# Patient Record
Sex: MALE | Race: WHITE | Employment: OTHER | URBAN - METROPOLITAN AREA
[De-identification: names, ages, dates, MRNs, and addresses within clinical notes are randomized per-mention and may not be internally consistent; named-entity substitution may affect disease eponyms.]

---

## 2018-01-02 ENCOUNTER — OFFICE VISIT (OUTPATIENT)
Dept: FAMILY MEDICINE CLINIC | Age: 78
End: 2018-01-02

## 2018-01-02 ENCOUNTER — HOSPITAL ENCOUNTER (OUTPATIENT)
Dept: LAB | Age: 78
Discharge: HOME | End: 2018-01-02
Payer: MEDICARE

## 2018-01-02 VITALS
RESPIRATION RATE: 18 BRPM | TEMPERATURE: 97.5 F | BODY MASS INDEX: 33.22 KG/M2 | WEIGHT: 194.6 LBS | SYSTOLIC BLOOD PRESSURE: 130 MMHG | HEIGHT: 64 IN | OXYGEN SATURATION: 97 % | HEART RATE: 58 BPM | DIASTOLIC BLOOD PRESSURE: 70 MMHG

## 2018-01-02 DIAGNOSIS — R35.0 FREQUENCY OF MICTURITION: ICD-10-CM

## 2018-01-02 DIAGNOSIS — M79.671 CHRONIC FOOT PAIN, RIGHT: ICD-10-CM

## 2018-01-02 DIAGNOSIS — E78.2 MIXED HYPERLIPIDEMIA: ICD-10-CM

## 2018-01-02 DIAGNOSIS — G89.29 CHRONIC FOOT PAIN, RIGHT: ICD-10-CM

## 2018-01-02 DIAGNOSIS — R39.198 URINARY STREAM SLOWING: ICD-10-CM

## 2018-01-02 DIAGNOSIS — I10 ESSENTIAL HYPERTENSION: ICD-10-CM

## 2018-01-02 DIAGNOSIS — E11.9 CONTROLLED TYPE 2 DIABETES MELLITUS WITHOUT COMPLICATION, WITHOUT LONG-TERM CURRENT USE OF INSULIN (HCC): ICD-10-CM

## 2018-01-02 DIAGNOSIS — M79.89 RIGHT LEG SWELLING: Primary | ICD-10-CM

## 2018-01-02 PROCEDURE — 82043 UR ALBUMIN QUANTITATIVE: CPT

## 2018-01-02 PROCEDURE — 80061 LIPID PANEL: CPT

## 2018-01-02 PROCEDURE — 85025 COMPLETE CBC W/AUTO DIFF WBC: CPT

## 2018-01-02 PROCEDURE — 84443 ASSAY THYROID STIM HORMONE: CPT

## 2018-01-02 PROCEDURE — 84153 ASSAY OF PSA TOTAL: CPT

## 2018-01-02 PROCEDURE — 80053 COMPREHEN METABOLIC PANEL: CPT

## 2018-01-02 PROCEDURE — 83036 HEMOGLOBIN GLYCOSYLATED A1C: CPT

## 2018-01-02 PROCEDURE — 36415 COLL VENOUS BLD VENIPUNCTURE: CPT

## 2018-01-02 RX ORDER — METOPROLOL SUCCINATE 25 MG/1
TABLET, EXTENDED RELEASE ORAL DAILY
COMMUNITY
End: 2018-04-10 | Stop reason: SDUPTHER

## 2018-01-02 NOTE — PROGRESS NOTES
Chief Complaint   Patient presents with    New Patient     past MD Dr. Josee Manley, doesn't take insurance any longer    Leg Pain     right leg pain x 1 month , went tto ER snd was xrayed and given Prednisone       Reviewed Record in preparation for visit and have obtained necessary documentation. Identified pt with two pt identifiers (Name @ )    There are no preventive care reminders to display for this patient. 1. Have you been to the ER, urgent care clinic since your last visit? Hospitalized since your last visit? Went to [facility] for right leg pain. 2. Have you seen or consulted any other health care providers outside of the [address] [place] [address] since your last visit? Include any pap smears or colon screening.  No

## 2018-01-02 NOTE — PATIENT INSTRUCTIONS
Please sign a release for the [facility] and for your previous primary doctor on your way out         ??? ????? ????? ? ????????????? ?? ??????? ??? ???????? ???????. - [ Learning About Diabetes Food Guidelines ]  ?????????? ?? ??????? ?????  ???????????? ??????? ???????? ?????? ???????? ? ??????? ????????? ???????. ??? ???????? ???????????? ??????? ??????? ??????? ? ????? (???????, ??????? ?? ?????????? ????????? ? ??????). ????????????? ???? ?????? ????. ?? ?????? ???? ??, ??? ??? ???? ?????, ?????? ???? ???????. ?? ??????? ???????? ???????? ?? ??, ??? ????? ?? ????? ? ??????? ???????????? ???? ?? ????????.  ??? ?????? ? ???????????? ???????? ??????? ???? ? ??????? ?? ?????? ?????????? ? ????????? ??? ?????????????????? ??????????? ?? ????????? ???????. ???????? ??? ?????????? ?? ????????? ??????? ????? ????? ?????? ??? ????????, ???? ??? ?????? ? ???? ????.  ??? ?? ?????? ?????? ? ??????????? ??????????  ???????? ?????????? ?????????, ??????? ?? ???????????, ???????? ?????? ?????? ????????? ??????? ??? ???????? ???????. ???????? ?????????? ?? ?????? ?????????.  · ???????, ????? ???????? ???????? ????????. ????? ????, ??????? ?????????? ????????? ? ?????? ?????????.  ¨ ????, ????????, ???????? ? ??? ???????? ????? 15 ??????? ????????? ? ??????. ??????? ????????? 1 ????? ????? (30 ???????), 1/2 ????? ?????????????? ???????? ??? ??? 1/3 ????? ?????????????? ??????? ??? ????.  ¨ ?????? ???????? 15 ??????? ????????? ? ??????. ??????? ????????? 1 ????????? ?????? ?????, ???????? ?????? ??? ????????; ½ ??????; ½ ????? ?????????????? ??? ???????????????? ???????; ½ ????? ?????????? ????; 1 ????? ???? ??? ??????; 2 ???????? ????? ???????????.  ¨ ?????? ? ?????? ??? ?????????? ?????? ???????? 15 ??????? ????????? ? ??????. ??????? ????????? 1 ????? ?????? ??? 2/3 ????? ??????? ??? ?????????? ??????.  ¨ ???????????? ????? ???????? 15 ??????? ????????? ? ??????. ??????? ????????? ½ ????? ????????????? ???? ??? ???????? ?????????; 1 ????? ????????????? ?????; ½ ?????? ??????? ???????????; ½ ????? ?????????????? ?????; ½ ????? ?????????????? ???????? ??? ???????? ???????.  · ???????, ??????? ????????? ????? ???? ?????? ???? ? ? ?????? ??????? ????. ???????? ??? ?????????? ?? ????????? ??????? ????? ??????? ??? ??????? ???????????? ?????????? ???????????? ?????????. ??? ?????????? ????????? ?????????.  · ???? ?? ?? ??????? ? ???????????? ????? ????????? ??????? ?????????, ??????????? ???????? ????? ???????????? ???????. ??? ???????, ??????? ?????? ?????????? ???????????????? ????? ????. ?? ????? ???????? ???????????? ???????? ? ??????? ???.  ¨ ????????? ??? ?? ??????? ?? ????? ????. ???????? ?????????????? ????? ?? ???????? ???????, ???? ??? ?????? ???????? ???? -- ?? ???????? ???????, ? ???????? ??? ???????????? ????? -- ?? ?????????? ???????? ???????. ? ????? ?????????? ?? ?????? ???????? ????????? ??????? ?????? ? 1 ????? ?????? ??? ??????? ? ??????????? ?? ????, ??????? ????????? ?? ????????????? ?????????? ?? ??????? ????.  · ?????????? ???? ?????????? ?????????? ????????? ??? ?????? ?????? ????. ?? «????????????» ???? ??????? ????? ?????????, ????? ?????? ?? ?? ???? ?????.  · ????? ???????? ????? ???? ??? ?????? ?? ???????? ????????? ? ??????. ????????? ?????? ????????: ????????, ??????, ???????, ????, ????, ????, ???, ?????? ? ?????????? ?????. ?????? ?????? ???? ?????????? 90 ???????, ??? ?? ???????? ????????????? ?????? ????. ?????? ??????????? ???? (?????? 30 ??????? ????) ????? ????????? ???????: 1/4 ????? ???????, 1 ????, 1 ???????? ????? ??????????? ????? ? 1/2 ????? ????.  ??? ????? ???????? ??? ???? ? ??? ?? ????? ???????? ???????? ??????  · ????????? ?????????? ??????? ??????, ??????? ???????? ????????. ???? ?? ?????? ????????? ???????? ??????? ?????? ????, ??? ????? ????? ??????? ?????? ?????? ??????????? ???.  · ???? ?????????, ??? ?????, ??????? ?? ????????, ???????? ??????? ????? ????????? (????????, ?????????, ???????? ??? ???????????????? ??????), ????????? ???????? ??? ?????? ? ?????? ??????????? ?????????. ???????? ????? ??? ??????? ?????.  · ???? ?? ??????? ???????, ?????????? ???? ??????? ??????? ? ????? ?? ? ????? ??? ??? ????, ????? ?????? ???????????, ??????? ????? ?????? ? ???????.  · ???? ?? ????? ???? ?? ????? ?????? ?????????, ??? ???????????, ???????????? ??? ???????? ??????????. ??? ??????? ??????? ??????? ??????? ? ?????.  ??? ??? ?? ?????? ??????  · ?????????? ??????????? ?????????? ?????, ???????? ????? ?? ???? ? ???????? ?????????. ??? ???????? ?????, ??? ??? ? ????? ? ???????? ???????? ??????? ???? ???????? ????????? ???????????. ??????? ????????? ??????? ????? ???? ? ???????? ???????? ? ?????? ??????????? ???? ??? ????????????. ??????????? ????????? ??? ????????? ????? ?????? ?????????? ????? ??? ?????????? ?????????? ?????, ????????????? ?? ????? ???????.  · ?? ??????????? ?????? ????. ???? ?? ?????????? ????? ???? ? ??????? ??????? ??? ???????????? ????????????? ????????? ??? ??????? ????????? ???????, ??? ??????? ??????? ????? ?????? ??????? ?????.  · ??????????????????? ? ??????, ?????? ??? ??????????? ????????. ???????? ????? ??????? ??????? ?????? ??????? ? ?????. ????? ????, ???? ?? ?????????? ???????????? ????????????? ????????? ??? ??????? ????????? ???????, ???????? ????? ???????? ??????????????? ???????? ???????.  ??????????? ?????????? ? ???? -- ?????? ????? ?????? ??????? ? ????? ?????? ????????.??????????? ????????? ?????, ???? ??? ??? ???????? ?????. ??? ????????? ??????? ????????? ?????? ?????. ????????????? ????? ?????? ????????, ??????? ?? ??????????, ? ????? ?? ????? ?????????? ????? ????????.  ??? ????? ???????? ?????????????? ???????????  ????????? [email or web address] ????? I147 ? ?????? ?????? ?????????????? ?????????? \"??? ????? ????? ? ????????????? ?? ??????? ??? ???????? ???????. - [ Learning About Diabetes Food Guidelines ]. \"  ??????? ?????? ??: ????? 13, 2017  ?????? ????????: 11.4  © [id number] Healthwise, Incorporated. ??????????? ??????????, ?????????????? ? ???????????? ? ???????? ???????? ????? ??????. ???? ? ??? ????????? ??????? ? ?????-???? ??????????? ??? ?? ??????????, ?????????? ? ?????? ??????????, ??????? ?? ?????? ?????. ????????  Healthwise, Incorporated ?? ???? ???????? ? ?? ????? ??????? ??????????????? ?? ??????????? ????????????? ?????? ??????????.

## 2018-01-02 NOTE — MR AVS SNAPSHOT
Visit Information Date & Time Provider Department Dept. Phone Encounter #  
 1/2/2018 10:00 AM Joey Ceja, [facility] [phone] [id number] Upcoming Health Maintenance Date Due DTaP/Tdap/Td series (1 - Tdap) 2/14/1962 ZOSTER VACCINE AGE 60> 12/14/2000 GLAUCOMA SCREENING Q2Y 2/14/2006 Pneumococcal 65+ High/Highest Risk (1 of 2 - PCV13) 2/14/2006 MEDICARE YEARLY EXAM 2/14/2006 Influenza Age 5 to Adult 8/1/2017 Allergies as of 1/2/2018  Review Complete On: 1/2/2018 By: Joey Ceja MD  
 No Known Allergies Current Immunizations  Reviewed on 12/26/2017 Name Date Td 9/30/1999, 3/29/1999, 1/28/1999 Tdap 8/6/2015 Not reviewed this visit You Were Diagnosed With   
  
 Codes Comments Right leg swelling    -  Primary ICD-10-CM: M79.89 ICD-9-CM: 729.81 Chronic foot pain, right     ICD-10-CM: M79.671, G89.29 ICD-9-CM: 729.5, 338.29 Mixed hyperlipidemia     ICD-10-CM: E78.2 ICD-9-CM: 272.2 Controlled type 2 diabetes mellitus without complication, without long-term current use of insulin ([facility]     ICD-10-CM: E11.9 ICD-9-CM: 250.00 Essential hypertension     ICD-10-CM: I10 
ICD-9-CM: 401.9 Urinary stream slowing     ICD-10-CM: R39.198 ICD-9-CM: 071.50 Frequency of micturition     ICD-10-CM: R35.0 ICD-9-CM: 788.41 Vitals BP Pulse Temp Resp Height(growth percentile) Weight(growth percentile) 130/70 (!) 58 97.5 °F (36.4 °C) (Oral) 18 5' 4\" (1.626 m) 194 lb 9.6 oz (88.3 kg) SpO2 BMI Smoking Status 97% 33.4 kg/m2 Former Smoker Vitals History BMI and BSA Data Body Mass Index Body Surface Area  
 33.4 kg/m 2 2 m 2 Preferred Pharmacy Pharmacy Name Phone [facility] [address] [place] [address], [address] [place] [address] [phone] Your Updated Medication List  
  
   
 This list is accurate as of: 1/2/18 10:54 AM.  Always use your most recent med list.  
  
  
  
  
 aspirin, buffered 81 mg Tab Take  by mouth daily. Taking twice weekly  
  
 atenolol 25 mg tablet Commonly known as:  TENORMIN Take 25 mg by mouth daily. JANUVIA 100 mg tablet Generic drug:  SITagliptin Take 100 mg by mouth daily. LOVAZA 1 gram capsule Generic drug:  omega-3 acid ethyl esters Take 2 g by mouth daily (with breakfast). metFORMIN 500 mg tablet Commonly known as:  GLUCOPHAGE Take  by mouth two (2) times daily (with meals). Takes 2 tabs in am , and 1 tab q hs  
  
 metoprolol succinate 25 mg XL tablet Commonly known as:  TOPROL-XL Take  by mouth daily. pravastatin 40 mg tablet Commonly known as:  PRAVACHOL Take 40 mg by mouth daily. valsartan-hydroCHLOROthiazide 320-25 mg per tablet Commonly known as:  DIOVAN-HCT Take 1 Tab by mouth daily. We Performed the Following CBC WITH AUTOMATED DIFF [72100 CPT(R)] LIPID PANEL [02930 CPT(R)] METABOLIC PANEL, COMPREHENSIVE [97289 CPT(R)] MICROALBUMIN, UR, RAND W/ MICROALBUMIN/CREA RATIO [id number] CPT(R)] PSA W/ REFLX FREE PSA [63872 CPT(R)] REFERRAL TO PODIATRY [REF90 Custom] TSH 3RD GENERATION [33074 CPT(R)] To-Do List   
 01/02/2018 Imaging:  DUPLEX LOWER EXT VENOUS RIGHT Referral Information Referral ID Referred By Referred To  
  
 [id number] [facility], [address] [facility]   
   [address] [facility], [address] Visits Status Start Date End Date 1 New Request 1/2/18 1/2/19 If your referral has a status of pending review or denied, additional information will be sent to support the outcome of this decision. Patient Instructions Please sign a release for the [facility] and for your previous primary doctor on your way out ??? ????? ????? ? ????????????? ?? ??????? ??? ???????? ???????. - [ Learning About Diabetes Food Guidelines ] ?????????? ?? ??????? ????? 
???????????? ??????? ???????? ?????? ???????? ? ??????? ????????? ???????. ??? ???????? ???????????? ??????? ??????? ??????? ? ????? (???????, ??????? ?? ?????????? ????????? ? ??????). ????????????? ???? ?????? ????. ?? ?????? ???? ??, ??? ??? ???? ?????, ?????? ???? ???????. ?? ??????? ???????? ???????? ?? ??, ??? ????? ?? ????? ? ??????? ???????????? ???? ?? ????????. 
??? ?????? ? ???????????? ???????? ??????? ???? ? ??????? ?? ?????? ?????????? ? ????????? ??? ?????????????????? ??????????? ?? ????????? ???????. ???????? ??? ?????????? ?? ????????? ??????? ????? ????? ?????? ??? ????????, ???? ??? ?????? ? ???? ????. 
??? ?? ?????? ?????? ? ??????????? ?????????? 
???????? ?????????? ?????????, ??????? ?? ???????????, ???????? ?????? ?????? ????????? ??????? ??? ???????? ???????. ???????? ?????????? ?? ?????? ?????????. 
· ???????, ????? ???????? ???????? ????????. ????? ????, ??????? ?????????? ????????? ? ?????? ?????????. 
¨ ????, ????????, ???????? ? ??? ???????? ????? 15 ??????? ????????? ? ??????. ??????? ????????? 1 ????? ????? (30 ???????), 1/2 ????? ?????????????? ???????? ??? ??? 1/3 ????? ?????????????? ??????? ??? ????. 
¨ ?????? ???????? 15 ??????? ????????? ? ??????. ??????? ????????? 1 ????????? ?????? ?????, ???????? ?????? ??? ????????; ½ ??????; ½ ????? ?????????????? ??? ???????????????? ???????; ½ ????? ?????????? ????; 1 ????? ???? ??? ??????; 2 ???????? ????? ???????????. 
¨ ?????? ? ?????? ??? ?????????? ?????? ???????? 15 ??????? ????????? ? ??????. ??????? ????????? 1 ????? ?????? ??? 2/3 ????? ??????? ??? ?????????? ??????. 
¨ ???????????? ????? ???????? 15 ??????? ????????? ? ??????. ??????? ????????? ½ ????? ????????????? ???? ??? ???????? ?????????; 1 ????? ????????????? ?????; ½ ?????? ??????? ???????????; ½ ????? ?????????????? ?????; ½ ????? ?????????????? ???????? ??? ???????? ???????. 
· ???????, ??????? ????????? ????? ???? ?????? ???? ? ? ?????? ??????? ????. ???????? ??? ?????????? ?? ????????? ??????? ????? ??????? ??? ??????? ???????????? ?????????? ???????????? ?????????. ??? ?????????? ????????? ?????????. 
· ???? ?? ?? ??????? ? ???????????? ????? ????????? ??????? ?????????, ??????????? ???????? ????? ???????????? ???????. ??? ???????, ??????? ?????? ?????????? ???????????????? ????? ????. ?? ????? ???????? ???????????? ???????? ? ??????? ???. 
¨ ????????? ??? ?? ??????? ?? ????? ????. ???????? ?????????????? ????? ?? ???????? ???????, ???? ??? ?????? ???????? ????  ?? ???????? ???????, ? ???????? ??? ???????????? ?????  ?? ?????????? ???????? ???????. ? ????? ?????????? ?? ?????? ???????? ????????? ??????? ?????? ? 1 ????? ?????? ??? ??????? ? ??????????? ?? ????, ??????? ????????? ?? ????????????? ?????????? ?? ??????? ????. 
· ?????????? ???? ?????????? ?????????? ????????? ??? ?????? ?????? ????. ?? «????????????» ???? ??????? ????? ?????????, ????? ?????? ?? ?? ???? ?????. 
· ????? ???????? ????? ???? ??? ?????? ?? ???????? ????????? ? ??????. ????????? ?????? ????????: ????????, ??????, ???????, ????, ????, ????, ???, ?????? ? ?????????? ?????. ?????? ?????? ???? ?????????? 90 ???????, ??? ?? ???????? ????????????? ?????? ????. ?????? ??????????? ???? (?????? 30 ??????? ????) ????? ????????? ???????: 1/4 ????? ???????, 1 ????, 1 ???????? ????? ??????????? ????? ? 1/2 ????? ????. 
??? ????? ???????? ??? ???? ? ??? ?? ????? ???????? ???????? ?????? 
· ????????? ?????????? ??????? ??????, ??????? ???????? ????????. ???? ?? ?????? ????????? ???????? ??????? ?????? ????, ??? ????? ????? ??????? ?????? ?????? ??????????? ???. 
· ???? ?????????, ??? ?????, ??????? ?? ????????, ???????? ??????? ????? ????????? (????????, ?????????, ???????? ??? ???????????????? ??????), ????????? ???????? ??? ?????? ? ?????? ??????????? ?????????. ???????? ????? ??? ??????? ?????. 
· ???? ?? ??????? ???????, ?????????? ???? ??????? ??????? ? ????? ?? ? ????? ??? ??? ????, ????? ?????? ???????????, ??????? ????? ?????? ? ???????. 
· ???? ?? ????? ???? ?? ????? ?????? ?????????, ??? ???????????, ???????????? ??? ???????? ??????????. ??? ??????? ??????? ??????? ??????? ? ?????. 
??? ??? ?? ?????? ?????? 
· ?????????? ??????????? ?????????? ?????, ???????? ????? ?? ???? ? ???????? ?????????. ??? ???????? ?????, ??? ??? ? ????? ? ???????? ???????? ??????? ???? ???????? ????????? ???????????. ??????? ????????? ??????? ????? ???? ? ???????? ???????? ? ?????? ??????????? ???? ??? ????????????. ??????????? ????????? ??? ????????? ????? ?????? ?????????? ????? ??? ?????????? ?????????? ?????, ????????????? ?? ????? ???????. 
· ?? ??????????? ?????? ????. ???? ?? ?????????? ????? ???? ? ??????? ??????? ??? ???????????? ????????????? ????????? ??? ??????? ????????? ???????, ??? ??????? ??????? ????? ?????? ??????? ?????. 
· ??????????????????? ? ??????, ?????? ??? ??????????? ????????. ???????? ????? ??????? ??????? ?????? ??????? ? ?????. ????? ????, ???? ?? ?????????? ???????????? ????????????? ????????? ??? ??????? ????????? ???????, ???????? ????? ???????? ??????????????? ???????? ???????. 
??????????? ?????????? ? ????  ?????? ????? ?????? ??????? ? ????? ?????? ????????.??????????? ????????? ?????, ???? ??? ??? ???????? ?????. ??? ????????? ??????? ????????? ?????? ?????. ????????????? ????? ?????? ????????, ??????? ?? ??????????, ? ????? ?? ????? ?????????? ????? ????????. 
??? ????? ???????? ?????????????? ??????????? 
????????? [email or web address] ????? I147 ? ?????? ?????? ?????????????? ?????????? \"??? ????? ????? ? ????????????? ?? ??????? ??? ???????? ???????. - [ Learning About Diabetes Food Guidelines ]. \" 
??????? ?????? ??: ????? 13, 2017 
?????? ????????: 11.4 © 6537-2658 Healthwise, Incorporated. ??????????? ??????????, ?????????????? ? ???????????? ? ???????? ???????? ????? ??????. ???? ? ??? ????????? ??????? ? ?????-???? ??????????? ??? ?? ??????????, ?????????? ? ?????? ??????????, ??????? ?? ?????? ?????. ???????? Healthwise, Incorporated ?? ???? ???????? ? ?? ????? ??????? ??????????????? ?? ??????????? ????????????? ?????? ??????????. Introducing [facility]! Johnnie Carlos Eduardo introduces Vestiage patient portal. Now you can access parts of your medical record, email your doctor's office, and request medication refills online. 1. In your internet browser, go to https://[employer]. [employer] 2. Click on the First Time User? Click Here link in the Sign In box. You will see the New Member Sign Up page. 3. Enter your Vestiage Access Code exactly as it appears below. You will not need to use this code after youve completed the sign-up process. If you do not sign up before the expiration date, you must request a new code. · Vestiage Access Code: [email or web address] Expires: 4/2/2018 10:54 AM 
 
4. Enter the last four digits of your Social Security Number (xxxx) and Date of Birth (mm/dd/yyyy) as indicated and click Submit. You will be taken to the next sign-up page. 5. Create a Vestiage ID. This will be your Vestiage login ID and cannot be changed, so think of one that is secure and easy to remember. 6. Create a Vestiage password. You can change your password at any time. 7. Enter your Password Reset Question and Answer. This can be used at a later time if you forget your password. 8. Enter your e-mail address. You will receive e-mail notification when new information is available in [address]. 9. Click Sign Up. You can now view and download portions of your medical record. 10. Click the Download Summary menu link to download a portable copy of your medical information.  
 
If you have questions, please visit the Frequently Asked Questions section of the [employer]. Remember, LogFiret is NOT to be used for urgent needs. For medical emergencies, dial 911. Now available from your iPhone and Android! Please provide this summary of care documentation to your next provider. Your primary care clinician is listed as Helga Mir. If you have any questions after today's visit, please call [phone].

## 2018-01-02 NOTE — PROGRESS NOTES
Abdelrahman Briggs [address] [place], [address]  [phone]             Date of visit: 1/2/2018   Subjective:      History obtained from:  Patient, wife. Mini Bradford is a 69 y.o. male who presents today for new patient  Pain started in his right first few toes  Was painful, not red or swollen,   now is swollen up the leg and hurts up the leg. Swelling the same  Using tylenol helps a little. He has had operation in foot for broken bone years ago    Was at [facility] ER recently and had xray foot  Has had swelling in the past on and off since foot surgery for fracture years ago (Dr. Floyd Szymanski) but never this much or persistent swelling    Last a1c was in Aug was 7.0  Remembers his meds  Is careful about what he eats  Has had diabetes for 9 years  Sees Dr. Shakira Danielson, has had bilateral cataract and strabismus surgery  Retinal specialist has appt 4/2 Dr. Stephanie Hinton  Has appt     Checks bp at home usually 140 range    Fasting for labs  Hx hypertrigl and high cholesterol    [place] 3[place], also walks dog every day  They try to eat well    Patient Active Problem List    Diagnosis Date Noted    Chronic foot pain, right 01/02/2018    Essential hypertension 01/02/2018    Controlled type 2 diabetes mellitus without complication, without long-term current use of insulin ([facility] [address] 01/02/2018    Mixed hyperlipidemia 01/02/2018     Current Outpatient Prescriptions   Medication Sig Dispense Refill    SITagliptin (JANUVIA) 100 mg tablet Take 100 mg by mouth daily.  metoprolol succinate (TOPROL-XL) 25 mg XL tablet Take  by mouth daily.  omega-3 acid ethyl esters (LOVAZA) 1 gram capsule Take 2 g by mouth daily (with breakfast).  pravastatin (PRAVACHOL) 40 mg tablet Take 40 mg by mouth daily.  metFORMIN (GLUCOPHAGE) 500 mg tablet Take  by mouth two (2) times daily (with meals). Takes 2 tabs in am , and 1 tab q hs      Aspirin, Buffered 81 mg tab Take  by mouth daily.  Taking twice weekly      valsartan-hydrochlorothiazide (DIOVAN-HCT) 320-25 mg per tablet Take 1 Tab by mouth daily.  atenolol (TENORMIN) 25 mg tablet Take 25 mg by mouth daily. No Known Allergies  Past Medical History:   Diagnosis Date    Diabetes ([address]     type 2; niddm    Hypertension     Ill-defined condition     HIGH CHOLESTEROL     Past Surgical History:   Procedure Laterality Date    HX APPENDECTOMY      HX CATARACT REMOVAL Right     HX GI  2013    COLONOSCOPY    HX HEENT Bilateral     STRABISMUS CORRECTION     Family History   Problem Relation Age of Onset    Diabetes Mother     Hypertension Mother    Murray Gardiner Other Father       age 55, [de-identified] stroke    Diabetes Sister     Hypertension Sister     Anesth Problems Neg Hx      Social History   Substance Use Topics    Smoking status: Former Smoker     Packs/day: 0.75     Years: 25.00     Quit date: 2004    Smokeless tobacco: Never Used    Alcohol use No      Social History     Social History Narrative        2 kids and 4 grandkids live closeby    Originally from [facility]        Review of Systems  Card: denies chest pain  Pulm: denies shortness of breath  : admits to some difficulty urinating (2x at night, small stream)  GI: denies abd pain or bleeding  Neuro denies heaches  Ent: denies congestion  All: denies allergies  Endo denies weight changes  Gen: denies fever  Psych: denies depression or anxiety  Skin: denies lesions         Objective:     Vitals:    18 1012 18 1054   BP: 156/87 130/70   Pulse: (!) 58    Resp: 18    Temp: 97.5 °F (36.4 °C)    TempSrc: Oral    SpO2: 97%    Weight: 194 lb 9.6 oz (88.3 kg)    Height: 5' 4\" (1.626 m)      Body mass index is 33.4 kg/(m^2).      General: stated age, obese and in NAD  Neck: supple, symmetrical, trachea midline, no adenopathy and thyroid: not enlarged, symmetric, no tenderness/mass/nodules  Lungs:  clear to auscultation w/o rales, rhonchi, wheezes w/normal effort and no use of accessory muscles of respiration   Heart: regular rate and rhythm, S1, S2 normal, no murmur, click, rub or gallop  Abdomen: soft, nontender, no masses  Ext:  Trace edema on left ankle, right leg with 2+ edema up to knee, slight redness, no warmth. Tenderness of great toe but no swelling, has some overlap of toes. Neg homans. No calf tenderness  Lymph: no cervical adenopathy appreciated  Skin:  Normal. and no rash or abnormalities   Psych: alert and oriented to person, place, time and situation and Speech: appropriate quality, quantity and organization of sentences, speaks in [facility] and [place]     Assessment/Plan:       ICD-10-CM ICD-9-CM    1. Right leg swelling M79.89 729.81 DUPLEX LOWER EXT VENOUS RIGHT   2. Chronic foot pain, right M79.671 729.5 REFERRAL TO PODIATRY    G89.29 338.29    3. Mixed hyperlipidemia E78.2 272.2    4. Controlled type 2 diabetes mellitus without complication, without long-term current use of insulin (HCC) E11.9 250.00 LIPID PANEL      METABOLIC PANEL, COMPREHENSIVE      CBC WITH AUTOMATED DIFF      TSH 3RD GENERATION      MICROALBUMIN, UR, RAND W/ MICROALBUMIN/CREA RATIO   5. Essential hypertension I10 401.9    6. Urinary stream slowing R39.198 788.62 PSA W/ REFLX FREE PSA   7. Frequency of micturition  R35.0 788.41 PSA W/ REFLX FREE PSA        Orders Placed This Encounter    DUPLEX LOWER EXT VENOUS RIGHT    LIPID PANEL    METABOLIC PANEL, COMPREHENSIVE    CBC WITH AUTOMATED DIFF    TSH 3RD GENERATION    MICROALBUMIN, UR, RAND W/ MICROALBUMIN/CREA RATIO    PSA W/ REFLX FREE PSA    REFERRAL TO PODIATRY    SITagliptin (JANUVIA) 100 mg tablet    metoprolol succinate (TOPROL-XL) 25 mg XL tablet       Swelling dramatic enough I think we need to rule out DVT  Perhaps this is all related to previous foot injury, however  Refer back to podiatrist for help with his chronic foot pain    DM/HTN/Lipids well controlled  Do labs, no med changes for now.   Need old records    Mild prostate symptoms chronic check PSA    Discussed the diagnosis and plan and he expressed understanding. Follow-up Disposition:  Return in about 3 months (around 4/2/2018) for Follow up. hopefully will have records by then to see what else he needs and when AWV is due.     Alyssa Cervantes MD

## 2018-01-03 ENCOUNTER — HOSPITAL ENCOUNTER (OUTPATIENT)
Dept: ULTRASOUND IMAGING | Age: 78
Discharge: HOME | End: 2018-01-03
Payer: MEDICARE

## 2018-01-03 DIAGNOSIS — M79.89 RIGHT LEG SWELLING: ICD-10-CM

## 2018-01-03 LAB
ALBUMIN SERPL-MCNC: 4.1 G/DL (ref 3.5–4.8)
ALBUMIN/CREAT UR: 31.8 MG/G CREAT (ref 0–30)
ALBUMIN/GLOB SERPL: 1.2 {RATIO} (ref 1.2–2.2)
ALP SERPL-CCNC: 60 IU/L (ref 39–117)
ALT SERPL-CCNC: 13 IU/L (ref 0–44)
AST SERPL-CCNC: 15 IU/L (ref 0–40)
BASOPHILS # BLD AUTO: 0 X10E3/UL (ref 0–0.2)
BASOPHILS NFR BLD AUTO: 0 %
BILIRUB SERPL-MCNC: 0.3 MG/DL (ref 0–1.2)
BUN SERPL-MCNC: 33 MG/DL (ref 8–27)
BUN/CREAT SERPL: 24 (ref 10–24)
CALCIUM SERPL-MCNC: 10.1 MG/DL (ref 8.6–10.2)
CHLORIDE SERPL-SCNC: 98 MMOL/L (ref 96–106)
CHOLEST SERPL-MCNC: 200 MG/DL (ref 100–199)
CO2 SERPL-SCNC: 26 MMOL/L (ref 18–29)
CREAT SERPL-MCNC: 1.39 MG/DL (ref 0.76–1.27)
CREAT UR-MCNC: 98.6 MG/DL
EOSINOPHIL # BLD AUTO: 0.1 X10E3/UL (ref 0–0.4)
EOSINOPHIL NFR BLD AUTO: 1 %
ERYTHROCYTE [DISTWIDTH] IN BLOOD BY AUTOMATED COUNT: 14.9 % (ref 12.3–15.4)
GLOBULIN SER CALC-MCNC: 3.3 G/DL (ref 1.5–4.5)
GLUCOSE SERPL-MCNC: 123 MG/DL (ref 65–99)
HCT VFR BLD AUTO: 39.7 % (ref 37.5–51)
HDLC SERPL-MCNC: 59 MG/DL
HGB BLD-MCNC: 13.5 G/DL (ref 13–17.7)
IMM GRANULOCYTES # BLD: 0 X10E3/UL (ref 0–0.1)
IMM GRANULOCYTES NFR BLD: 0 %
INTERPRETATION, 910389: NORMAL
INTERPRETATION: NORMAL
LDLC SERPL CALC-MCNC: 112 MG/DL (ref 0–99)
LYMPHOCYTES # BLD AUTO: 1.5 X10E3/UL (ref 0.7–3.1)
LYMPHOCYTES NFR BLD AUTO: 19 %
MCH RBC QN AUTO: 27.3 PG (ref 26.6–33)
MCHC RBC AUTO-ENTMCNC: 34 G/DL (ref 31.5–35.7)
MCV RBC AUTO: 80 FL (ref 79–97)
MICROALBUMIN UR-MCNC: 31.4 UG/ML
MONOCYTES # BLD AUTO: 0.7 X10E3/UL (ref 0.1–0.9)
MONOCYTES NFR BLD AUTO: 8 %
NEUTROPHILS # BLD AUTO: 6 X10E3/UL (ref 1.4–7)
NEUTROPHILS NFR BLD AUTO: 72 %
PDF IMAGE, 910387: NORMAL
PLATELET # BLD AUTO: 233 X10E3/UL (ref 150–379)
POTASSIUM SERPL-SCNC: 4.3 MMOL/L (ref 3.5–5.2)
PROT SERPL-MCNC: 7.4 G/DL (ref 6–8.5)
PSA SERPL-MCNC: 3.7 NG/ML (ref 0–4)
RBC # BLD AUTO: 4.95 X10E6/UL (ref 4.14–5.8)
REFLEX CRITERIA: NORMAL
SODIUM SERPL-SCNC: 140 MMOL/L (ref 134–144)
TRIGL SERPL-MCNC: 146 MG/DL (ref 0–149)
TSH SERPL DL<=0.005 MIU/L-ACNC: 1.39 UIU/ML (ref 0.45–4.5)
VLDLC SERPL CALC-MCNC: 29 MG/DL (ref 5–40)
WBC # BLD AUTO: 8.3 X10E3/UL (ref 3.4–10.8)

## 2018-01-03 PROCEDURE — 93971 EXTREMITY STUDY: CPT

## 2018-01-03 NOTE — PROGRESS NOTES
Please let him know there was no blood clot, which is great news. I will send a letter with labs when they come back. Thanks!

## 2018-01-04 ENCOUNTER — TELEPHONE (OUTPATIENT)
Dept: FAMILY MEDICINE CLINIC | Age: 78
End: 2018-01-04

## 2018-01-04 PROBLEM — R80.9 MICROALBUMINURIA: Status: ACTIVE | Noted: 2018-01-04

## 2018-01-04 PROBLEM — E11.21 TYPE 2 DIABETES MELLITUS WITH NEPHROPATHY (HCC): Status: ACTIVE | Noted: 2018-01-04

## 2018-01-04 NOTE — TELEPHONE ENCOUNTER
----- Message from [employer] sent at 1/4/2018 10:29 AM EST -----  Regarding: Dr. Mitul Edgar  Pt is returning call received this morning. Best contact number is [phone].

## 2018-01-05 ENCOUNTER — TELEPHONE (OUTPATIENT)
Dept: FAMILY MEDICINE CLINIC | Age: 78
End: 2018-01-05

## 2018-01-05 LAB — SPECIMEN STATUS REPORT, ROLRST: NORMAL

## 2018-01-05 NOTE — TELEPHONE ENCOUNTER
Patients son has called yesterday because he wanted to speak to nurse regarding his father and has not received any calls yet and he is very upset. He would like a call back from nurse.      Best contact number for son Gus: [phone]  1/5/18  Rangel Collado

## 2018-01-05 NOTE — TELEPHONE ENCOUNTER
Spoke with patients son and advised doppler study was negative for DVT  Regarding the bilateral foot pain, advised that Dr Ceci Fraser referred patient to [facility] Dr Betty Esqueda  Just need to call and schedule an appointment with him.

## 2018-01-06 PROBLEM — R79.89 ELEVATED SERUM CREATININE: Status: ACTIVE | Noted: 2018-01-06

## 2018-01-06 LAB
HBA1C MFR BLD: 6.5 % (ref 4.8–5.6)
SPECIMEN STATUS REPORT, ROLRST: NORMAL

## 2018-01-07 NOTE — PROGRESS NOTES
Sent in letter:  RECOMMENDATIONS:  Most tests were very good, including a1c sugar test (diabetes is well-controlled), liver, electrolytes, thyroid, blood counts, and PSA prostate test.  Cholesterol is not perfect and would probably benefit from a stronger medication (such as atorvastatin). We can discuss at your next appointment and after I see your records. I suspect you have been on this in the past and had side effects? Kidney test (creatinine) is slightly elevated. It is important that you don't take any ibuprofen or aleve, which could hurt your kidneys. We should recheck the test in 3 months.   Overall, though this is good news!!

## 2018-03-02 ENCOUNTER — TELEPHONE (OUTPATIENT)
Dept: FAMILY MEDICINE CLINIC | Age: 78
End: 2018-03-02

## 2018-03-02 NOTE — TELEPHONE ENCOUNTER
Patients son Aga Aguilar) called in ref to his father He had to take pt to ER on Monday, they did an EKG and checked hemoglobin 842. He is asking for a referral for a colonoscopy   And will have pre op blood work done here.   Best # [phone]  [place]  3/2/18

## 2018-03-29 RX ORDER — OMEGA-3-ACID ETHYL ESTERS 1 G/1
CAPSULE, LIQUID FILLED ORAL
Qty: 180 CAP | Refills: 0 | Status: SHIPPED | OUTPATIENT
Start: 2018-03-29 | End: 2018-06-18 | Stop reason: SDUPTHER

## 2018-04-10 ENCOUNTER — HOSPITAL ENCOUNTER (OUTPATIENT)
Dept: LAB | Age: 78
Discharge: HOME | End: 2018-04-10
Payer: MEDICARE

## 2018-04-10 ENCOUNTER — OFFICE VISIT (OUTPATIENT)
Dept: FAMILY MEDICINE CLINIC | Age: 78
End: 2018-04-10

## 2018-04-10 VITALS
WEIGHT: 194 LBS | DIASTOLIC BLOOD PRESSURE: 80 MMHG | RESPIRATION RATE: 16 BRPM | OXYGEN SATURATION: 99 % | HEIGHT: 64 IN | BODY MASS INDEX: 33.12 KG/M2 | TEMPERATURE: 96.3 F | HEART RATE: 57 BPM | SYSTOLIC BLOOD PRESSURE: 138 MMHG

## 2018-04-10 DIAGNOSIS — R79.89 ELEVATED SERUM CREATININE: ICD-10-CM

## 2018-04-10 DIAGNOSIS — M25.571 CHRONIC ANKLE PAIN, BILATERAL: ICD-10-CM

## 2018-04-10 DIAGNOSIS — M79.672 FOOT PAIN, BILATERAL: ICD-10-CM

## 2018-04-10 DIAGNOSIS — G89.29 CHRONIC ANKLE PAIN, BILATERAL: ICD-10-CM

## 2018-04-10 DIAGNOSIS — E78.2 MIXED HYPERLIPIDEMIA: ICD-10-CM

## 2018-04-10 DIAGNOSIS — Z00.00 MEDICARE ANNUAL WELLNESS VISIT, SUBSEQUENT: Primary | ICD-10-CM

## 2018-04-10 DIAGNOSIS — I10 ESSENTIAL HYPERTENSION: ICD-10-CM

## 2018-04-10 DIAGNOSIS — E11.21 TYPE 2 DIABETES MELLITUS WITH NEPHROPATHY (HCC): ICD-10-CM

## 2018-04-10 DIAGNOSIS — R39.11 URINARY HESITANCY: ICD-10-CM

## 2018-04-10 DIAGNOSIS — M25.572 CHRONIC ANKLE PAIN, BILATERAL: ICD-10-CM

## 2018-04-10 DIAGNOSIS — M79.671 FOOT PAIN, BILATERAL: ICD-10-CM

## 2018-04-10 DIAGNOSIS — E11.9 CONTROLLED TYPE 2 DIABETES MELLITUS WITHOUT COMPLICATION, WITHOUT LONG-TERM CURRENT USE OF INSULIN (HCC): ICD-10-CM

## 2018-04-10 PROCEDURE — 84550 ASSAY OF BLOOD/URIC ACID: CPT

## 2018-04-10 PROCEDURE — 86038 ANTINUCLEAR ANTIBODIES: CPT

## 2018-04-10 PROCEDURE — 86431 RHEUMATOID FACTOR QUANT: CPT

## 2018-04-10 PROCEDURE — 80048 BASIC METABOLIC PNL TOTAL CA: CPT

## 2018-04-10 PROCEDURE — 86140 C-REACTIVE PROTEIN: CPT

## 2018-04-10 PROCEDURE — 85651 RBC SED RATE NONAUTOMATED: CPT

## 2018-04-10 PROCEDURE — 85025 COMPLETE CBC W/AUTO DIFF WBC: CPT

## 2018-04-10 PROCEDURE — 83036 HEMOGLOBIN GLYCOSYLATED A1C: CPT

## 2018-04-10 RX ORDER — METOPROLOL SUCCINATE 25 MG/1
25 TABLET, EXTENDED RELEASE ORAL DAILY
Qty: 90 TAB | Refills: 1 | Status: SHIPPED | OUTPATIENT
Start: 2018-04-10 | End: 2018-10-08 | Stop reason: SDUPTHER

## 2018-04-10 RX ORDER — AMLODIPINE BESYLATE 10 MG/1
10 TABLET ORAL DAILY
Qty: 90 TAB | Refills: 1 | Status: SHIPPED | OUTPATIENT
Start: 2018-04-10 | End: 2018-10-08 | Stop reason: SDUPTHER

## 2018-04-10 RX ORDER — AMLODIPINE BESYLATE 10 MG/1
10 TABLET ORAL DAILY
Refills: 4 | COMMUNITY
Start: 2018-03-28 | End: 2018-04-10 | Stop reason: SDUPTHER

## 2018-04-10 RX ORDER — ATORVASTATIN CALCIUM 40 MG/1
40 TABLET, FILM COATED ORAL DAILY
Qty: 90 TAB | Refills: 1 | Status: SHIPPED | OUTPATIENT
Start: 2018-04-10 | End: 2018-10-08 | Stop reason: SDUPTHER

## 2018-04-10 NOTE — MR AVS SNAPSHOT
[address] [place] [address] 
[phone] Patient: [facility] DORA MRN: [id number] FXB:[birth year] Visit Information Date & Time Provider Department Dept. Phone Encounter #  
 4/10/2018  8:30 AM Frida Hennessy, [address] [phone] [id number] Follow-up Instructions Return in about 3 months (around 7/10/2018) for Follow up. Upcoming Health Maintenance Date Due  
 FOOT EXAM Q1 2/14/1950 EYE EXAM RETINAL OR DILATED Q1 2/14/1950 ZOSTER VACCINE AGE 60> 12/14/2000 GLAUCOMA SCREENING Q2Y 2/14/2006 Pneumococcal 65+ High/Highest Risk (1 of 2 - PCV13) 2/14/2006 MEDICARE YEARLY EXAM 3/14/2018 HEMOGLOBIN A1C Q6M 7/2/2018 MICROALBUMIN Q1 1/2/2019 LIPID PANEL Q1 1/2/2019 DTaP/Tdap/Td series (2 - Td) 8/6/2025 Allergies as of 4/10/2018  Review Complete On: 4/10/2018 By: Frida Hennessy MD  
 No Known Allergies Current Immunizations  Reviewed on 12/26/2017 Name Date Td 9/30/1999, 3/29/1999, 1/28/1999 Tdap 8/6/2015 Not reviewed this visit You Were Diagnosed With   
  
 Codes Comments Medicare annual wellness visit, subsequent    -  Primary ICD-10-CM: Z00.00 ICD-9-CM: V70.0 Type 2 diabetes mellitus with nephropathy (HCC)     ICD-10-CM: E11.21 
ICD-9-CM: 250.40, 583.81 Essential hypertension     ICD-10-CM: I10 
ICD-9-CM: 401.9 Mixed hyperlipidemia     ICD-10-CM: E78.2 ICD-9-CM: 272.2 Elevated serum creatinine     ICD-10-CM: R79.89 ICD-9-CM: 790.99 Foot pain, bilateral     ICD-10-CM: M79.671, K52.997 ICD-9-CM: 729.5 Chronic ankle pain, bilateral     ICD-10-CM: M25.571, G89.29, M25.572 ICD-9-CM: 719.47, 338.29 Controlled type 2 diabetes mellitus without complication, without long-term current use of insulin ([facility]     ICD-10-CM: E11.9 ICD-9-CM: 250.00 Urinary hesitancy     ICD-10-CM: R39.11 ICD-9-CM: 788.64   
  
 Vitals BP Pulse Temp Resp Height(growth percentile) Weight(growth percentile) 138/80 (!) 57 96.3 °F (35.7 °C) (Oral) 16 5' 4\" (1.626 m) 194 lb (88 kg) SpO2 BMI Smoking Status 99% 33.3 kg/m2 Former Smoker Vitals History BMI and BSA Data Body Mass Index Body Surface Area  
 33.3 kg/m 2 1.99 m 2 Preferred Pharmacy Pharmacy Name Phone [facility] [address] [place] [address], [address] [place] [address] [phone] Your Updated Medication List  
  
   
This list is accurate as of 4/10/18  9:28 AM.  Always use your most recent med list. amLODIPine 10 mg tablet Commonly known as:  Jacquetta Couch Take 1 Tab by mouth daily. aspirin, buffered 81 mg Tab Take  by mouth daily. Taking twice weekly  
  
 atorvastatin 40 mg tablet Commonly known as:  LIPITOR Take 1 Tab by mouth daily. Replaces pravastatin  
  
 metoprolol succinate 25 mg XL tablet Commonly known as:  TOPROL-XL Take 1 Tab by mouth daily. omega-3 acid ethyl esters 1 gram capsule Commonly known as:  Zebedee Smoker TAKE 2 CAPSULES BY MOUTH EVERY DAY SITagliptin 100 mg tablet Commonly known as:  Rah Guilderland Take 1 Tab by mouth daily. Prescriptions Sent to Pharmacy Refills  
 atorvastatin (LIPITOR) 40 mg tablet 1 Sig: Take 1 Tab by mouth daily. Replaces pravastatin Class: Normal  
 Pharmacy: [address], [address] Ph #: [phone] Route: Oral  
 amLODIPine (NORVASC) 10 mg tablet 1 Sig: Take 1 Tab by mouth daily. Class: Normal  
 Pharmacy: [address] Ph #: [phone] Route: Oral  
 SITagliptin (JANUVIA) 100 mg tablet 1 Sig: Take 1 Tab by mouth daily.   
 Class: Normal  
 Pharmacy: [facility] [address] [place] [address] of [address] Ph #: [phone] Route: Oral  
 metoprolol succinate (TOPROL-XL) 25 mg XL tablet 1 Sig: Take 1 Tab by mouth daily. Class: Normal  
 Pharmacy: [facility] [address] [place] [address] Drive, [address] of [address] Ph #: [phone] Route: Oral  
  
We Performed the Following CHINO W/REFLEX [22543 CPT(R)] C REACTIVE PROTEIN, QT [48488 CPT(R)] CBC WITH AUTOMATED DIFF [32113 CPT(R)] HEMOGLOBIN A1C WITH EAG [54338 CPT(R)] HM DIABETES FOOT EXAM [HM7 Custom] METABOLIC PANEL, BASIC [36799 CPT(R)] RHEUMATOID FACTOR, QL [id number] CPT(R)] SED RATE (ESR) [id number] CPT(R)] URIC ACID [id number] CPT(R)] Follow-up Instructions Return in about 3 months (around 7/10/2018) for Follow up. To-Do List   
 04/10/2018 Imaging:  XR FOOT LT MIN 3 V Patient Instructions (Preventive care checklist to be included in patient instructions) Discussed today Done Previously Not Needed X declines   Pneumococcal vaccines X declines   Flu vaccine X declines   Hepatitis B vaccine (if at risk) X declines   Shingles vaccine X declines   TDAP vaccine  
  x Digital rectal exam  
 x  PSA X 5 years ago, bleeding  Colorectal cancer screening  
  x Low-dose CT for lung cancer screening  
  x Bone density test  
 x  Glaucoma screening  
 x  Cholesterol test  
 x  Diabetes screening test   
 x  Diabetes self-management class  
  x Nutritionist referral for diabetes or renal disease  
 
 
  
????????? ???????? ????? 66 ???: ??????????? ?????????? - [ Well Visit, Over 72: Care Instructions ] ?????????? ?? ??????? ????? 
??????? ? ????? ????? ?????? ?????????? ????????. ???? ???????? ????? ????????? ?????? ???????? ? ????? ???? ??????????? ???????????? ?? ?????? ?????. ????? ????, ???? ????? ????????? ???????. ??? ???????????? ??????????? ????????? ?????????, ????????? ??????????? ??????????? ???????????? ? ?????????? ???? ????. 
??????????? ?????????? ? ????  ?????? ????? ?????? ??????? ? ????? ?????? ????????.??????????? ????????? ?????, ???? ??? ??? ???????? ?????. ??? ????????? ??????? ????????? ?????? ?????. ????????????? ????? ?????? ????????, ??????? ?? ??????????, ? ????? ?? ????? ?????????? ????? ????????. 
??? ???????? ? ???????? ????????? 
· ???????????? ???? ??? ? ????????????? ???. ??? ?????? ???? ?????? ???????????, ???????? ????????, ????????? ???????, ??????????? ?????? ? ????????? ????????????? ????????. 
· ?????????? ?????????? ??????????? ???????????? ?? ????? ??? ?? 30 ????? ? ????, ?????????? ?????????. ??? ????? ????? ??????? ??????. ???????? ? ?????? ???? ?????????? ??????????, ???????? ???, ????????, ???? ?? ??????????, ?????? ??? ????????? ???? ??????. 
· ?? ??????. ??????? ????? ??????? ???????? ?? ?????????. ???? ?? ????????? ??????? ?????? ? ??? ????????? ??????, ???????? ?????? ????? ? ?????????? ? ??????????, ??????? ??????? ?????????? ?? ???? ????????. ??? ???????? ????? ???????? ???? ????? ???????? ??????? ??????. 
· ?????? ???????? ?????????????? ???? ?? ??????? ????, ???????? ??? ?????????? ?????. ??????????? ??????????? ?????????????? ????? ???????? ??????? ? ???????????? ???????? (SPF) 30 ? ????. ??????????? ?? ?????? ????, ???? ???? ????????. 
· ???? ??? ??? ???? ? ??? ?????????? ???????? ??????????? ??? ????????????????? ??????? ? ????????????? ?????? ?????. 
· ? ?????????? ??????????????? ?????? ????????????. 
· ?????????? ???????????? ????????: ?? ????? 2 ????? ???????? ? ???? ??? ?????? ? ?? ????? 1 ????? ??? ??????. ??????? ??????? ????? ?????????????? ???????? ????? ???????? ? ????????????? ??????? ?? ?????????. 
?????????? ???????????? ????? ?? ????? ???????? ? ??????????? ?????? ????????????. ??? ???????????? ???????? ??????? ??????????? ?? ?????? ??????. 
??? ?????? ? ?????? 
 · ??????????. ??? ???? ????????? ???, ??? ????? ??? ?????? ? ??????????? ?? ?????? ????????? ???????? ? ?????? ????????, ??????? ????? ???????? ???? ???????? ? ????????. 
· ???????????? ????????. ????????? ???? ???????????? ???????? ?? ????? ????????????????? ???????. ??? ???? ????????? ???, ??? ????? ??????? ????????? ???????????? ???????? ? ??????????? ?? ????????, ??????????? ?????? ????????????? ???????? ? ?????? ????????. 
· ???????? ??????. ??????? ? ?????, ??????? ?? ??? ????? ??????? ??? ????????? ????????? ???????. 
· ??????. ???????? ??????????? ????????? ????????? ???????????? ??? ????????? ???????? ? ?????? ?????????? ??????????? ????. 
· ????. ???????? ?????, ???? ?? ???????? ? ???? ?????-???? ????????? ?????. ?? ?????? ?????? ????????? ??? ??????????? ????, ????????? ?????? ?? ???????. 
· ???????????? ??? ????????? ???? ???????? ?????????. ????????? ???????????? ??? ????????? ???? ???????? ????????? ? ???????????? ? ?????????????? ?????? ?????. ????? ????????? ???? ?? ?????????? ????? ????????????. 
· ???? ???????? ? ????????. ?? ???? ?????? ???? ? 46 ??? ??????? ????????? ?????? ????? ???????? ???????? ? ????????. ???? ?????????? ????? ???????, ??? ??? ???????, ???????????? ????????, ??????????, ??????? ??? ???????? ??????, ??? ?????? ????? ???????? ???????? ??? ???????? ? ??????? ????????? 10 ???. 
· ??????????. ?????????? ? ?????? ? ???, ??????? ?? ??? ????????? ???????????? ?? ??????????? ????????? ?????? ??? ????????? ?? ??????????. ????? ??????? ? ?????, ???? ?? ??? ????????? ??????? ??????? ? ???????? ? ????????? D. 
??? ?????? 
· ????? ? ????? ????? ? ???????????????? ????????????. ????????, ??? ?????? ?? ???? ??????? ????? ?? ????? ?????. ?????????? ?? ????? ??????, ??????? ?? ??? ?????????? ??? ?????????? ??????? ????? ?? ????? ?????. 
· ???????????? ???????? ????? ? ???????????. ???????, ??? ????? ??? ??????? ????????? ???????????, ??????? ???????? ?????????????????? ????????????? ???????? ?????. ??????????? ????? ??????? ??? ???????? ?????? ?? ????, ??? ??? ????? ?????????????, ????? ??? ????? ????? ????????. 
· ??????????? ?????????? ??????. ?????????? ? ?????? ? ???, ??????? ?? ??? ????????? ?????????? ?????? ? ?????? ??????????? ???????????? ???????. ? ?????? ?????????? ???? ???????? ??????????? ?????????? ??????. 
??? ?????? 
· ???????????? ?????????????? ??????. ??????? ? ???????? ?????, ??????? ?? ??? ??????? ?????? ????? ?? ?????? ???? ?????????????? ??????  ???????????????????? ??????? (???). ??????????? ?????????? ?? ??????, ????? ?? ???????? ??????????? ??????? ???? ??????. ????????? ???????? ??????????? ???????? ???????????? ? ????? ????? ??????? ?? ????? ??????. 
· ????????? ??????? ?????. ??????? ? ???????? ?????, ??????? ?? ??? ?????? ???????????? ??? ????????? ?????????. ??? ????? ????????????? ????????????, ???? ?? ?????-?????? ?????? ??? ? ?????? ????????, ?????, ?????? ??? ??????? ???? ?????????. 
????? ????? ?????????? ?? ???????? 
??????????? ?????????? ?? ?????????? ?????? ???????? ? ??????????? ??????????? ? ????? ??? ????????? ????? ??? ??????????? ??? ?????????. 
??? ????? ???????? ?????????????? ??????????? 
????????? [email or web address] ????? [id number] ? ?????? ?????? ?????????????? ?????????? \"????????? ???????? ????? 66 ???: ??????????? ?????????? - [ Well Visit, Over 72: Care Instructions ]. \" 
??????? ?????? ??: ??? 16, 2017 
?????? ????????: 11.4 © [id number] [employer]. ??????????? ??????????, ?????????????? ? ???????????? ? ???????? ???????? ????? ??????. ???? ? ??? ????????? ??????? ? ?????-???? ??????????? ??? ?? ??????????, ?????????? ? ?????? ??????????, ??????? ?? ?????? ?????. ???????? [employer] ?? ???? ???????? ? ?? ????? ??????? ??????????????? ?? ??????????? ????????????? ?????? ??????????. Introducing [facility] SERVICES! [facility] introduces [employer] patient portal. Now you can access parts of your medical record, email your doctor's office, and request medication refills online. 1. In your internet browser, go to [email or web address] 2. Click on the First Time User? Click Here link in the Sign In box. You will see the New Member Sign Up page. 3. Enter your [employer] Access Code exactly as it appears below. You will not need to use this code after youve completed the sign-up process. If you do not sign up before the expiration date, you must request a new code. · [employer] Access Code: [email or web address] Expires: 7/9/2018  9:12 AM 
 
4. Enter the last four digits of your Social Security Number (xxxx) and Date of Birth (mm/dd/yyyy) as indicated and click Submit. You will be taken to the next sign-up page. 5. Create a [employer] ID. This will be your [employer] login ID and cannot be changed, so think of one that is secure and easy to remember. 6. Create a [employer] password. You can change your password at any time. 7. Enter your Password Reset Question and Answer. This can be used at a later time if you forget your password. 8. Enter your e-mail address. You will receive e-mail notification when new information is available in [address] 9. Click Sign Up. You can now view and download portions of your medical record. 10. Click the Download Summary menu link to download a portable copy of your medical information. If you have questions, please visit the Frequently Asked Questions section of the [employer] website. Remember, [employer] is NOT to be used for urgent needs. For medical emergencies, dial 911. Now available from your iPhone and Android! Please provide this summary of care documentation to your next provider. Your primary care clinician is listed as Misha Mccormick. If you have any questions after today's visit, please call [phone].

## 2018-04-10 NOTE — PROGRESS NOTES
[facility]  [place], [address]  [phone]             Date of visit: 4/10/18       This is a Subsequent Medicare Annual Wellness Visit (AWV), (Performed more than 12 months after effective date of Medicare Part B enrollment and 12 months after last preventive visit)    I have reviewed the patient's medical history in detail and updated the computerized patient record. Karmen Queen is a 69 y.o. male   History obtained from: patient, wife.     Concerns today     -bilateral foot pain hard to walk for about a year  Hurt all day every day  Fracture in right foot first about 5 years ago around the first MCP joint  Both feet hurt, sometimes right more than left  Hurts in ankles too  No pain in hands, shoulders, elbows or hips  Knees ok (had steroid shot right knee with Dr. Silvia Hinkle not long ago that helped)  Dr. Alisha Dotson podiatry told him he needs surgery but he reallydoesn't want to do that now (on first right MCP joint)  -cardiology np stopped his diovan due to elevated creatinine, on norvasc now  -does [employer] 3x per week bike  -not taking pills for pain but using various otc creams, heat for foot pain  -hasn't checked sugar recently  His metformin was discontinued by cardiologist  -swollen foot and ankles for about 1 year, not new  -has had prostate checked in past was ok, symptoms mild, doesn't want further work up  Mainly just concerned about his feet  -had aches on zocor but hasn't tried lipitor; on pravastatin 80    History     Patient Active Problem List   Diagnosis Code    Chronic foot pain, right M79.671, G89.29    Essential hypertension I10    Controlled type 2 diabetes mellitus without complication, without long-term current use of insulin ([address] E11.9    Mixed hyperlipidemia E78.2    Type 2 diabetes mellitus with nephropathy ([address] E11.21    Microalbuminuria R80.9    Elevated serum creatinine R79.89    Chronic ankle pain, bilateral M25.571, G89.29, M25.572    Foot pain, bilateral M79.671, M79.672    Urinary hesitancy R39.11     Past Medical History:   Diagnosis Date    Diabetes ([address]     type 2; niddm    Hypertension     Ill-defined condition     HIGH CHOLESTEROL      Past Surgical History:   Procedure Laterality Date    CARDIAC SURG PROCEDURE UNLIST  2018    2 stents placed     HX APPENDECTOMY  1956    HX CATARACT REMOVAL Right     HX ENDOSCOPY      h pylori moderate chronic gastritis    HX GI  2013    COLONOSCOPY    HX HEENT Bilateral     STRABISMUS CORRECTION     No Known Allergies  Current Outpatient Prescriptions   Medication Sig Dispense Refill    atorvastatin (LIPITOR) 40 mg tablet Take 1 Tab by mouth daily. Replaces pravastatin 90 Tab 1    amLODIPine (NORVASC) 10 mg tablet Take 1 Tab by mouth daily. 90 Tab 1    SITagliptin (JANUVIA) 100 mg tablet Take 1 Tab by mouth daily. 90 Tab 1    metoprolol succinate (TOPROL-XL) 25 mg XL tablet Take 1 Tab by mouth daily. 90 Tab 1    omega-3 acid ethyl esters (LOVAZA) 1 gram capsule TAKE 2 CAPSULES BY MOUTH EVERY  Cap 0    Aspirin, Buffered 81 mg tab Take  by mouth daily.  Taking twice weekly       Family History   Problem Relation Age of Onset    Diabetes Mother     Hypertension Mother     Other Father       age 55, [de-identified] stroke    Diabetes Sister     Hypertension Sister     Anesth Problems Neg Hx      Social History   Substance Use Topics    Smoking status: Former Smoker     Packs/day: 0.75     Years: 25.00     Quit date: 2004    Smokeless tobacco: Never Used    Alcohol use No       Specialists/Care Team   [employer] has established care with the following healthcare providers:  Patient Care Team:  Pratima Blackman MD as PCP - General (Family Practice)  Chapis Cifuentes MD (Ophthalmology)  Pauline Sales MD (Ophthalmology)  Adore Yi DPM (Podiatry)  Sarah Beth Rooney MD (Orthopedic Surgery)  Shirley Balbuena MD (Cardiology)    Health Risk Assessment     Demographics   male  69 y.o. General Health Questions   -During the past 4 weeks:   -how would you rate your health in general? Good   -how often have you been bothered by feeling dizzy when standing up? never   -how much have you been bothered by bodily pain? moderately   -Have you noticed any hearing difficulties? no   -has your physical and emotional health limited your social activities with family or friends? Yes, only the foot pain    Emotional Health Questions   -Do you have a history of depression, anxiety, or emotional problems? no  -Over the past 2 weeks, have you felt down, depressed or hopeless? Yes due to foot pain  -Over the past 2 weeks, have you felt little interest or pleasure in doing things? no    Health Habits   Please describe your diet habits: eats very well,a voids salt and sugar, drinks water coffee and tea, some diet cola  Do you get 5 servings of fruits or vegetables daily? yes  Do you exercise regularly? Yes, [employer] 3x per week bike    Activities of Daily Living and Functional Status   -Do you need help with eating, walking, dressing, bathing, toileting, the phone, transportation, shopping, preparing meals, housework, laundry, medications or managing money? no  -In the past four weeks, was someone available to help you if you needed and wanted help with anything? yes  -Are you confident are you that you can control and manage most of your health problems? yes  -Have you been given information to help you keep track of your medications? yes  -How often do you have trouble taking your medications as prescribed? never    Fall Risk and Home Safety   Have you fallen 2 or more times in the past year? no  Does your home have rugs in the hallway, lack grab bars in the bathroom, lack handrails on the stairs or have poor lighting? They think home is safe  Do you have smoke detectors and check them regularly?  yes  Do you have difficulties driving a car? no  Do you always fasten your seat belt when you are in a car? yes    Review of Systems (if indicated for problems addressed today)   GI: denies hematochezia  : admits to straining at times to get urine to come out  Card: denies chest pain  Pulm: denies shortness of breath      Physical Examination     Vitals:    04/10/18 0826 04/10/18 0908   BP: 163/89 138/80   Pulse: (!) 57    Resp: 16    Temp: 96.3 °F (35.7 °C)    TempSrc: Oral    SpO2: 99%    Weight: 194 lb (88 kg)    Height: 5' 4\" (1.626 m)      Body mass index is 33.3 kg/(m^2). No exam data present  Was the patient's timed Up & Go test unsteady or longer than 30 seconds? Yes, hobbles on his feet    Evaluation of Cognitive Function   Mood/affect:  happy  Orientation: normal   Appearance: age appropriate  Family member/caregiver input: wife thinks he is doing well other than pains    Additional exam if indicated for problems addressed today:  General: stated age, obese and in NAD  Neck: supple, symmetrical, trachea midline, no adenopathy and thyroid: not enlarged, symmetric, no tenderness/mass/nodules  Lungs:  clear to auscultation w/o rales, rhonchi, wheezes w/normal effort and no use of accessory muscles of respiration   Heart: regular rate and rhythm, S1, S2 normal, no murmur, click, rub or gallop  Abdomen: soft, nontender  Ext:  1+ nonpitting bilateral ankle edema noted. MSK: both feet and ankles diffusely tender especially over MTPs, right great toe and MTP slightly red, otherwise no redness.  Whole feet and ankles are swollen  Lymph: no cervical adenopathy appreciated  Skin:  Normal. and no rash or abnormalities   Psych: alert and oriented to person, place, time and situation and Speech: appropriate quality, quantity and organization of sentences     Advice/Referrals/Counseling (as indicated)   Education and counseling provided for any problems identified above: diet, exercise    Preventive Services     Health Maintenance   Topic Date Due    EYE EXAM RETINAL OR DILATED Q1 02/14/1950    ZOSTER VACCINE AGE 60>  12/14/2000    GLAUCOMA SCREENING Q2Y  02/14/2006    Pneumococcal 65+ High/Highest Risk (1 of 2 - PCV13) 02/14/2006    HEMOGLOBIN A1C Q6M  07/02/2018    MICROALBUMIN Q1  01/02/2019    LIPID PANEL Q1  01/02/2019    FOOT EXAM Q1  04/10/2019    MEDICARE YEARLY EXAM  04/11/2019    DTaP/Tdap/Td series (2 - Td) 08/06/2025    Influenza Age 9 to Adult  Addressed     (Preventive care checklist to be included in patient instructions)  Discussed today Done Previously Not Needed    X declines   Pneumococcal vaccines   X declines   Flu vaccine   X declines   Hepatitis B vaccine (if at risk)   X declines   Shingles vaccine   X declines   TDAP vaccine     x Digital rectal exam    x  PSA    X 5 years ago, bleeding  Colorectal cancer screening     x Low-dose CT for lung cancer screening     x Bone density test    x  Glaucoma screening    x  Cholesterol test    x  Diabetes screening test     x  Diabetes self-management class     x Nutritionist referral for diabetes or renal disease     Discussion of Advance Directive   Discussed with Maurice Desai his ability to prepare and advance directive in the case that an injury or illness causes him to be unable to make health care decisions. Sent home with slinkset choices folder and information to contact nurse navigator if questions. Assessment/Plan   Z00.00    ICD-10-CM ICD-9-CM    1. Medicare annual wellness visit, subsequent Z00.00 V70.0    2. Type 2 diabetes mellitus with nephropathy (HCC) E11.21 250.40 HEMOGLOBIN A1C WITH EAG     758.51 METABOLIC PANEL, BASIC       DIABETES FOOT EXAM   3. Essential hypertension I10 401.9 CBC WITH AUTOMATED DIFF   4. Mixed hyperlipidemia E78.2 272.2    5. Elevated serum creatinine R79.89 790.99    6. Foot pain, bilateral M79.671 729.5 URIC ACID    M79.672  RHEUMATOID FACTOR, QL      CHINO W/REFLEX      SED RATE (ESR)      C REACTIVE PROTEIN, QT      XR FOOT LT MIN 3 V   7.  Chronic ankle pain, bilateral M25.571 719.47     G89.29 338.29     M25.572     8. Controlled type 2 diabetes mellitus without complication, without long-term current use of insulin (HCC) E11.9 250.00    9. Urinary hesitancy R39.11 788.64        Orders Placed This Encounter    XR FOOT LT MIN 3 V    HEMOGLOBIN A1C WITH EAG    METABOLIC PANEL, BASIC    URIC ACID    RHEUMATOID FACTOR, QL    CHINO W/REFLEX    SED RATE (ESR)    C REACTIVE PROTEIN, QT    CBC WITH AUTOMATED DIFF    HM DIABETES FOOT EXAM    DISCONTD: amLODIPine (NORVASC) 10 mg tablet    atorvastatin (LIPITOR) 40 mg tablet    amLODIPine (NORVASC) 10 mg tablet    SITagliptin (JANUVIA) 100 mg tablet    metoprolol succinate (TOPROL-XL) 25 mg XL tablet     Preventive care as above  Foot pain bothering him and really limiting him  Will do xray left foot (had xray right foot with podiatry who recommended surgery; he is not interested in that)  Will do labs, then come up with plan for pain control  Encouraged a little weight loss for his health, may also help his pain  DM generally controlled but now not on metformin, will check labs and go from there  Losartan was also stopped due to elevated creatinine, but if stable may need it to protect his kidneys  bp doing well enough on the norvasc; do wonder if that has made his feet more swollen, although they say he did have swelling/pain before that. Change statin to one that is more potent as he is diabetic  Urinary hesitancy not bothering him enough to want med or referral; psa recently ok    Follow-up Disposition:  Return in about 3 months (around 7/10/2018) for Follow up.     Renu Cobb MD

## 2018-04-10 NOTE — PROGRESS NOTES
Chief Complaint   Patient presents with    Diabetes     follow up    Cholesterol Problem     follow up     1. Have you been to the ER, urgent care clinic since your last visit? Hospitalized since your last visit? Yes 2/20/18 blood loss, low hgb    2. Have you seen or consulted any other health care providers outside of the [address] [place] [address] since your last visit? Include any pap smears or colon screening.  Yes When: 3/12/18 [facility]

## 2018-04-10 NOTE — PATIENT INSTRUCTIONS
(Preventive care checklist to be included in patient instructions)  Discussed today Done Previously Not Needed    X declines   Pneumococcal vaccines   X declines   Flu vaccine   X declines   Hepatitis B vaccine (if at risk)   X declines   Shingles vaccine   X declines   TDAP vaccine     x Digital rectal exam    x  PSA    X 5 years ago, bleeding  Colorectal cancer screening     x Low-dose CT for lung cancer screening     x Bone density test    x  Glaucoma screening    x  Cholesterol test    x  Diabetes screening test     x  Diabetes self-management class     x Nutritionist referral for diabetes or renal disease          ????????? ???????? ????? 66 ???: ??????????? ?????????? - [ Well Visit, Over 72: Care Instructions ]  ?????????? ?? ??????? ?????  ??????? ? ????? ????? ?????? ?????????? ????????. ???? ???????? ????? ????????? ?????? ???????? ? ????? ???? ??????????? ???????????? ?? ?????? ?????. ????? ????, ???? ????? ????????? ???????. ??? ???????????? ??????????? ????????? ?????????, ????????? ??????????? ??????????? ???????????? ? ?????????? ???? ????.  ??????????? ?????????? ? ???? -- ?????? ????? ?????? ??????? ? ????? ?????? ????????.??????????? ????????? ?????, ???? ??? ??? ???????? ?????. ??? ????????? ??????? ????????? ?????? ?????. ????????????? ????? ?????? ????????, ??????? ?? ??????????, ? ????? ?? ????? ?????????? ????? ????????.  ??? ???????? ? ???????? ?????????  · ???????????? ???? ??? ? ????????????? ???. ??? ?????? ???? ?????? ???????????, ???????? ????????, ????????? ???????, ??????????? ?????? ? ????????? ????????????? ????????.  · ?????????? ?????????? ??????????? ???????????? ?? ????? ??? ?? 30 ????? ? ????, ?????????? ?????????. ??? ????? ????? ??????? ??????. ???????? ? ?????? ???? ?????????? ??????????, ???????? ???, ????????, ???? ?? ??????????, ?????? ??? ????????? ???? ??????.  · ?? ??????. ??????? ????? ??????? ???????? ?? ?????????. ???? ?? ????????? ??????? ?????? ? ??? ????????? ??????, ???????? ?????? ????? ? ?????????? ? ??????????, ??????? ??????? ?????????? ?? ???? ????????. ??? ???????? ????? ???????? ???? ????? ???????? ??????? ??????.  · ?????? ???????? ?????????????? ???? ?? ??????? ????, ???????? ??? ?????????? ?????. ??????????? ??????????? ?????????????? ????? ???????? ??????? ? ???????????? ???????? (SPF) 30 ? ????. ??????????? ?? ?????? ????, ???? ???? ????????.  · ???? ??? ??? ???? ? ??? ?????????? ???????? ??????????? ??? ????????????????? ??????? ? ????????????? ?????? ?????.  · ? ?????????? ??????????????? ?????? ????????????.  · ?????????? ???????????? ????????: ?? ????? 2 ????? ???????? ? ???? ??? ?????? ? ?? ????? 1 ????? ??? ??????. ??????? ??????? ????? ?????????????? ???????? ????? ???????? ? ????????????? ??????? ?? ?????????.  ?????????? ???????????? ????? ?? ????? ???????? ? ??????????? ?????? ????????????. ??? ???????????? ???????? ??????? ??????????? ?? ?????? ??????.  ??? ?????? ? ??????  · ??????????. ??? ???? ????????? ???, ??? ????? ??? ?????? ? ??????????? ?? ?????? ????????? ???????? ? ?????? ????????, ??????? ????? ???????? ???? ???????? ? ????????.  · ???????????? ????????. ????????? ???? ???????????? ???????? ?? ????? ????????????????? ???????. ??? ???? ????????? ???, ??? ????? ??????? ????????? ???????????? ???????? ? ??????????? ?? ????????, ??????????? ?????? ????????????? ???????? ? ?????? ????????.  · ???????? ??????. ??????? ? ?????, ??????? ?? ??? ????? ??????? ??? ????????? ????????? ???????.  · ??????. ???????? ??????????? ????????? ????????? ???????????? ??? ????????? ???????? ? ?????? ?????????? ??????????? ????.  · ????. ???????? ?????, ???? ?? ???????? ? ???? ?????-???? ????????? ?????. ?? ?????? ?????? ????????? ??? ??????????? ????, ????????? ?????? ?? ???????.  · ???????????? ??? ????????? ???? ???????? ?????????. ????????? ???????????? ??? ????????? ???? ???????? ????????? ? ???????????? ? ?????????????? ?????? ?????. ????? ????????? ???? ?? ?????????? ????? ????????????.  · ???? ???????? ? ????????. ?? ???? ?????? ???? ? 4-6 ??? ??????? ????????? ?????? ????? ???????? ???????? ? ????????. ???? ?????????? ????? ???????, ??? ??? ???????, ???????????? ????????, ??????????, ??????? ??? ???????? ??????, ??? ?????? ????? ???????? ???????? ??? ???????? ? ??????? ????????? 10 ???.  · ??????????. ?????????? ? ?????? ? ???, ??????? ?? ??? ????????? ???????????? ?? ??????????? ????????? ?????? ??? ????????? ?? ??????????. ????? ??????? ? ?????, ???? ?? ??? ????????? ??????? ??????? ? ???????? ? ????????? D.  ??? ??????  · ????? ? ????? ????? ? ???????????????? ????????????. ????????, ??? ?????? ?? ???? ??????? ????? ?? ????? ?????. ?????????? ?? ????? ??????, ??????? ?? ??? ?????????? ??? ?????????? ??????? ????? ?? ????? ?????.  · ???????????? ???????? ????? ? ???????????. ???????, ??? ????? ??? ??????? ????????? ???????????, ??????? ???????? ?????????????????? ????????????? ???????? ?????. ??????????? ????? ??????? ??? ???????? ?????? ?? ????, ??? ??? ????? ?????????????, ????? ??? ????? ????? ????????.  · ??????????? ?????????? ??????. ?????????? ? ?????? ? ???, ??????? ?? ??? ????????? ?????????? ?????? ? ?????? ??????????? ???????????? ???????. ? ?????? ?????????? ???? ???????? ??????????? ?????????? ??????.  ??? ??????  · ???????????? ?????????????? ??????. ??????? ? ???????? ?????, ??????? ?? ??? ??????? ?????? ????? ?? ?????? ???? ?????????????? ?????? -- ???????????????????? ??????? (???). ??????????? ?????????? ?? ??????, ????? ?? ???????? ??????????? ??????? ???? ??????. ????????? ???????? ??????????? ???????? ???????????? ? ????? ????? ??????? ?? ????? ??????.  · ????????? ??????? ?????. ??????? ? ???????? ?????, ??????? ?? ??? ?????? ???????????? ??? ????????? ?????????. ??? ????? ????????????? ????????????, ???? ?? ?????-?????? ?????? ??? ? ?????? ????????, ?????, ?????? ??? ??????? ???? ?????????.  ????? ????? ?????????? ?? ????????  ??????????? ?????????? ?? ?????????? ?????? ???????? ? ??????????? ??????????? ? ????? ??? ????????? ????? ??? ??????????? ??? ?????????.  ??? ????? ???????? ?????????????? ???????????  ????????? [email or web address] ????? [id number] ? ?????? ?????? ?????????????? ?????????? \"????????? ???????? ????? 65 ???: ??????????? ?????????? - [ Well Visit, Over 72: Care Instructions ]. \"  ??????? ?????? ??: ??? 16, 2017  ?????? ????????: 11.4  © 7420-7390 [employer]. ??????????? ??????????, ?????????????? ? ???????????? ? ???????? ???????? ????? ??????. ???? ? ??? ????????? ??????? ? ?????-???? ??????????? ??? ?? ??????????, ?????????? ? ?????? ??????????, ??????? ?? ?????? ?????. ????????  [employer] ?? ???? ???????? ? ?? ????? ??????? ??????????????? ?? ??????????? ????????????? ?????? ??????????.

## 2018-04-11 LAB
ANA SER QL: NEGATIVE
BASOPHILS # BLD AUTO: 0 X10E3/UL (ref 0–0.2)
BASOPHILS NFR BLD AUTO: 0 %
BUN SERPL-MCNC: 19 MG/DL (ref 8–27)
BUN/CREAT SERPL: 17 (ref 10–24)
CALCIUM SERPL-MCNC: 9.7 MG/DL (ref 8.6–10.2)
CHLORIDE SERPL-SCNC: 103 MMOL/L (ref 96–106)
CO2 SERPL-SCNC: 26 MMOL/L (ref 18–29)
CREAT SERPL-MCNC: 1.1 MG/DL (ref 0.76–1.27)
CRP SERPL-MCNC: 12.3 MG/L (ref 0–4.9)
EOSINOPHIL # BLD AUTO: 0.1 X10E3/UL (ref 0–0.4)
EOSINOPHIL NFR BLD AUTO: 2 %
ERYTHROCYTE [DISTWIDTH] IN BLOOD BY AUTOMATED COUNT: 15.8 % (ref 12.3–15.4)
ERYTHROCYTE [SEDIMENTATION RATE] IN BLOOD BY WESTERGREN METHOD: 33 MM/HR (ref 0–30)
EST. AVERAGE GLUCOSE BLD GHB EST-MCNC: 140 MG/DL
GFR SERPLBLD CREATININE-BSD FMLA CKD-EPI: 64 ML/MIN/1.73
GFR SERPLBLD CREATININE-BSD FMLA CKD-EPI: 74 ML/MIN/1.73
GLUCOSE SERPL-MCNC: 127 MG/DL (ref 65–99)
HBA1C MFR BLD: 6.5 % (ref 4.8–5.6)
HCT VFR BLD AUTO: 34.3 % (ref 37.5–51)
HGB BLD-MCNC: 10.5 G/DL (ref 13–17.7)
IMM GRANULOCYTES # BLD: 0 X10E3/UL (ref 0–0.1)
IMM GRANULOCYTES NFR BLD: 1 %
LYMPHOCYTES # BLD AUTO: 1.5 X10E3/UL (ref 0.7–3.1)
LYMPHOCYTES NFR BLD AUTO: 17 %
MCH RBC QN AUTO: 23.4 PG (ref 26.6–33)
MCHC RBC AUTO-ENTMCNC: 30.6 G/DL (ref 31.5–35.7)
MCV RBC AUTO: 77 FL (ref 79–97)
MONOCYTES # BLD AUTO: 0.8 X10E3/UL (ref 0.1–0.9)
MONOCYTES NFR BLD AUTO: 10 %
NEUTROPHILS # BLD AUTO: 6.2 X10E3/UL (ref 1.4–7)
NEUTROPHILS NFR BLD AUTO: 70 %
PLATELET # BLD AUTO: 217 X10E3/UL (ref 150–379)
POTASSIUM SERPL-SCNC: 4.1 MMOL/L (ref 3.5–5.2)
RBC # BLD AUTO: 4.48 X10E6/UL (ref 4.14–5.8)
RHEUMATOID FACT SERPL-ACNC: <10 IU/ML (ref 0–13.9)
SODIUM SERPL-SCNC: 143 MMOL/L (ref 134–144)
URATE SERPL-MCNC: 5.4 MG/DL (ref 3.7–8.6)
WBC # BLD AUTO: 8.6 X10E3/UL (ref 3.4–10.8)

## 2018-04-14 ENCOUNTER — TELEPHONE (OUTPATIENT)
Dept: FAMILY MEDICINE CLINIC | Age: 78
End: 2018-04-14

## 2018-04-14 NOTE — PROGRESS NOTES
Spoke with wife, also sent in letter:  Here is a copy of your lab results. Sugar is doing fine, even without the metformin you used to take. Kidney and electrolytes were normal, as were tests for gout, rheumatoid arthritis, and lupus. What concerns me is that you are anemic, and your CRP test for inflammation in your body is high. We will try to figure this out and get you feeling better!

## 2018-04-14 NOTE — TELEPHONE ENCOUNTER
Spoke with wife about his results (he doesn't speak much English so wants me to communicate)    Recommended he come back in Tuesday for more testing for anemia and high CRP. Plan to do anemia work up, more xrays. She says he hasn't been well for 2 months now. They have not seen blood in his stool.

## 2018-04-16 NOTE — TELEPHONE ENCOUNTER
Pharmacy on file verified  Pharmacy is requesting a prescription for the following:  CONTOUR NEXT TEST STRIPS 50's

## 2018-04-17 ENCOUNTER — OFFICE VISIT (OUTPATIENT)
Dept: FAMILY MEDICINE CLINIC | Age: 78
End: 2018-04-17

## 2018-04-17 ENCOUNTER — HOSPITAL ENCOUNTER (OUTPATIENT)
Dept: LAB | Age: 78
Discharge: HOME | End: 2018-04-17
Payer: MEDICARE

## 2018-04-17 VITALS
DIASTOLIC BLOOD PRESSURE: 68 MMHG | WEIGHT: 189 LBS | RESPIRATION RATE: 16 BRPM | OXYGEN SATURATION: 97 % | HEIGHT: 64 IN | BODY MASS INDEX: 32.27 KG/M2 | SYSTOLIC BLOOD PRESSURE: 127 MMHG | TEMPERATURE: 96.7 F | HEART RATE: 58 BPM

## 2018-04-17 DIAGNOSIS — M54.50 CHRONIC MIDLINE LOW BACK PAIN WITHOUT SCIATICA: ICD-10-CM

## 2018-04-17 DIAGNOSIS — M25.572 CHRONIC PAIN OF BOTH ANKLES: Primary | ICD-10-CM

## 2018-04-17 DIAGNOSIS — M25.571 CHRONIC PAIN OF BOTH ANKLES: Primary | ICD-10-CM

## 2018-04-17 DIAGNOSIS — D64.9 ANEMIA, UNSPECIFIED TYPE: ICD-10-CM

## 2018-04-17 DIAGNOSIS — G89.29 CHRONIC PAIN OF BOTH ANKLES: Primary | ICD-10-CM

## 2018-04-17 DIAGNOSIS — E11.9 CONTROLLED TYPE 2 DIABETES MELLITUS WITHOUT COMPLICATION, WITHOUT LONG-TERM CURRENT USE OF INSULIN (HCC): ICD-10-CM

## 2018-04-17 DIAGNOSIS — M79.671 CHRONIC FOOT PAIN, RIGHT: ICD-10-CM

## 2018-04-17 DIAGNOSIS — G89.29 CHRONIC FOOT PAIN, RIGHT: ICD-10-CM

## 2018-04-17 DIAGNOSIS — R63.4 WEIGHT LOSS, UNINTENTIONAL: ICD-10-CM

## 2018-04-17 DIAGNOSIS — R19.5 HEME POSITIVE STOOL: ICD-10-CM

## 2018-04-17 DIAGNOSIS — G89.29 CHRONIC MIDLINE LOW BACK PAIN WITHOUT SCIATICA: ICD-10-CM

## 2018-04-17 LAB
APPEARANCE UR: CLEAR
BILIRUB UR QL STRIP: NEGATIVE
COLOR UR: YELLOW
GLUCOSE UR QL: NEGATIVE
HGB UR QL STRIP: NEGATIVE
KETONES UR QL STRIP: NEGATIVE
LEUKOCYTE ESTERASE UR QL STRIP: NEGATIVE
MICRO URNS: NORMAL
NITRITE UR QL STRIP: NEGATIVE
PH UR STRIP: 5 [PH] (ref 5–7.5)
PROT UR QL STRIP: NEGATIVE
SP GR UR: 1.02 (ref 1–1.03)
UROBILINOGEN UR STRIP-MCNC: 0.2 MG/DL (ref 0.2–1)

## 2018-04-17 PROCEDURE — 83550 IRON BINDING TEST: CPT

## 2018-04-17 PROCEDURE — 81003 URINALYSIS AUTO W/O SCOPE: CPT

## 2018-04-17 PROCEDURE — 85060 BLOOD SMEAR INTERPRETATION: CPT

## 2018-04-17 PROCEDURE — 85045 AUTOMATED RETICULOCYTE COUNT: CPT

## 2018-04-17 PROCEDURE — 82728 ASSAY OF FERRITIN: CPT

## 2018-04-17 PROCEDURE — 36415 COLL VENOUS BLD VENIPUNCTURE: CPT

## 2018-04-17 PROCEDURE — 82550 ASSAY OF CK (CPK): CPT

## 2018-04-17 RX ORDER — DEXTROMETHORPHAN HYDROBROMIDE, GUAIFENESIN 5; 100 MG/5ML; MG/5ML
650 LIQUID ORAL EVERY 8 HOURS
Qty: 90 TAB | Refills: 6 | Status: SHIPPED | OUTPATIENT
Start: 2018-04-17 | End: 2018-05-24

## 2018-04-17 NOTE — PATIENT INSTRUCTIONS
??????: ??????????? ?????????? - [ Anemia: Care Instructions ]  ?????????? ?? ??????? ?????    ??????? ???????? ?????? ??????? ??????? ?????? ?????, ??????? ???????? ???????? ?? ????? ?????????. ?????????? ????? ?????? ??????. ????? ?? ????? ???????????????? ?????? ???????? ?????????? ??????. ?????? ????????? ????????? ?????? ??? ??????????? ???????????, ???????? ? ??????? ??????? ????? (???????????), ??????? ????????? ???????? ?? ?????? ? ??????? ?????????. ??? ???????????? ?????????? ?????? ???????? ???????????? ??????? ?????????? ??????????? ???????? ???????. ? ?????????? ?????? ?????? ????????? ?? ???????? ?????????? ????????? ? ?? ?????????? ????????? ? ????????. ????? ????, ? ??? ????? ???? ???????? ? ?????????????.  ????? ???????????????? ???????? ?????????? ?????? ???????? ????????????. ? ??? ????? ??????? ??????? ????????????? ???????????? ??? ????????????, ????????? ?????, ?????????, ????? ??? ?????? ????????????. ?????????? ????? ???????? ??? ?????? ????????????????????? ??????? (????????, ??????????) ????? ????? ???????? ???????????? ? ????????? ?????. ?????????? ?????? ? ??????? ????? ???????? ??????, ???????? ? ???????, ????? ????????? ????????? ?????? ??????, ???????? ?? ????? ????????????, ? ???????????? ? ???????????? ????????.  ??? ???? ????? ????????? ??? ????????? ??????. ??????? ????? ???????????? ????????? ???????, ?????? ??? ??????? ?????? ????????? ?????. ????? ????, ???? ????? ?????????? ??? ???? ????????, ??????? ???????, ???????? ???? ??? ????.  ?????? ????? ???? ??????? ? ??????? ?????????. ??? ?? ?????? ????????? ? ?????????? ?????????? ??????. ????????? ???????????? ??????? ????? ?????? ??????? ????? ????????? ?????????? ???????.  ??????????? ?????????? ? ????  ?????? ????? ?????? ??????? ? ????? ?????? ????????.??????????? ????????? ?????, ???? ??? ??? ???????? ?????. ??? ????????? ??????? ????????? ?????? ?????. ????????????? ????? ?????? ????????, ??????? ?? ??????????, ? ????? ?? ????? ?????????? ????? ????????.  ??? ???????? ? ???????? ?????????  · ?????????? ????????? ?????? ???????? ??????????? ?????. ????????? ?????? ?????, ???? ????????, ??? ? ??? ????????? ???????? ??-?? ????????.  · ???? ???? ??????????? ??? ????????? ?????? ? ?????????, ?????????? ?? ? ???????????? ? ?????????? ?????:  ¨ ???????????? ????????? ???????? ??????? ?? 1 ??? ?? ??? ????? 2 ???? ????? ?????? ????. ?????? ??? ????? ????????????? ????????? ?????? ?????? ? ????? ??? ???????????? ???????????? ???????.  ¨ ?? ?????????? ???????? ? ?? ????? ?????? ??? ??????? ? ???????? (????????, ????, ??? ??? ????) ? ?? ?? ????? ??? ? ??????? 2 ????? ????? ?????? ??????. ??? ????? ?????????? ?????????? ?????? ? ?????????.  ¨ ??????? ? (?? ???? ??? ??????? ???????) ???????? ?????? ??????????? ? ?????????. ???????????? ????????? ????????? ?????? ? ????????? ?????? ?? ???????? ????????????? ???? ??? ?????? ?????????, ??????? ????????? ?, ???????? ???????????.  ¨ ????????? ?????? ? ????????? ????? ???????? ???????????? ???????????, ???????? ??????, ???????, ?????, ????? ??? ??????. ?????????? ???????? ????? ? ???????? ? ?????????? ?????? ??????, ????? ? ?????????. ????????? ?????? ? ????????? ????? ?????????? ???? ? ?????? ??? ??????? ????.  ¨ ???? ?? ?????? ??????? ???????? ????????? ??????, ?? ?????????? ??????? ???? ??? ????????? ?????? ?????????.  ¨ ??????? ???????? ????????? ?????? ? ??????????? ??? ????? ?????. ????????????? ?????? ????? ???? ????? ???????.  · ?????????? ???????? ????? ?? ???????????? ?????????, ??????? ???????. ? ??? ????????? ??????? ????, ???????? ? ????????????, ?????, ????, ????, ????, ?????????????? ???? ? ??????? ???????? ?????.  · ???????? ????? ?? ????, ????? ??? ????????? ??????.  ????? ????? ?????????? ?? ????????  ????????? 911, ??? ?????? ?? ???????, ??? ??? ????? ?????????? ??????. ????????, ??????? ? ????????? ???????.  · ? ??? ????????? ???????? ???????? ????????. ? ????? ???? ????????? ????? ??????????:  ¨ ???? ??? ??????? ??????????? ? ?????, ? ????? ????????? ???????? ? ?????;  ¨ ?????????????;  ¨ ??????;  ¨ ??????? ??? ?????;  ¨ ????, ??????? ??????????? ??? ????????? ???????? ? ?????, ???, ?????? ???????, ? ??????? ????? ??????, ? ????? ??? ????? ??????, ? ????? ??? ????? ?????;  ¨ ?????????????? ??? ????????? ????????;  ¨ ????????? ???????????? ??? ???????? ???????? ? ????? ??????.  ???????? ?????? 911 ????? ??????????????? ??? ????????? 1 ???? ???????? ??? ???????? ??? 24 ???? ????????????? ????????. ????????? ??????? ?????? ??????. ?? ????????? ???? ????? ??????.  · ?? ?????? ? ??????? (?????? ????????).  ?????????? ??????? ???????? ??????????? ?? ??????? ??????????? ???????:  · ??? ????????? ??? ???????? ??????;  · ???? ?? ?????????? ?????????????? ??? ??? ???????, ??? ?? ?????? ?????? ? ???????;  · ??? ?????????? ??? ???????? ???????????? ? ????????;  · ??? ????????? ????????? ????????????, ????????:  ¨ ???????????? ?? ????;  ¨ ???????????? ????????????, ??????? ?????????? ?? ?????????? (????? ???????, ?????? ??? ??????????? ? ?????? ????? ??????);  ¨ ????????? ??? ??????? ???? ??? ??????????? ????????????;  ¨ ??????????? ??? ??????? ????.  ??????????? ?????????? ?? ??????????? ?????? ????????? ? ??????????? ??????????? ? ???????? ?????, ????:  · ??????? ????????? ?? ?? ?????????? ???? ?????.  ??? ????? ???????? ?????????????? ???????????  ????????? [email or web address] ????? R301 ? ?????? ?????? ?????????????? ?????????? \"??????: ??????????? ?????????? - [ Anemia: Care Instructions ]. \"  ??????? ?????? ??: ??????? 13, 2016  ?????? ????????: 11.4  © [id number] [employer]. ??????????? ??????????, ?????????????? ? ???????????? ? ???????? ???????? ????? ??????. ???? ? ??? ????????? ??????? ? ?????-???? ??????????? ??? ?? ??????????, ?????????? ? ?????? ??????????, ??????? ?? ?????? ?????. ????????  [employer] ?? ???? ???????? ? ?? ????? ??????? ??????????????? ?? ??????????? ????????????? ?????? ??????????.

## 2018-04-17 NOTE — PROGRESS NOTES
Chief Complaint   Patient presents with    Foot Pain     c/o bilateral foot pain x 3 weeks , into ankles as wel , constant ,using some type of otc pain cream , not helping       Reviewed Record in preparation for visit and have obtained necessary documentation. Identified pt with two pt identifiers (Name @ )    Health Maintenance Due   Topic    EYE EXAM RETINAL OR DILATED Q1     ZOSTER VACCINE AGE 60>     GLAUCOMA SCREENING Q2Y     Pneumococcal 65+ High/Highest Risk (1 of 2 - PCV13)         1. Have you been to the ER, urgent care clinic since your last visit? Hospitalized since your last visit? No    2. Have you seen or consulted any other health care providers outside of the [address] [place] [address] since your last visit? Include any pap smears or colon screening.  No

## 2018-04-17 NOTE — PROGRESS NOTES
Abdelrahman Briggs [address] [facility] [place] [place], [address] [facility]  [phone]/           Date of visit: 4/17/ 18  Subjective:      History obtained from:  Patient, wife. Leatha Bermeo is a 69 y.o. male who presents today for further evaluation  Had seen him recently for worsening foot pain and other problems  Found to have new microcytic anemia  Hasn't seen any bleeding    Further testing for anemia, pain in both feet/ankles for 3 weeks  Some better today  Not as swollen or painful    Has lost 5lb since visit last week    Asked about a1c result, was stable/good at 6.5    Patient Active Problem List    Diagnosis Date Noted    Iron deficiency anemia due to chronic blood loss 04/18/2018    History of gastritis 04/18/2018    Heme positive stool 04/18/2018    Chronic ankle pain, bilateral 04/10/2018    Foot pain, bilateral 04/10/2018    Urinary hesitancy 04/10/2018    Elevated serum creatinine 01/06/2018    Type 2 diabetes mellitus with nephropathy ([facility] 01/04/2018    Microalbuminuria 01/04/2018    Chronic foot pain, right 01/02/2018    Essential hypertension 01/02/2018    Controlled type 2 diabetes mellitus without complication, without long-term current use of insulin ([facility] [address] 01/02/2018    Mixed hyperlipidemia 01/02/2018     Current Outpatient Prescriptions   Medication Sig Dispense Refill    VIT A/VIT C/VIT E/ZINC/COPPER (OCUVITE PRESERVISION PO) Take 1 Tab by mouth daily.  acetaminophen (TYLENOL ARTHRITIS PAIN) 650 mg TbER Take 1 Tab by mouth every eight (8) hours. 90 Tab 6    glucose blood VI test strips (ASCENSIA CONTOUR) strip Per insurance preference, test 1x/d dx diabetes E11.9 contour next test strips 100 Strip 3    atorvastatin (LIPITOR) 40 mg tablet Take 1 Tab by mouth daily. Replaces pravastatin 90 Tab 1    amLODIPine (NORVASC) 10 mg tablet Take 1 Tab by mouth daily. 90 Tab 1    SITagliptin (JANUVIA) 100 mg tablet Take 1 Tab by mouth daily.  90 Tab 1    metoprolol succinate (TOPROL-XL) 25 mg XL tablet Take 1 Tab by mouth daily. 90 Tab 1    omega-3 acid ethyl esters (LOVAZA) 1 gram capsule TAKE 2 CAPSULES BY MOUTH EVERY  Cap 0    Aspirin, Buffered 81 mg tab Take  by mouth daily. Taking twice weekly      ferrous sulfate (IRON) 325 mg (65 mg iron) EC tablet Take 1 Tab by mouth two (2) times daily (with meals). 180 Tab 0    docusate sodium (COLACE) 100 mg capsule Take 1 Cap by mouth two (2) times a day for 90 days. With iron 180 Cap 0     No Known Allergies  Past Medical History:   Diagnosis Date    Diabetes ([facility] [address]     type 2; niddm    Hypertension     Ill-defined condition     HIGH CHOLESTEROL     Past Surgical History:   Procedure Laterality Date    CARDIAC SURG PROCEDURE UNLIST  2018    2 stents placed     HX APPENDECTOMY      HX CATARACT REMOVAL Right     HX ENDOSCOPY      h pylori moderate chronic gastritis    HX GI      COLONOSCOPY    HX HEENT Bilateral     STRABISMUS CORRECTION     Family History   Problem Relation Age of Onset    Diabetes Mother     Hypertension Mother     Other Father       age 55, [de-identified] stroke    Diabetes Sister     Hypertension Sister     Anesth Problems Neg Hx      Social History   Substance Use Topics    Smoking status: Former Smoker     Packs/day: 0.75     Years: 25.00     Quit date: 2004    Smokeless tobacco: Never Used    Alcohol use No      Social History     Social History Narrative        2 kids and 4 grandkids live closeby    Originally from [facility]        Review of Systems  Gen: denies fever  GI denies constipation or abd pain   denies urinary pain     Objective:     Vitals:    18 0951   BP: 127/68   Pulse: (!) 58   Resp: 16   Temp: 96.7 °F (35.9 °C)   TempSrc: Oral   SpO2: 97%   Weight: 189 lb (85.7 kg)   Height: 5' 4\" (1.626 m)     Body mass index is 32.44 kg/(m^2).      General: stated age, obese and in NAD  Neck: supple, symmetrical, trachea midline, no adenopathy and thyroid: not enlarged, symmetric, no tenderness/mass/nodules  Lungs:  clear to auscultation w/o rales, rhonchi, wheezes w/normal effort and no use of accessory muscles of respiration   Heart: regular rate and rhythm, S1, S2 normal, no murmur, click, rub or gallop  Abdomen: soft, nontender  Rect: prostate symmetric, a little enlarged, no masses or abnormal texture; stool brown but heme positive  Ext:  1+ BLE edema noted. Tenderness of ankles, improved  Lymph: no cervical adenopathy appreciated  Skin:  Normal. and no rash or abnormalities   Psych: alert and oriented to person, place, time and situation and Speech: appropriate quality, quantity and organization of sentences     Assessment/Plan:       ICD-10-CM ICD-9-CM    1. Chronic pain of both ankles M25.571 719.47 XR ANKLE RT MIN 3 V    G89.29 338.29 CK    M25.572     2. Anemia, unspecified type D64.9 285.9 XR CHEST PA LAT      PATHOLOGIST REVIEW SMEARS      IRON PROFILE      FERRITIN      RETICULOCYTE COUNT   3. Chronic foot pain, right M79.671 729.5     G89.29 338.29    4. Chronic midline low back pain without sciatica M54.5 724.2 XR SPINE LUMB 2 OR 3 V    G89.29 338.29    5. Weight loss, unintentional R63.4 783.21 XR CHEST PA LAT      CK      URINALYSIS W/ RFLX MICROSCOPIC   6. Controlled type 2 diabetes mellitus without complication, without long-term current use of insulin ([facility]) E11.9 250.00 URINALYSIS W/ RFLX MICROSCOPIC   7. Heme positive stool R19.5 792.1         Orders Placed This Encounter    XR CHEST PA LAT    XR SPINE LUMB 2 OR 3 V    XR ANKLE RT MIN 3 V    CK    IRON PROFILE    FERRITIN    URINALYSIS W/ RFLX MICROSCOPIC    RETICULOCYTE COUNT    VIT A/VIT C/VIT E/ZINC/COPPER (OCUVITE PRESERVISION PO)    acetaminophen (TYLENOL ARTHRITIS PAIN) 650 mg TbER    PATHOLOGIST REVIEW SMEARS       Will continue eval with meds as above and plan to call with results/plan    Discussed the diagnosis and plan and he expressed understanding.     Follow-up Disposition:  Return in about 3 months (around 7/17/2018).     Zaina Gutierrez MD

## 2018-04-17 NOTE — MR AVS SNAPSHOT
[address] [facility] 
 
 
 [address] [place] [address] 
[phone] Patient: [facility] HALEY MRN: [id number] OFY:[birth year] Visit Information Date & Time Provider Department Dept. Phone Encounter #  
 4/17/2018  9:45 AM Ranulfo Lyle, [facility] [phone] [id number] Follow-up Instructions Return in about 3 months (around 7/17/2018). Your Appointments 7/10/2018  8:45 AM  
ROUTINE CARE with Ranulfo Lyle MD  
[facility]) Appt Note: 3 months f/u appt  
 [address] [place] [address] [address]  
[phone]  
  
   
 Ross[address]agran [address] [id number]  
  
    
 10/9/2018  8:45 AM  
ROUTINE CARE with Ranulfo Lyle MD  
[address] [facility] [address] [facility]) Appt Note: 6 months f/u appt  
 [address] [place] [address]  
[phone] Upcoming Health Maintenance Date Due  
 EYE EXAM RETINAL OR DILATED Q1 2/14/1950 ZOSTER VACCINE AGE 60> 12/14/2000 GLAUCOMA SCREENING Q2Y 2/14/2006 Pneumococcal 65+ High/Highest Risk (1 of 2 - PCV13) 2/14/2006 HEMOGLOBIN A1C Q6M 10/10/2018 MICROALBUMIN Q1 1/2/2019 LIPID PANEL Q1 1/2/2019 FOOT EXAM Q1 4/10/2019 MEDICARE YEARLY EXAM 4/11/2019 DTaP/Tdap/Td series (2 - Td) 8/6/2025 Allergies as of 4/17/2018  Review Complete On: 4/17/2018 By: Ranulfo Lyle MD  
 No Known Allergies Current Immunizations  Reviewed on 12/26/2017 Name Date Td 9/30/1999, 3/29/1999, 1/28/1999 Tdap 8/6/2015 Not reviewed this visit You Were Diagnosed With   
  
 Codes Comments Chronic pain of both ankles    -  Primary ICD-10-CM: M25.571, G89.29, M25.572 ICD-9-CM: 719.47, 338.29 Anemia, unspecified type     ICD-10-CM: D64.9 ICD-9-CM: 063. 9 Chronic foot pain, right     ICD-10-CM: M79.671, G89.29 ICD-9-CM: 729.5, 338.29   
 Chronic midline low back pain without sciatica     ICD-10-CM: M54.5, G89.29 ICD-9-CM: 724.2, 338.29 Weight loss, unintentional     ICD-10-CM: R63.4 ICD-9-CM: 783.21 Controlled type 2 diabetes mellitus without complication, without long-term current use of insulin ([facility]     ICD-10-CM: E11.9 ICD-9-CM: 250.00 Vitals BP Pulse Temp Resp Height(growth percentile) Weight(growth percentile) 127/68 (BP 1 Location: Right arm, BP Patient Position: Sitting) (!) 58 96.7 °F (35.9 °C) (Oral) 16 5' 4\" (1.626 m) 189 lb (85.7 kg) SpO2 BMI Smoking Status 97% 32.44 kg/m2 Former Smoker BMI and BSA Data Body Mass Index Body Surface Area  
 32.44 kg/m 2 1.97 m 2 Preferred Pharmacy Pharmacy Name Phone [facility] [address] [place] [address] [phone] Your Updated Medication List  
  
   
This list is accurate as of 4/17/18 10:27 AM.  Always use your most recent med list.  
  
  
  
  
 acetaminophen 650 mg Tber Commonly known as:  TYLENOL ARTHRITIS PAIN Take 1 Tab by mouth every eight (8) hours. amLODIPine 10 mg tablet Commonly known as:  Orval Deirdre Take 1 Tab by mouth daily. aspirin, buffered 81 mg Tab Take  by mouth daily. Taking twice weekly  
  
 atorvastatin 40 mg tablet Commonly known as:  LIPITOR Take 1 Tab by mouth daily. Replaces pravastatin  
  
 glucose blood VI test strips strip Commonly known as:  Ascensia CONTOUR Per insurance preference, test 1x/d dx diabetes E11.9 contour next test strips  
  
 metoprolol succinate 25 mg XL tablet Commonly known as:  TOPROL-XL Take 1 Tab by mouth daily. OCUVITE PRESERVISION PO Take 1 Tab by mouth daily. omega-3 acid ethyl esters 1 gram capsule Commonly known as:  Epimenio Willi TAKE 2 CAPSULES BY MOUTH EVERY DAY SITagliptin 100 mg tablet Commonly known as:  Sancho Ruths Take 1 Tab by mouth daily. Prescriptions Sent to Pharmacy Refills  
 acetaminophen (TYLENOL ARTHRITIS PAIN) 650 mg TbER 6 Sig: Take 1 Tab by mouth every eight (8) hours. Class: Normal  
 Pharmacy: [facility] [address], [address] #: [phone] Route: Oral  
  
We Performed the Following CK [id number] CPT(R)] FERRITIN [53032 CPT(R)] IRON PROFILE [id number] CPT(R)] PATHOLOGIST REVIEW SMEARS [[id number] Custom] RETICULOCYTE COUNT [id number] CPT(R)] URINALYSIS W/ RFLX MICROSCOPIC [66901 CPT(R)] Follow-up Instructions Return in about 3 months (around 7/17/2018). To-Do List   
 04/17/2018 Imaging:  XR ANKLE RT MIN 3 V   
  
 04/17/2018 Imaging:  XR CHEST PA LAT   
  
 04/17/2018 Imaging:  XR SPINE LUMB 2 OR 3 V Patient Instructions   
 
  
??????: ??????????? ?????????? - [ Anemia: Care Instructions ] ?????????? ?? ??????? ????? 
 
??????? ???????? ?????? ??????? ??????? ?????? ?????, ??????? ???????? ???????? ?? ????? ?????????. ?????????? ????? ?????? ??????. ????? ?? ????? ???????????????? ?????? ???????? ?????????? ??????. ?????? ????????? ????????? ?????? ??? ??????????? ???????????, ???????? ? ??????? ??????? ????? (???????????), ??????? ????????? ???????? ?? ?????? ? ??????? ?????????. ??? ???????????? ?????????? ?????? ???????? ???????????? ??????? ?????????? ??????????? ???????? ???????. ? ?????????? ?????? ?????? ????????? ?? ???????? ?????????? ????????? ? ?? ?????????? ????????? ? ????????. ????? ????, ? ??? ????? ???? ???????? ? ?????????????. 
????? ???????????????? ???????? ?????????? ?????? ???????? ????????????. ? ??? ????? ??????? ??????? ????????????? ???????????? ??? ????????????, ????????? ?????, ?????????, ????? ??? ?????? ????????????. ?????????? ????? ???????? ??? ?????? ????????????????????? ??????? (????????, ??????????) ????? ????? ???????? ???????????? ? ????????? ?????. ?????????? ?????? ? ??????? ????? ???????? ??????, ???????? ? ???????, ????? ????????? ????????? ?????? ??????, ???????? ?? ????? ????????????, ? ???????????? ? ???????????? ????????. 
??? ???? ????? ????????? ??? ????????? ??????. ??????? ????? ???????????? ????????? ???????, ?????? ??? ??????? ?????? ????????? ?????. ????? ????, ???? ????? ?????????? ??? ???? ????????, ??????? ???????, ???????? ???? ??? ????. 
?????? ????? ???? ??????? ? ??????? ?????????. ??? ?? ?????? ????????? ? ?????????? ?????????? ??????. ????????? ???????????? ??????? ????? ?????? ??????? ????? ????????? ?????????? ???????. 
??????????? ?????????? ? ????  ?????? ????? ?????? ??????? ? ????? ?????? ????????.??????????? ????????? ?????, ???? ??? ??? ???????? ?????. ??? ????????? ??????? ????????? ?????? ?????. ????????????? ????? ?????? ????????, ??????? ?? ??????????, ? ????? ?? ????? ?????????? ????? ????????. 
??? ???????? ? ???????? ????????? 
· ?????????? ????????? ?????? ???????? ??????????? ?????. ????????? ?????? ?????, ???? ????????, ??? ? ??? ????????? ???????? ??-?? ????????. 
· ???? ???? ??????????? ??? ????????? ?????? ? ?????????, ?????????? ?? ? ???????????? ? ?????????? ?????: 
¨ ???????????? ????????? ???????? ??????? ?? 1 ??? ?? ??? ????? 2 ???? ????? ?????? ????. ?????? ??? ????? ????????????? ????????? ?????? ?????? ? ????? ??? ???????????? ???????????? ???????. 
¨ ?? ?????????? ???????? ? ?? ????? ?????? ??? ??????? ? ???????? (????????, ????, ??? ??? ????) ? ?? ?? ????? ??? ? ??????? 2 ????? ????? ?????? ??????. ??? ????? ?????????? ?????????? ?????? ? ?????????. 
¨ ??????? ? (?? ???? ??? ??????? ???????) ???????? ?????? ??????????? ? ?????????. ???????????? ????????? ????????? ?????? ? ????????? ?????? ?? ???????? ????????????? ???? ??? ?????? ?????????, ??????? ????????? ?, ???????? ???????????. 
¨ ????????? ?????? ? ????????? ????? ???????? ???????????? ???????????, ???????? ??????, ???????, ?????, ????? ??? ??????. ?????????? ???????? ????? ? ???????? ? ?????????? ?????? ??????, ????? ? ?????????. ????????? ?????? ? ????????? ????? ?????????? ???? ? ?????? ??? ??????? ????. 
¨ ???? ?? ?????? ??????? ???????? ????????? ??????, ?? ?????????? ??????? ???? ??? ????????? ?????? ?????????. 
¨ ??????? ???????? ????????? ?????? ? ??????????? ??? ????? ?????. ????????????? ?????? ????? ???? ????? ???????. 
· ?????????? ???????? ????? ?? ???????????? ?????????, ??????? ???????. ? ??? ????????? ??????? ????, ???????? ? ????????????, ?????, ????, ????, ????, ?????????????? ???? ? ??????? ???????? ?????. 
· ???????? ????? ?? ????, ????? ??? ????????? ??????. 
????? ????? ?????????? ?? ???????? 
????????? 911, ??? ?????? ?? ???????, ??? ??? ????? ?????????? ??????. ????????, ??????? ? ????????? ???????. 
· ? ??? ????????? ???????? ???????? ????????. ? ????? ???? ????????? ????? ??????????: 
¨ ???? ??? ??????? ??????????? ? ?????, ? ????? ????????? ???????? ? ?????; 
¨ ?????????????; 
¨ ??????; 
¨ ??????? ??? ?????; 
¨ ????, ??????? ??????????? ??? ????????? ???????? ? ?????, ???, ?????? ???????, ? ??????? ????? ??????, ? ????? ??? ????? ??????, ? ????? ??? ????? ?????; 
¨ ?????????????? ??? ????????? ????????; 
¨ ????????? ???????????? ??? ???????? ???????? ? ????? ??????. 
???????? ?????? 911 ????? ??????????????? ??? ????????? 1 ???? ???????? ??? ???????? ??? 24 ???? ????????????? ????????. ????????? ??????? ?????? ??????. ?? ????????? ???? ????? ??????. 
· ?? ?????? ? ??????? (?????? ????????). 
?????????? ??????? ???????? ??????????? ?? ??????? ??????????? ???????: 
· ??? ????????? ??? ???????? ??????; 
· ???? ?? ?????????? ?????????????? ??? ??? ???????, ??? ?? ?????? ?????? ? ???????; 
· ??? ?????????? ??? ???????? ???????????? ? ????????; 
· ??? ????????? ????????? ????????????, ????????: 
¨ ???????????? ?? ????; 
¨ ???????????? ????????????, ??????? ?????????? ?? ?????????? (????? ???????, ?????? ??? ??????????? ? ?????? ????? ??????); 
¨ ????????? ??? ??????? ???? ??? ??????????? ????????????; 
¨ ??????????? ??? ??????? ????. 
??????????? ?????????? ?? ??????????? ?????? ????????? ? ??????????? ??????????? ? ???????? ?????, ????: 
· ??????? ????????? ?? ?? ?????????? ???? ?????. 
??? ????? ???????? ?????????????? ??????????? 
????????? [email or web address] ????? R301 ? ?????? ?????? ?????????????? ?????????? \"??????: ??????????? ?????????? - [ Anemia: Care Instructions ]. \" 
??????? ?????? ??: ??????? 13, 2016 
?????? ????????: 11.4 © [id number] [employer]. ??????????? ??????????, ?????????????? ? ???????????? ? ???????? ???????? ????? ??????. ???? ? ??? ????????? ??????? ? ?????-???? ??????????? ??? ?? ??????????, ?????????? ? ?????? ??????????, ??????? ?? ?????? ?????. ???????? [employer] ?? ???? ???????? ? ?? ????? ??????? ??????????????? ?? ??????????? ????????????? ?????? ??????????. Introducing [facility]! Sabina Starr introduces [employer] patient portal. Now you can access parts of your medical record, email your doctor's office, and request medication refills online. 1. In your internet browser, go to [email or web address] 2. Click on the First Time User? Click Here link in the Sign In box. You will see the New Member Sign Up page. 3. Enter your [employer] Access Code exactly as it appears below. You will not need to use this code after youve completed the sign-up process. If you do not sign up before the expiration date, you must request a new code. · [employer] Access Code: [email or web address] Expires: 7/9/2018  9:12 AM 
 
4. Enter the last four digits of your Social Security Number (xxxx) and Date of Birth (mm/dd/yyyy) as indicated and click Submit. You will be taken to the next sign-up page. 5. Create a [employer]et[employer] ID.  This will be your [employer] login ID and cannot be changed, so think of one that is secure and easy to remember. 6. Create a DreamDry password. You can change your password at any time. 7. Enter your Password Reset Question and Answer. This can be used at a later time if you forget your password. 8. Enter your e-mail address. You will receive e-mail notification when new information is available in [address] 9. Click Sign Up. You can now view and download portions of your medical record. 10. Click the Download Summary menu link to download a portable copy of your medical information. If you have questions, please visit the Frequently Asked Questions section of the DreamD[employer] website. Remember, [employer] is NOT to be used for urgent needs. For medical emergencies, dial 911. Now available from your iPhone and Android! Please provide this summary of care documentation to your next provider. Your primary care clinician is listed as Eri Vázquez. If you have any questions after today's visit, please call [phone].

## 2018-04-18 ENCOUNTER — TELEPHONE (OUTPATIENT)
Dept: FAMILY MEDICINE CLINIC | Age: 78
End: 2018-04-18

## 2018-04-18 DIAGNOSIS — D50.0 IRON DEFICIENCY ANEMIA DUE TO CHRONIC BLOOD LOSS: Primary | ICD-10-CM

## 2018-04-18 DIAGNOSIS — Z87.19 HISTORY OF GASTRITIS: ICD-10-CM

## 2018-04-18 PROBLEM — R19.5 HEME POSITIVE STOOL: Status: ACTIVE | Noted: 2018-04-18

## 2018-04-18 LAB
CK SERPL-CCNC: 98 U/L (ref 24–204)
FERRITIN SERPL-MCNC: 18 NG/ML (ref 30–400)
IRON SATN MFR SERPL: 7 % (ref 15–55)
IRON SERPL-MCNC: 25 UG/DL (ref 38–169)
RETICS/RBC NFR AUTO: 2.4 % (ref 0.6–2.6)
TIBC SERPL-MCNC: 376 UG/DL (ref 250–450)
UIBC SERPL-MCNC: 351 UG/DL (ref 111–343)

## 2018-04-18 RX ORDER — FERROUS SULFATE 325(65) MG
325 TABLET, DELAYED RELEASE (ENTERIC COATED) ORAL 2 TIMES DAILY WITH MEALS
Qty: 180 TAB | Refills: 0 | Status: SHIPPED | OUTPATIENT
Start: 2018-04-18 | End: 2018-04-23

## 2018-04-18 RX ORDER — DOCUSATE SODIUM 100 MG/1
100 CAPSULE, LIQUID FILLED ORAL 2 TIMES DAILY
Qty: 180 CAP | Refills: 0 | Status: SHIPPED | OUTPATIENT
Start: 2018-04-18 | End: 2018-05-24

## 2018-04-18 NOTE — PROGRESS NOTES
Please let wife know (he doesn't speak much [place]) that xrays don't show much but mild arthritis and most labs are normal.  However, his iron levels are very low, and that might make his pain worse. I am going to send in iron pills for him to take and refer him to a GI doctor for upper endoscopy to see if his gastritis is back and bleeding again. I should see him back for recheck in 4-6 weeks. Thanks!  Obie Huggins MD 4/18/2018 1:06 PM

## 2018-04-18 NOTE — PROGRESS NOTES
Sent in letter:  Labs were normal except for low iron levels. I am going to call in iron pills for you to take twice daily (you might need a stool softener with them, as they often cause constipation). I am going to refer you to a GI doctor Sharron Treviño) for upper endoscopy, as I worry your gastritis might be back. I hope that replacing your iron stores will help your pain. I should see you back in 4-6 weeks.

## 2018-04-18 NOTE — TELEPHONE ENCOUNTER
Pt's spouse returning call  in ref to getting results for xray and other tests done on her    Best # [phone]

## 2018-04-19 NOTE — TELEPHONE ENCOUNTER
----- Message from Radha Justin MD sent at 4/18/2018  1:06 PM EDT -----  Please let wife know (he doesn't speak much English) that xrays don't show much but mild arthritis and most labs are normal.  However, his iron levels are very low, and that might make his pain worse. I am going to send in iron pills for him to take and refer him to a GI doctor for upper endoscopy to see if his gastritis is back and bleeding again. I should see him back for recheck in 4-6 weeks. Thanks!  Radha Justin MD 4/18/2018 1:06 PM     Lisa Gregg over results with spouse. Kalen Tineo states that pt is scheduled with Dr Itzel Andrade on 5/3.  I have booked pt for 5/24 with Dr Wilfrido Cantu for f/u. Micah George will fax last OV note and labs to Dr Itzel Andrade.

## 2018-04-19 NOTE — PROGRESS NOTES
Went over results with spouse. She states that pt is scheduled with Dr Padilla Suarez on 5/3. I have booked pt for 5/24 with Dr Giorgi Lopez for f/u.   I will fax last OV note and labs to Dr Padilla Suarez.

## 2018-04-20 LAB
BASOPHILS # BLD AUTO: 0 X10E3/UL (ref 0–0.2)
BASOPHILS NFR BLD AUTO: 0 %
EOSINOPHIL # BLD AUTO: 0.2 X10E3/UL (ref 0–0.4)
EOSINOPHIL NFR BLD AUTO: 2 %
ERYTHROCYTE [DISTWIDTH] IN BLOOD BY AUTOMATED COUNT: 15.8 % (ref 12.3–15.4)
HCT VFR BLD AUTO: 33.7 % (ref 37.5–51)
HGB BLD-MCNC: 10.7 G/DL (ref 13–17.7)
IMM GRANULOCYTES # BLD: 0 X10E3/UL (ref 0–0.1)
IMM GRANULOCYTES NFR BLD: 0 %
LYMPHOCYTES # BLD AUTO: 1.4 X10E3/UL (ref 0.7–3.1)
LYMPHOCYTES NFR BLD AUTO: 17 %
MCH RBC QN AUTO: 23.5 PG (ref 26.6–33)
MCHC RBC AUTO-ENTMCNC: 31.8 G/DL (ref 31.5–35.7)
MCV RBC AUTO: 74 FL (ref 79–97)
MONOCYTES # BLD AUTO: 0.7 X10E3/UL (ref 0.1–0.9)
MONOCYTES NFR BLD AUTO: 9 %
NEUTROPHILS # BLD AUTO: 6.1 X10E3/UL (ref 1.4–7)
NEUTROPHILS NFR BLD AUTO: 72 %
PATH REV BLD -IMP: ABNORMAL
PATHOLOGIST NAME: ABNORMAL
PLATELET # BLD AUTO: 202 X10E3/UL (ref 150–379)
RBC # BLD AUTO: 4.55 X10E6/UL (ref 4.14–5.8)
WBC # BLD AUTO: 8.4 X10E3/UL (ref 3.4–10.8)

## 2018-04-23 ENCOUNTER — TELEPHONE (OUTPATIENT)
Dept: FAMILY MEDICINE CLINIC | Age: 78
End: 2018-04-23

## 2018-04-23 NOTE — TELEPHONE ENCOUNTER
Called Kirk back and he said that EC iron is $70. If we change to the regular iron the 90 day will cost ~$15.   OTC iron will be ~ $10 for a 30 day supply

## 2018-04-23 NOTE — TELEPHONE ENCOUNTER
Pharmacist Denis Torres from [facility] is calling on behalf of patient in regards to medication change request that was faxed over on 4/18/18. He is requesting a call back in regards to this matter.     Best call back [phone]  Fax [phone]

## 2018-04-24 ENCOUNTER — TELEPHONE (OUTPATIENT)
Dept: FAMILY MEDICINE CLINIC | Age: 78
End: 2018-04-24

## 2018-04-24 NOTE — TELEPHONE ENCOUNTER
MALIA Poole 267 calling on behalf of patient in regards to medication change. Medication is. He states that the medication is more ferrous sulfate 325 mg (65 mg iron) cpER.  He is wanting to know [facility]    Best call back [phone]  Fax [phone]

## 2018-04-25 NOTE — TELEPHONE ENCOUNTER
Called Kirk to let him know that we were trying to order the immediate release but didn't have that option. Gave verbal order per Dr Claudia Bledsoe.

## 2018-05-02 ENCOUNTER — OFFICE VISIT (OUTPATIENT)
Dept: FAMILY MEDICINE CLINIC | Age: 78
End: 2018-05-02

## 2018-05-02 VITALS
HEART RATE: 64 BPM | TEMPERATURE: 97.4 F | WEIGHT: 191.6 LBS | SYSTOLIC BLOOD PRESSURE: 118 MMHG | BODY MASS INDEX: 32.71 KG/M2 | HEIGHT: 64 IN | DIASTOLIC BLOOD PRESSURE: 68 MMHG | RESPIRATION RATE: 18 BRPM | OXYGEN SATURATION: 98 %

## 2018-05-02 DIAGNOSIS — J02.9 PHARYNGITIS, UNSPECIFIED ETIOLOGY: Primary | ICD-10-CM

## 2018-05-02 NOTE — PROGRESS NOTES
Daniajose Irizarry [facility] Note      Subjective:     Chief Complaint   Patient presents with    Sore Throat     sharp throat pain at night.  Nasal Congestion     nasal congestion with post nasal drip. Amber Araiza is a 69y.o. year old male who presents for evaluation of the following:    Sore Throat:   Onset 5 days ago  Cough with mucous  Tx: tea, vitamin C  Anuj ear pain, sweats, fever, body ache, known allergies, chest pain or problems breathing, smoking history    Asks if there was any cancer in this throat    Wife, Kristina Hughes, serves an . Review of Systems   Pertinent positives and negative per HPI. All other systems  reviewed are negative for a Comprehensive ROS (10+). Past Medical History:   Diagnosis Date    Diabetes ([address]     type 2; niddm    Hypertension     Ill-defined condition     HIGH CHOLESTEROL        Social History     Social History    Marital status:      Spouse name: N/A    Number of children: N/A    Years of education: N/A     Occupational History    Not on file. Social History Main Topics    Smoking status: Former Smoker     Packs/day: 0.75     Years: 25.00     Quit date: 1/1/2004    Smokeless tobacco: Never Used    Alcohol use No    Drug use: No    Sexual activity: Not on file     Other Topics Concern    Not on file     Social History Narrative        2 kids and 4 grandkids live closeby    Originally from [facility]       Current Outpatient Prescriptions   Medication Sig    ferrous sulfate 325 mg (65 mg iron) cpER Take 1 Cap by mouth two (2) times a day.  docusate sodium (COLACE) 100 mg capsule Take 1 Cap by mouth two (2) times a day for 90 days. With iron    VIT A/VIT C/VIT E/ZINC/COPPER (OCUVITE PRESERVISION PO) Take 1 Tab by mouth daily.  acetaminophen (TYLENOL ARTHRITIS PAIN) 650 mg TbER Take 1 Tab by mouth every eight (8) hours.     glucose blood VI test strips (ASCENSIA CONTOUR) strip Per insurance preference, test 1x/d dx diabetes E11.9 contour next test strips    atorvastatin (LIPITOR) 40 mg tablet Take 1 Tab by mouth daily. Replaces pravastatin    amLODIPine (NORVASC) 10 mg tablet Take 1 Tab by mouth daily.  SITagliptin (JANUVIA) 100 mg tablet Take 1 Tab by mouth daily.  metoprolol succinate (TOPROL-XL) 25 mg XL tablet Take 1 Tab by mouth daily.  omega-3 acid ethyl esters (LOVAZA) 1 gram capsule TAKE 2 CAPSULES BY MOUTH EVERY DAY    Aspirin, Buffered 81 mg tab Take  by mouth daily. Taking twice weekly     No current facility-administered medications for this visit. Objective:     Vitals:    05/02/18 1724   BP: 118/68   Pulse: 64   Resp: 18   Temp: 97.4 °F (36.3 °C)   TempSrc: Oral   SpO2: 98%   Weight: 191 lb 9.6 oz (86.9 kg)   Height: 5' 4\" (1.626 m)       Physical Examination:  General: Alert, cooperative, no distress, appears stated age. Eyes: Conjunctivae/corneas clear. PERRL, EOMs intact. Ears: Normal external ear canals both ears. TM clear and mobile bilaterally  Nose: Nares normal. Septum midline. Mucosa normal. No drainage or sinus tenderness. Mouth/Throat: Lips, mucosa, and tongue normal. Teeth and gums normal.  Mild oropharyngeal erythema. Mild clear posterior pharyngeal drainage. No tonsillar enlargement or exudate  Neck: Supple, symmetrical, trachea midline, no adenopathy. No thyroid enlargement/tenderness/nodules  Lungs: Clear to auscultation bilaterally. Normal inspiratory and expiratory ratio. Heart: Regular rate and rhythm, S1, S2 normal, no murmur, click, rub or gallop. Abdomen: Soft, non-tender. Extremities: Extremities normal, atraumatic, no cyanosis or edema. Pulses: 2+ and symmetric all extremities. Skin: Skin color, texture, turgor normal. No rashes or lesions on exposed skin.   Lymph nodes: Cervical, supraclavicular nodes normal.       Office Visit on 04/17/2018   Component Date Value Ref Range Status    Creatine Kinase,Total 04/17/2018 98  24 - 204 U/L Final    WBC 04/17/2018 Appear normal.   Final    RBC 04/17/2018 Comment   Final    Comment: Hypochromic, microcytic anemia with increased RDW, rule out  iron deficiency.  Platelets 58/75/0100 Appear normal.   Final    Pathologist 04/17/2018 Comment   Final    Reviewed by:  Rios Morton MD, Pathologist    WBC 04/17/2018 8.4  3.4 - 10.8 x10E3/uL Final    RBC 04/17/2018 4.55  4.14 - 5.80 x10E6/uL Final    HGB 04/17/2018 10.7* 13.0 - 17.7 g/dL Final    HCT 04/17/2018 33.7* 37.5 - 51.0 % Final    MCV 04/17/2018 74* 79 - 97 fL Final    MCH 04/17/2018 23.5* 26.6 - 33.0 pg Final    MCHC 04/17/2018 31.8  31.5 - 35.7 g/dL Final    RDW 04/17/2018 15.8* 12.3 - 15.4 % Final    PLATELET 30/20/5578 002  150 - 379 x10E3/uL Final    NEUTROPHILS 04/17/2018 72  Not Estab. % Final    Lymphocytes 04/17/2018 17  Not Estab. % Final    MONOCYTES 04/17/2018 9  Not Estab. % Final    EOSINOPHILS 04/17/2018 2  Not Estab. % Final    BASOPHILS 04/17/2018 0  Not Estab. % Final    ABS. NEUTROPHILS 04/17/2018 6.1  1.4 - 7.0 x10E3/uL Final    Abs Lymphocytes 04/17/2018 1.4  0.7 - 3.1 x10E3/uL Final    ABS. MONOCYTES 04/17/2018 0.7  0.1 - 0.9 x10E3/uL Final    ABS. EOSINOPHILS 04/17/2018 0.2  0.0 - 0.4 x10E3/uL Final    ABS. BASOPHILS 04/17/2018 0.0  0.0 - 0.2 x10E3/uL Final    IMMATURE GRANULOCYTES 04/17/2018 0  Not Estab. % Final    ABS. IMM.  GRANS. 04/17/2018 0.0  0.0 - 0.1 x10E3/uL Final    TIBC 04/17/2018 376  250 - 450 ug/dL Final    UIBC 04/17/2018 351* 111 - 343 ug/dL Final    Iron 04/17/2018 25* 38 - 169 ug/dL Final    Iron % saturation 04/17/2018 7* 15 - 55 % Final    Ferritin 04/17/2018 18* 30 - 400 ng/mL Final    Specific Gravity 04/17/2018 1.020  1.005 - 1.030 Final    pH (UA) 04/17/2018 5.0  5.0 - 7.5 Final    Color 04/17/2018 Yellow  Yellow Final    Appearance 04/17/2018 Clear  Clear Final    Leukocyte Esterase 04/17/2018 Negative  Negative Final    Protein 04/17/2018 Negative Negative/Trace Final    Glucose 04/17/2018 Negative  Negative Final    Ketone 04/17/2018 Negative  Negative Final    Blood 04/17/2018 Negative  Negative Final    Bilirubin 04/17/2018 Negative  Negative Final    Urobilinogen 04/17/2018 0.2  0.2 - 1.0 mg/dL Final    Nitrites 04/17/2018 Negative  Negative Final    Microscopic Examination 04/17/2018 Comment   Final    Microscopic not indicated and not performed.     Reticulocyte count 04/17/2018 2.4  0.6 - 2.6 % Final   Office Visit on 04/10/2018   Component Date Value Ref Range Status    Hemoglobin A1c 04/10/2018 6.5* 4.8 - 5.6 % Final    Comment:          Pre-diabetes: 5.7 - 6.4           Diabetes: >6.4           Glycemic control for adults with diabetes: <7.0      Estimated average glucose 04/10/2018 140  mg/dL Final    Glucose 04/10/2018 127* 65 - 99 mg/dL Final    BUN 04/10/2018 19  8 - 27 mg/dL Final    Creatinine 04/10/2018 1.10  0.76 - 1.27 mg/dL Final    GFR est non-AA 04/10/2018 64  >59 mL/min/1.73 Final    GFR est AA 04/10/2018 74  >59 mL/min/1.73 Final    BUN/Creatinine ratio 04/10/2018 17  10 - 24 Final    Sodium 04/10/2018 143  134 - 144 mmol/L Final    Potassium 04/10/2018 4.1  3.5 - 5.2 mmol/L Final    Chloride 04/10/2018 103  96 - 106 mmol/L Final    CO2 04/10/2018 26  18 - 29 mmol/L Final    Calcium 04/10/2018 9.7  8.6 - 10.2 mg/dL Final    Uric acid 04/10/2018 5.4  3.7 - 8.6 mg/dL Final               Therapeutic target for gout patients: <6.0    Rheumatoid factor 04/10/2018 <10.0  0.0 - 13.9 IU/mL Final    Antinuclear Antibodies Direct 04/10/2018 Negative  Negative Final    Sed rate (ESR) 04/10/2018 33* 0 - 30 mm/hr Final    C-Reactive Protein, Qt 04/10/2018 12.3* 0.0 - 4.9 mg/L Final    WBC 04/10/2018 8.6  3.4 - 10.8 x10E3/uL Final    RBC 04/10/2018 4.48  4.14 - 5.80 x10E6/uL Final    HGB 04/10/2018 10.5* 13.0 - 17.7 g/dL Final    HCT 04/10/2018 34.3* 37.5 - 51.0 % Final    MCV 04/10/2018 77* 79 - 97 fL Final   [facility] 04/10/2018 23.4* 26.6 - 33.0 pg Final    MCHC 04/10/2018 30.6* 31.5 - 35.7 g/dL Final    RDW 04/10/2018 15.8* 12.3 - 15.4 % Final    PLATELET 32/93/8469 655  150 - 379 x10E3/uL Final    NEUTROPHILS 04/10/2018 70  Not Estab. % Final    Lymphocytes 04/10/2018 17  Not Estab. % Final    MONOCYTES 04/10/2018 10  Not Estab. % Final    EOSINOPHILS 04/10/2018 2  Not Estab. % Final    BASOPHILS 04/10/2018 0  Not Estab. % Final    ABS. NEUTROPHILS 04/10/2018 6.2  1.4 - 7.0 x10E3/uL Final    Abs Lymphocytes 04/10/2018 1.5  0.7 - 3.1 x10E3/uL Final    ABS. MONOCYTES 04/10/2018 0.8  0.1 - 0.9 x10E3/uL Final    ABS. EOSINOPHILS 04/10/2018 0.1  0.0 - 0.4 x10E3/uL Final    ABS. BASOPHILS 04/10/2018 0.0  0.0 - 0.2 x10E3/uL Final    IMMATURE GRANULOCYTES 04/10/2018 1  Not Estab. % Final    ABS. IMM. GRANS. 04/10/2018 0.0  0.0 - 0.1 x10E3/uL Final           Assessment/ Plan:   Diagnoses and all orders for this visit:    1. Pharyngitis, unspecified etiology    Mild. Likely allergic vs vital etiology. No SIRS. Trial otc meds for symptom relief discussed and listed in patient instructions- mucinex + antihistamine + sinus rinse + NSAID + humidifier prn      I have discussed the diagnosis with the patient and the intended plan as seen in the above orders. The patient has received an after-visit summary and questions were answered concerning future plans. I have discussed medication side effects and warnings with the patient as well. Follow-up Disposition:  Return if symptoms worsen or fail to improve, for Follow Up.       Signed,    Enriqueta Cazares MD  5/2/2018

## 2018-05-02 NOTE — MR AVS SNAPSHOT
[facility] 
 
 
 [address] [place] [address] 
[phone] [facility]: [facility] MRN: [id number] Visit Information Date & Time Provider Department Dept. Phone Encounter #  
 2018  5:45 PM Lulu Rodgers  [facility] [phone] [id number] Follow-up Instructions Return if symptoms worsen or fail to improve, for Follow Up. Your Appointments 2018  5:45 PM  
SAME DAY with Lulu Rodgers MD  
[facility]) Appt Note: pain, throwing up 0cp/0pb arh 18  
 [address] [facility] [address]  
[phone]  
  
   
 [phone]  
  
    
 2018  8:15 AM  
ROUTINE CARE with Yin Boles MD  
[address] [place] [address]) Appt Note: f/u per Dr Felix Simon [address] [facility] [address]  
[phone]  
  
   
 [phone]  
  
    
 7/10/2018  8:45 AM  
ROUTINE CARE with Yin Boles MD  
[address] [place] [address]) Appt Note: 3 months f/u appt  
 [address] [place] [address]  
[phone]  
  
    
 10/9/2018  8:45 AM  
ROUTINE CARE with Yin Boles MD  
[facility]) Appt Note: 6 months f/u appt  
 [address] [place] [address]  
[phone] Upcoming Health Maintenance Date Due  
 EYE EXAM RETINAL OR DILATED Q1 1950 ZOSTER VACCINE AGE 60> 2000 GLAUCOMA SCREENING Q2Y 2006 Pneumococcal 65+ High/Highest Risk (1 of 2 - PCV13) 2006 Influenza Age 5 to Adult 2018 HEMOGLOBIN A1C Q6M 10/10/2018 MICROALBUMIN Q1 2019 LIPID PANEL Q1 2019 FOOT EXAM Q1 4/10/2019 MEDICARE YEARLY EXAM 2019 DTaP/Tdap/Td series (2 - Td) 2025 Allergies as of 5/2/2018  Review Complete On: 5/2/2018 By: Magda Breaux LPN No Known Allergies Current Immunizations  Reviewed on 12/26/2017 Name Date Td 9/30/1999, 3/29/1999, 1/28/1999 Tdap 8/6/2015 Not reviewed this visit You Were Diagnosed With   
  
 Codes Comments Pharyngitis, unspecified etiology    -  Primary ICD-10-CM: J02.9 ICD-9-CM: 852 Vitals BP Pulse Temp Resp Height(growth percentile) Weight(growth percentile)  
 118/68 (BP 1 Location: Right arm, BP Patient Position: Sitting) 64 97.4 °F (36.3 °C) (Oral) 18 5' 4\" (1.626 m) 191 lb 9.6 oz (86.9 kg) SpO2 BMI Smoking Status 98% 32.89 kg/m2 Former Smoker BMI and BSA Data Body Mass Index Body Surface Area  
 32.89 kg/m 2 1.98 m 2 Preferred Pharmacy Pharmacy Name Phone [facility] [address] [place] [address] [phone] Your Updated Medication List  
  
   
This list is accurate as of 5/2/18  5:36 PM.  Always use your most recent med list.  
  
  
  
  
 acetaminophen 650 mg Tber Commonly known as:  TYLENOL ARTHRITIS PAIN Take 1 Tab by mouth every eight (8) hours. amLODIPine 10 mg tablet Commonly known as:  [place] Bars Take 1 Tab by mouth daily. aspirin, buffered 81 mg Tab Take  by mouth daily. Taking twice weekly  
  
 atorvastatin 40 mg tablet Commonly known as:  LIPITOR Take 1 Tab by mouth daily. Replaces pravastatin  
  
 docusate sodium 100 mg capsule Commonly known as:  Vernard Brenton Take 1 Cap by mouth two (2) times a day for 90 days. With iron  
  
 ferrous sulfate 325 mg (65 mg iron) Cper Take 1 Cap by mouth two (2) times a day. glucose blood VI test strips strip Commonly known as:  Ascensia CONTOUR Per insurance preference, test 1x/d dx diabetes E11.9 contour next test strips  
  
 metoprolol succinate 25 mg XL tablet Commonly known as:  TOPROL-XL  
 Take 1 Tab by mouth daily. OCUVITE PRESERVISION PO Take 1 Tab by mouth daily. omega-3 acid ethyl esters 1 gram capsule Commonly known as:  Marcy Baker TAKE 2 CAPSULES BY MOUTH EVERY DAY SITagliptin 100 mg tablet Commonly known as:  Maegan Kongin Take 1 Tab by mouth daily. Follow-up Instructions Return if symptoms worsen or fail to improve, for Follow Up. Patient Instructions For your symptoms: Your symptoms may improve with an oral antihistamine. These are available over the counter and include: 
Loratadine/claritin Cetirizine/Zyrtec Fexofenadine/Allegra Levocetirizine/Xyzal 
 
· Your symptoms may improve with a nasal steroid. These are available over the counter and include: · Flonase (aka fluticasone) · Nasocort (aka triamcinolone) · Nasonex (aka mometasone) · Rhinocort (aka budesonide) · Increase fluid intake, especially water to thin mucous and boost the immune system. · Avoid sugar and dairy while congested since they thicken mucous. · Get plenty of rest!   
· Gargle 3 times daily and as needed in Listerine or warm salt water vinegar solutions (1 tsp salt, 1 tsp vinegar in 1 cup lukewarm water.) · Use OTC nasal saline spray up each nostril four times daily. You could also consider using a netipot with distilled water. · Use humidifier at bedtime. · Use OTC Mucinex 600 mg twice daily to loosen mucous. · Use OTC Tylenol (up to 650mg every 6 hours) or Ibuprofen (up to 800 mg every 8 hours) as needed for pain, fever or headaches. Return to the doctor for evaluation: · If mucous is consistently discolored yellow or green throughout the day for more than a week · If you develop worsening facial pain · If you develop a fever that will not go away · If your symptoms worsen instead of improve Sore Throat: Care Instructions Your Care Instructions Infection by bacteria or a virus causes most sore throats.  Cigarette smoke, dry air, air pollution, allergies, and yelling can also cause a sore throat. Sore throats can be painful and annoying. Fortunately, most sore throats go away on their own. If you have a bacterial infection, your doctor may prescribe antibiotics. Follow-up care is a key part of your treatment and safety. Be sure to make and go to all appointments, and call your doctor if you are having problems. It's also a good idea to know your test results and keep a list of the medicines you take. How can you care for yourself at home? · If your doctor prescribed antibiotics, take them as directed. Do not stop taking them just because you feel better. You need to take the full course of antibiotics. · Gargle with warm salt water once an hour to help reduce swelling and relieve discomfort. Use 1 teaspoon of salt mixed in 1 cup of warm water. · Take an over-the-counter pain medicine, such as acetaminophen (Tylenol), ibuprofen (Advil, Motrin), or naproxen (Aleve). Read and follow all instructions on the label. · Be careful when taking over-the-counter cold or flu medicines and Tylenol at the same time. Many of these medicines have acetaminophen, which is Tylenol. Read the labels to make sure that you are not taking more than the recommended dose. Too much acetaminophen (Tylenol) can be harmful. · Drink plenty of fluids. Fluids may help soothe an irritated throat. Hot fluids, such as tea or soup, may help decrease throat pain. · Use over-the-counter throat lozenges to soothe pain. Regular cough drops or hard candy may also help. These should not be given to young children because of the risk of choking. · Do not smoke or allow others to smoke around you. If you need help quitting, talk to your doctor about stop-smoking programs and medicines. These can increase your chances of quitting for good. · Use a vaporizer or humidifier to add moisture to your bedroom. Follow the directions for cleaning the machine. When should you call for help? Call your doctor now or seek immediate medical care if: 
? · You have new or worse trouble swallowing. ? · Your sore throat gets much worse on one side. ? Watch closely for changes in your health, and be sure to contact your doctor if you do not get better as expected. Where can you learn more? Go to http://mack-meeta.info/. Enter [phone] in the search box to learn more about \"Sore Throat: Care Instructions. \" Current as of: May 12, 2017 Content Version: 11.4 © 4621-8467 [employer]Â®. Care instructions adapted under license by [employer] (which disclaims liability or warranty for this information). If you have questions about a medical condition or this instruction, always ask your healthcare professional. [address] any warranty or liability for your use of this information. Introducing [facility]! Pito Miner introduces PopJam patient portal. Now you can access parts of your medical record, email your doctor's office, and request medication refills online. 1. In your internet browser, go to [email or web address] 2. Click on the First Time User? Click Here link in the Sign In box. You will see the New Member Sign Up page. 3. Enter your PopJam Access Code exactly as it appears below. You will not need to use this code after youve completed the sign-up process. If you do not sign up before the expiration date, you must request a new code. · PopJam Access Code: [email or web address] Expires: 7/9/2018  9:12 AM 
 
4. Enter the last four digits of your Social Security Number (xxxx) and Date of Birth (mm/dd/yyyy) as indicated and click Submit. You will be taken to the next sign-up page. 5. Create a PopJam ID. This will be your PopJam login ID and cannot be changed, so think of one that is secure and easy to remember. 6. Create a [employer] password. You can change your password at any time. 7. Enter your Password Reset Question and Answer. This can be used at a later time if you forget your password. 8. Enter your e-mail address. You will receive e-mail notification when new information is available in [address] 9. Click Sign Up. You can now view and download portions of your medical record. 10. Click the Download Summary menu link to download a portable copy of your medical information. If you have questions, please visit the Frequently Asked Questions section of the [employer] website. Remember, [employer] is NOT to be used for urgent needs. For medical emergencies, dial 911. Now available from your iPhone and Android! Please provide this summary of care documentation to your next provider. Your primary care clinician is listed as Mary Ann Olivia. If you have any questions after today's visit, please call [phone].

## 2018-05-02 NOTE — PROGRESS NOTES
Chief Complaint   Patient presents with    Sore Throat     sharp throat pain at night.  Nasal Congestion     nasal congestion with post nasal drip. 1. Have you been to the ER, urgent care clinic since your last visit? Hospitalized since your last visit? No    2. Have you seen or consulted any other health care providers outside of the [address] [place] [address] since your last visit? Include any pap smears or colon screening.  No

## 2018-05-02 NOTE — PATIENT INSTRUCTIONS
For your symptoms: Your symptoms may improve with an oral antihistamine. These are available over the counter and include:  Loratadine/claritin  Cetirizine/Zyrtec  Fexofenadine/Allegra  Levocetirizine/Xyzal    · Your symptoms may improve with a nasal steroid. These are available over the counter and include:  · Flonase (aka fluticasone)  · Nasocort (aka triamcinolone)  · Nasonex (aka mometasone)  · Rhinocort (aka budesonide)    · Increase fluid intake, especially water to thin mucous and boost the immune system. · Avoid sugar and dairy while congested since they thicken mucous. · Get plenty of rest!    · Gargle 3 times daily and as needed in Listerine or warm salt water vinegar solutions (1 tsp salt, 1 tsp vinegar in 1 cup lukewarm water.)    · Use OTC nasal saline spray up each nostril four times daily. You could also consider using a netipot with distilled water. · Use humidifier at bedtime. · Use OTC Mucinex 600 mg twice daily to loosen mucous. · Use OTC Tylenol (up to 650mg every 6 hours) or Ibuprofen (up to 800 mg every 8 hours) as needed for pain, fever or headaches. Return to the doctor for evaluation:  · If mucous is consistently discolored yellow or green throughout the day for more than a week  · If you develop worsening facial pain  · If you develop a fever that will not go away  · If your symptoms worsen instead of improve           Sore Throat: Care Instructions  Your Care Instructions    Infection by bacteria or a virus causes most sore throats. Cigarette smoke, dry air, air pollution, allergies, and yelling can also cause a sore throat. Sore throats can be painful and annoying. Fortunately, most sore throats go away on their own. If you have a bacterial infection, your doctor may prescribe antibiotics. Follow-up care is a key part of your treatment and safety. Be sure to make and go to all appointments, and call your doctor if you are having problems.  It's also a good idea to know your test results and keep a list of the medicines you take. How can you care for yourself at home? · If your doctor prescribed antibiotics, take them as directed. Do not stop taking them just because you feel better. You need to take the full course of antibiotics. · Gargle with warm salt water once an hour to help reduce swelling and relieve discomfort. Use 1 teaspoon of salt mixed in 1 cup of warm water. · Take an over-the-counter pain medicine, such as acetaminophen (Tylenol), ibuprofen (Advil, Motrin), or naproxen (Aleve). Read and follow all instructions on the label. · Be careful when taking over-the-counter cold or flu medicines and Tylenol at the same time. Many of these medicines have acetaminophen, which is Tylenol. Read the labels to make sure that you are not taking more than the recommended dose. Too much acetaminophen (Tylenol) can be harmful. · Drink plenty of fluids. Fluids may help soothe an irritated throat. Hot fluids, such as tea or soup, may help decrease throat pain. · Use over-the-counter throat lozenges to soothe pain. Regular cough drops or hard candy may also help. These should not be given to young children because of the risk of choking. · Do not smoke or allow others to smoke around you. If you need help quitting, talk to your doctor about stop-smoking programs and medicines. These can increase your chances of quitting for good. · Use a vaporizer or humidifier to add moisture to your bedroom. Follow the directions for cleaning the machine. When should you call for help? Call your doctor now or seek immediate medical care if:  ? · You have new or worse trouble swallowing. ? · Your sore throat gets much worse on one side. ? Watch closely for changes in your health, and be sure to contact your doctor if you do not get better as expected. Where can you learn more? Go to [email or web address]   Enter J522 in the search box to learn more about \"Sore Throat: Care Instructions. \"  Current as of: May 12, 2017  Content Version: 11.4  © 4330-3230 [employer]. Care instructions adapted under license by [employer] (which disclaims liability or warranty for this information). If you have questions about a medical condition or this instruction, always ask your healthcare professional. [place] [id number] any warranty or liability for your use of this information.

## 2018-05-24 ENCOUNTER — HOSPITAL ENCOUNTER (OUTPATIENT)
Dept: LAB | Age: 78
Discharge: HOME | End: 2018-05-24
Payer: MEDICARE

## 2018-05-24 ENCOUNTER — OFFICE VISIT (OUTPATIENT)
Dept: FAMILY MEDICINE CLINIC | Age: 78
End: 2018-05-24

## 2018-05-24 VITALS
BODY MASS INDEX: 32.78 KG/M2 | HEART RATE: 57 BPM | TEMPERATURE: 98.4 F | OXYGEN SATURATION: 93 % | WEIGHT: 192 LBS | HEIGHT: 64 IN | RESPIRATION RATE: 18 BRPM | SYSTOLIC BLOOD PRESSURE: 138 MMHG | DIASTOLIC BLOOD PRESSURE: 76 MMHG

## 2018-05-24 DIAGNOSIS — E11.21 TYPE 2 DIABETES MELLITUS WITH NEPHROPATHY (HCC): ICD-10-CM

## 2018-05-24 DIAGNOSIS — M79.671 FOOT PAIN, BILATERAL: ICD-10-CM

## 2018-05-24 DIAGNOSIS — E11.9 CONTROLLED TYPE 2 DIABETES MELLITUS WITHOUT COMPLICATION, WITHOUT LONG-TERM CURRENT USE OF INSULIN (HCC): ICD-10-CM

## 2018-05-24 DIAGNOSIS — D50.0 IRON DEFICIENCY ANEMIA DUE TO CHRONIC BLOOD LOSS: Primary | ICD-10-CM

## 2018-05-24 DIAGNOSIS — I10 ESSENTIAL HYPERTENSION: ICD-10-CM

## 2018-05-24 DIAGNOSIS — M79.672 FOOT PAIN, BILATERAL: ICD-10-CM

## 2018-05-24 PROCEDURE — 83036 HEMOGLOBIN GLYCOSYLATED A1C: CPT

## 2018-05-24 PROCEDURE — 83550 IRON BINDING TEST: CPT

## 2018-05-24 PROCEDURE — 80048 BASIC METABOLIC PNL TOTAL CA: CPT

## 2018-05-24 PROCEDURE — 85025 COMPLETE CBC W/AUTO DIFF WBC: CPT

## 2018-05-24 NOTE — PATIENT INSTRUCTIONS
????? ? ?????? ??????????? ?????? (2000 ????????????): ??????????? ?????????? - [ Low Sodium Diet (2,000 Milligram): Care Instructions ]  ?????????? ?? ??????? ?????  ??????? ??????? ?????????? ?????? ???????? ???????? ??????????? ?????????? ???????? ? ?????????. ??? ???? ????? ?????????? ???????????? ????????, ? ?????? ? ?????? ?????????? ???????? ??????. ? ????? ????????? ??????? ??? ????? ???????? ? ??????????????. ???????? ???? ?????? ?????. ??????????? ??????????? ??????, ?? ?????? ??????????? ???? ????? ? ??????? ???? ???????? ????????? ?????????.  ???????? ???????????????? ?????????? ?????? ???????? ?????????? ????. ???? ???????? ??????? ????? ???? ?? ????????????????, ??????? ? ??????????? ?????????. ????? ????????? ???????? ??????? ? ?????????? ????? ???????? ????? ????? ????. ????????, ??? ???? ????????? ?????????? ????????????? ??????: ??? ?? ?????? ????????? 2000 ???????????? (??) ? ?????. ?????? ?????????? ???????? ???? ?????? ?? ??????? ? ??????????? ????????? ? ????? ????, ??????? ?? ?????????? ? ???? ????.  ??????????? ?????????? ? ????  ?????? ????? ?????? ??????? ? ????? ?????? ????????.??????????? ????????? ?????, ???? ??? ??? ???????? ?????. ??? ????????? ??????? ????????? ?????? ?????. ????????????? ????? ?????? ????????, ??????? ?? ??????????, ? ????? ?? ????? ?????????? ????? ????????.  ??? ???????? ? ???????? ?????????  ??????? ???????? ?????????.  · ??????? ???????? ?? ????????? ? ????????? ?????????. ? ????????? ???????, ??????? ?????? ?????????? ? ?????? ??????. ?????????, ??? ?? ?????????? ?????? ??????, ??????? ????????. ???? ?? ???????? ??????, ??? ??????????? ?????? ??????, ?? ??????????? ?????? ??????.  · ? ????????? ????????? ????? ?????? ?????????? ???????? ?????????? ?????? ?? ???????????????? ????????? ??????????? ????? ????????. ????????? ???????? ? ?????? ??????????? ???????? ?????????? ?????? ?? ???????????????? ????????? ???????????.  · ??????, ??? ?????? ????? ????????? ? ???????? ?? ?????? ? ?????????? ?????, ?? ? ? ?????????? ?????? (MSG), ???????? ?????? ? ??????????????? ?????? (??????? ?????). ???????? ?????? ????? ????????? ? ????????? ????. ???? ?? ????????? ??? ????, ?? ?????? ???????? ???? ??? ????????? ?????? ??? ??? ?????????? ????.  ????????? ???????? ? ?????? ??????????? ??????.  · ????????? ???????? ? ??????????? «?????????» (??? ?????????? ????), «??? ??????» (????? 5 ?? ?????? ? ????? ??????) ??? «? ?????? ??????????? ??????» (????? 140 ?? ?????? ? ????? ??????). ???????? ? ???????? «?? ????????? ??????????? ????» ? «????????????» ??? ????? ???????? ??????? ????? ??????. ??????????? ?????????? ????????, ????? ???????, ??????? ?????? ?? ?????????.  · ????????? ?????? ????? ??? ???????????? ????? ??? ?????????? ??????. ????????? ???????????????? ?????, ???? ? ?????? ???????? ? ?????? ??????????? ??????.  ???????? ????? ? ?????? ??????????? ??????.  · ?????????? ?????????? ????, ??????? ?? ??????????? ??? ???????. ??? ??????? ??? ?????????? ? ?????? ?????. ?? ?????????? ???? ????? ?????????????. ???? ?????? ????? ???? ???????? ????? 2300 ?? ??????.  · ??????? ??????? ?? ?????.  · ???????????? ???? ??? ????????, ???????? ?????, ?????, ???????, ??????? ? ????????. ?? ??????????? ? ???? ?????? ????, ?????? (???????????????) ?????? ????, ???? ??? ??????, ??????? ????, ????????? ????, ???????????? ????, ??????? ??? ??????.  · ??????????? ???????? ??? ??????, ????? ? ?????? ? ?????? ??????????? ??????. ??? ???????? ???? ???????? ??? ?????? ? ????? ??? ?????????? ????.  · ??????????? ??????? ?????????? ????, ??? ??????? ? ???????, ??? ???????? ?????? ??? ????. ???????? ?????????? ???? ????? ??????? ????? ??? ????????? ????? ?????. ????????? ????????, ???????? ???, ???????? ? ????????, ?? ??????? ?????????? ????.  · ???????? ???????????????? ????? ? ???????? ?? ? ??????? ????. ??? ???????? ??????? ????? ????, ?? ?? ??????? ?? ??? ?????????.  · ????????? ????????????? ????, ??????? ??????? ?? ??????? ??? ???????????? ???????????? ????, ??????? ????????? ? ??? ??????. ????????? ? ??????? ???????? ?? ?????????????, ????? ???????? ?????????? ?????? ? ???????????? ????. ???? ?? ????????? ?????????????? ????, ?????????? ???????? ? ????????? ???????? ????? ??? ??????.  ?????????? ?? ????????? ? ??????? ??????????? ??????.  · ?? ????????????:  ¨ ????????, ?????????? ? ???????????????? ????, ???? ? ?????;  ¨ ???????, ?????, ??????? ? ?????? ??????????;  ¨ ???????, ??????? ? ????????? ???? ? ??????? ?????????? ?????;  ¨ ??????? ? ???????? ????????? ? ?????? ??????? ???????, ???????? ?????, ????? ? ??????? ???????;  ¨ ???????????? ??????? ?????, ?? ??????????? ???, ??????? ?????????????? ??? ???????? ? ?????? ??????????? ??????;  ¨ ???????????????? ? ????? ????, ??????? ? ??????, ?? ??????????? ???, ??????? ?????????????? ??? ???????? ? ?????? ??????????? ??????;  ¨ ???????????????? ?????, ?? ??????????? ???, ??????? ?????????????? ??? ???????? ? ?????? ??????????? ??????;  ¨ ????????? ???, ?????, ????? ? ?????? ???? ????????;  ¨ ???????????? ??????, ??????, ?????? ? ?????? ????????, ???????? ?????? ????, ?? ??????????? ???, ??????? ?????????????? ??? ???????? ??? ?????? ??? ? ?????? ??????????? ??????.  ??? ????? ???????? ?????????????? ???????????  ????????? [email or web address] ????? A442 ? ?????? ?????? ?????????????? ?????????? \"????? ? ?????? ??????????? ?????? (2000 ????????????): ??????????? ?????????? - [ Low Sodium Diet (2,000 Milligram): Care Instructions ]. \"  ??????? ?????? ??: ??? 4, 2017  ?????? ????????: 11.4  © 6574-5263 [employer]wise, Inc[employer]. ??????????? ??????????, ?????????????? ? ???????????? ? ???????? ???????? ????? ??????. ???? ? ??? ????????? ??????? ? ?????-???? ??????????? ??? ?? ??????????, ?????????? ? ?????? ??????????, ??????? ?? ?????? ?????. ????????  [employer] ?? ???? ???????? ? ?? ????? ??????? ??????????????? ?? ??????????? ????????????? ?????? ??????????.       ??? ????? ????? ? ????????????? ?? ??????? ??? ???????? ???????. - [ Learning About Diabetes Food Guidelines ]  ?????????? ?? ??????? ?????  ???????????? ??????? ???????? ?????? ???????? ? ??????? ????????? ???????. ??? ???????? ???????????? ??????? ??????? ??????? ? ????? (???????, ??????? ?? ?????????? ????????? ? ??????). ????????????? ???? ?????? ????. ?? ?????? ???? ??, ??? ??? ???? ?????, ?????? ???? ???????. ?? ??????? ???????? ???????? ?? ??, ??? ????? ?? ????? ? ??????? ???????????? ???? ?? ????????.  ??? ?????? ? ???????????? ???????? ??????? ???? ? ??????? ?? ?????? ?????????? ? ????????? ??? ?????????????????? ??????????? ?? ????????? ???????. ???????? ??? ?????????? ?? ????????? ??????? ????? ????? ?????? ??? ????????, ???? ??? ?????? ? ???? ????.  ??? ?? ?????? ?????? ? ??????????? ??????????  ???????? ?????????? ?????????, ??????? ?? ???????????, ???????? ?????? ?????? ????????? ??????? ??? ???????? ???????. ???????? ?????????? ?? ?????? ?????????.  · ???????, ????? ???????? ???????? ????????. ????? ????, ??????? ?????????? ????????? ? ?????? ?????????.  ¨ ????, ????????, ???????? ? ??? ???????? ????? 15 ??????? ????????? ? ??????. ??????? ????????? 1 ????? ????? (30 ???????), 1/2 ????? ?????????????? ???????? ??? ??? 1/3 ????? ?????????????? ??????? ??? ????.  ¨ ?????? ???????? 15 ??????? ????????? ? ??????. ??????? ????????? 1 ????????? ?????? ?????, ???????? ?????? ??? ????????; ½ ??????; ½ ????? ?????????????? ??? ???????????????? ???????; ½ ????? ?????????? ????; 1 ????? ???? ??? ??????; 2 ???????? ????? ???????????.  ¨ ?????? ? ?????? ??? ?????????? ?????? ???????? 15 ??????? ????????? ? ??????. ??????? ????????? 1 ????? ?????? ??? 2/3 ????? ??????? ??? ?????????? ??????.  ¨ ???????????? ????? ???????? 15 ??????? ????????? ? ??????. ??????? ????????? ½ ????? ????????????? ???? ??? ???????? ?????????; 1 ????? ????????????? ?????; ½ ?????? ??????? ???????????; ½ ????? ?????????????? ?????; ½ ????? ?????????????? ???????? ??? ???????? ???????.  · ???????, ??????? ????????? ????? ???? ?????? ???? ? ? ?????? ??????? ????. ???????? ??? ?????????? ?? ????????? ??????? ????? ??????? ??? ??????? ???????????? ?????????? ???????????? ?????????. ??? ?????????? ????????? ?????????.  · ???? ?? ?? ??????? ? ???????????? ????? ????????? ??????? ?????????, ??????????? ???????? ????? ???????????? ???????. ??? ???????, ??????? ?????? ?????????? ???????????????? ????? ????. ?? ????? ???????? ???????????? ???????? ? ??????? ???.  ¨ ????????? ??? ?? ??????? ?? ????? ????. ???????? ?????????????? ????? ?? ???????? ???????, ???? ??? ?????? ???????? ????  ?? ???????? ???????, ? ???????? ??? ???????????? ?????  ?? ?????????? ???????? ???????. ? ????? ?????????? ?? ?????? ???????? ????????? ??????? ?????? ? 1 ????? ?????? ??? ??????? ? ??????????? ?? ????, ??????? ????????? ?? ????????????? ?????????? ?? ??????? ????.  · ?????????? ???? ?????????? ?????????? ????????? ??? ?????? ?????? ????. ?? «????????????» ???? ??????? ????? ?????????, ????? ?????? ?? ?? ???? ?????.  · ????? ???????? ????? ???? ??? ?????? ?? ???????? ????????? ? ??????. ????????? ?????? ????????: ????????, ??????, ???????, ????, ????, ????, ???, ?????? ? ?????????? ?????. ?????? ?????? ???? ?????????? 90 ???????, ??? ?? ???????? ????????????? ?????? ????. ?????? ??????????? ???? (?????? 30 ??????? ????) ????? ????????? ???????: 1/4 ????? ???????, 1 ????, 1 ???????? ????? ??????????? ????? ? 1/2 ????? ????.  ??? ????? ???????? ??? ???? ? ??? ?? ????? ???????? ???????? ??????  · ????????? ?????????? ??????? ??????, ??????? ???????? ????????. ???? ?? ?????? ????????? ???????? ??????? ?????? ????, ??? ????? ????? ??????? ?????? ?????? ??????????? ???.  · ???? ?????????, ??? ?????, ??????? ?? ????????, ???????? ??????? ????? ????????? (????????, ?????????, ???????? ??? ???????????????? ??????), ????????? ???????? ??? ?????? ? ?????? ??????????? ?????????. ???????? ????? ??? ??????? ?????.  · ???? ?? ??????? ???????, ?????????? ???? ??????? ??????? ? ????? ?? ? ????? ??? ??? ????, ????? ?????? ???????????, ??????? ????? ?????? ? ???????.  · ???? ?? ????? ???? ?? ????? ?????? ?????????, ??? ???????????, ???????????? ??? ???????? ??????????. ??? ??????? ??????? ??????? ??????? ? ?????.  ??? ??? ?? ?????? ??????  · ?????????? ??????????? ?????????? ?????, ???????? ????? ?? ???? ? ???????? ?????????. ??? ???????? ?????, ??? ??? ? ????? ? ???????? ???????? ??????? ???? ???????? ????????? ???????????. ??????? ????????? ??????? ????? ???? ? ???????? ???????? ? ?????? ??????????? ???? ??? ????????????. ??????????? ????????? ??? ????????? ????? ?????? ?????????? ????? ??? ?????????? ?????????? ?????, ????????????? ?? ????? ???????.  · ?? ??????????? ?????? ????. ???? ?? ?????????? ????? ???? ? ??????? ??????? ??? ???????????? ????????????? ????????? ??? ??????? ????????? ???????, ??? ??????? ??????? ????? ?????? ??????? ?????.  · ??????????????????? ? ??????, ?????? ??? ??????????? ????????. ???????? ????? ??????? ??????? ?????? ??????? ? ?????. ????? ????, ???? ?? ?????????? ???????????? ????????????? ????????? ??? ??????? ????????? ???????, ???????? ????? ???????? ??????????????? ???????? ???????.  ??????????? ?????????? ? ????  ?????? ????? ?????? ??????? ? ????? ?????? ????????.??????????? ????????? ?????, ???? ??? ??? ???????? ?????. ??? ????????? ??????? ????????? ?????? ?????. ????????????? ????? ?????? ????????, ??????? ?? ??????????, ? ????? ?? ????? ?????????? ????? ????????.  ??? ????? ???????? ?????????????? ???????????  ????????? [email or web address] ????? I147 ? ?????? ?????? ?????????????? ?????????? \"??? ????? ????? ? ????????????? ?? ??????? ??? ???????? ???????. - [ Learning About Diabetes Food Guidelines ]. \"  ??????? ?????? ??: ????? 13, 2017  ?????? ????????: 11.4  © 9819-7769 [employer], Inc[employer]. ??????????? ??????????, ?????????????? ? ???????????? ? ???????? ???????? ????? ??????. ???? ? ??? ????????? ??????? ? ?????-???? ??????????? ??? ?? ??????????, ?????????? ? ?????? ??????????, ??????? ?? ?????? ?????. ????????  [employer] ?? ???? ???????? ? ?? ????? ??????? ??????????????? ?? ??????????? ????????????? ?????? ??????????.

## 2018-05-24 NOTE — MR AVS SNAPSHOT
[address] 
 
 
 [address] [place] [address] 
[phone] Patient: [facility] HALEY MRN: [id number] FABRICIO:[birth year]49 Visit Information Date & Time Provider Department Dept. Phone Encounter #  
 5/24/2018  8:15 AM Anurag Bee, [address] [place] [address] [phone] [id number] Follow-up Instructions Return in about 3 months (around 8/24/2018) for Follow up. Your Appointments 7/10/2018  8:45 AM  
ROUTINE CARE with Anurag Bee MD  
[facility]) Appt Note: 3 months f/u appt  
 [address] [facility] [address]  
[phone]  
  
   
 [address] [address]  
  
    
 10/9/2018  8:45 AM  
ROUTINE CARE with Anurag Bee MD  
[address] [place] [address] [facility]) Appt Note: 6 months f/u appt  
 [address] [place] [address]  
[phone] Upcoming Health Maintenance Date Due ZOSTER VACCINE AGE 60> 12/14/2000 Pneumococcal 65+ High/Highest Risk (1 of 2 - PCV13) 2/14/2006 EYE EXAM RETINAL OR DILATED Q1 6/7/2018* GLAUCOMA SCREENING Q2Y 6/15/2018* Influenza Age 5 to Adult 8/1/2018 HEMOGLOBIN A1C Q6M 10/10/2018 MICROALBUMIN Q1 1/2/2019 LIPID PANEL Q1 1/2/2019 FOOT EXAM Q1 4/10/2019 MEDICARE YEARLY EXAM 4/11/2019 DTaP/Tdap/Td series (2 - Td) 8/6/2025 *Topic was postponed. The date shown is not the original due date. Allergies as of 5/24/2018  Review Complete On: 5/24/2018 By: Anurag Bee MD  
 No Known Allergies Current Immunizations  Reviewed on 12/26/2017 Name Date Td 9/30/1999, 3/29/1999, 1/28/1999 Tdap 8/6/2015 Not reviewed this visit You Were Diagnosed With   
  
 Codes Comments Iron deficiency anemia due to chronic blood loss    -  Primary ICD-10-CM: D50.0 ICD-9-CM: 280.0 Type 2 diabetes mellitus with nephropathy (HCC)     ICD-10-CM: E11.21 
ICD-9-CM: 250.40, 583.81 Essential hypertension     ICD-10-CM: I10 
ICD-9-CM: 401.9 Foot pain, bilateral     ICD-10-CM: M79.671, I82.256 ICD-9-CM: 729.5 Controlled type 2 diabetes mellitus without complication, without long-term current use of insulin ([facility]     ICD-10-CM: E11.9 ICD-9-CM: 250.00 Vitals BP Pulse Temp Resp Height(growth percentile) Weight(growth percentile) 138/76 (!) 57 98.4 °F (36.9 °C) (Oral) 18 5' 4\" (1.626 m) 192 lb (87.1 kg) SpO2 BMI Smoking Status 93% 32.96 kg/m2 Former Smoker Vitals History BMI and BSA Data Body Mass Index Body Surface Area  
 32.96 kg/m 2 1.98 m 2 Preferred Pharmacy Pharmacy Name Phone [facility] [address] [place] [address] [phone] Your Updated Medication List  
  
   
This list is accurate as of 5/24/18  8:52 AM.  Always use your most recent med list. amLODIPine 10 mg tablet Commonly known as:  Royal Rings Take 1 Tab by mouth daily. aspirin, buffered 81 mg Tab Take  by mouth daily. Taking twice weekly  
  
 atorvastatin 40 mg tablet Commonly known as:  LIPITOR Take 1 Tab by mouth daily. Replaces pravastatin  
  
 ferrous sulfate 325 mg (65 mg iron) Cper Take 1 Cap by mouth two (2) times a day. glucose blood VI test strips strip Commonly known as:  Ascensia CONTOUR Per insurance preference, test 1x/d dx diabetes E11.9 contour next test strips  
  
 metoprolol succinate 25 mg XL tablet Commonly known as:  TOPROL-XL Take 1 Tab by mouth daily. OCUVITE PRESERVISION PO Take 1 Tab by mouth daily. omega-3 acid ethyl esters 1 gram capsule Commonly known as:  Greg Correa TAKE 2 CAPSULES BY MOUTH EVERY DAY SITagliptin 100 mg tablet Commonly known as:  Jayla Castaneda Take 1 Tab by mouth daily. We Performed the Following CBC WITH AUTOMATED DIFF [66966 CPT(R)] HEMOGLOBIN A1C WITH EAG [33705 CPT(R)] IRON PROFILE [id number] CPT(R)] METABOLIC PANEL, BASIC [38619 CPT(R)] Follow-up Instructions Return in about 3 months (around 8/24/2018) for Follow up. Patient Instructions   
 
  
????? ? ?????? ??????????? ?????? (2000 ????????????): ??????????? ?????????? - [ Low Sodium Diet (2,000 Milligram): Care Instructions ] ?????????? ?? ??????? ????? 
??????? ??????? ?????????? ?????? ???????? ???????? ??????????? ?????????? ???????? ? ?????????. ??? ???? ????? ?????????? ???????????? ????????, ? ?????? ? ?????? ?????????? ???????? ??????. ? ????? ????????? ??????? ??? ????? ???????? ? ??????????????. ???????? ???? ?????? ?????. ??????????? ??????????? ??????, ?? ?????? ??????????? ???? ????? ? ??????? ???? ???????? ????????? ?????????. 
???????? ???????????????? ?????????? ?????? ???????? ?????????? ????. ???? ???????? ??????? ????? ???? ?? ????????????????, ??????? ? ??????????? ?????????. ????? ????????? ???????? ??????? ? ?????????? ????? ???????? ????? ????? ????. ????????, ??? ???? ????????? ?????????? ????????????? ??????: ??? ?? ?????? ????????? 2000 ???????????? (??) ? ?????. ?????? ?????????? ???????? ???? ?????? ?? ??????? ? ??????????? ????????? ? ????? ????, ??????? ?? ?????????? ? ???? ????. 
??????????? ?????????? ? ????  ?????? ????? ?????? ??????? ? ????? ?????? ????????.??????????? ????????? ?????, ???? ??? ??? ???????? ?????. ??? ????????? ??????? ????????? ?????? ?????. ????????????? ????? ?????? ????????, ??????? ?? ??????????, ? ????? ?? ????? ?????????? ????? ????????. 
??? ???????? ? ???????? ????????? 
??????? ???????? ?????????. 
· ??????? ???????? ?? ????????? ? ????????? ?????????. ? ????????? ???????, ??????? ?????? ?????????? ? ?????? ??????. ?????????, ??? ?? ?????????? ?????? ??????, ??????? ????????. ???? ?? ???????? ??????, ??? ??????????? ?????? ??????, ?? ??????????? ?????? ??????. 
· ? ????????? ????????? ????? ?????? ?????????? ???????? ?????????? ?????? ?? ???????????????? ????????? ??????????? ????? ????????. ????????? ???????? ? ?????? ??????????? ???????? ?????????? ?????? ?? ???????????????? ????????? ???????????. 
· ??????, ??? ?????? ????? ????????? ? ???????? ?? ?????? ? ?????????? ?????, ?? ? ? ?????????? ?????? (MSG), ???????? ?????? ? ??????????????? ?????? (??????? ?????). ???????? ?????? ????? ????????? ? ????????? ????. ???? ?? ????????? ??? ????, ?? ?????? ???????? ???? ??? ????????? ?????? ??? ??? ?????????? ????. 
????????? ???????? ? ?????? ??????????? ??????. 
· ????????? ???????? ? ??????????? «?????????» (??? ?????????? ????), «??? ??????» (????? 5 ?? ?????? ? ????? ??????) ??? «? ?????? ??????????? ??????» (????? 140 ?? ?????? ? ????? ??????). ???????? ? ???????? «?? ????????? ??????????? ????» ? «????????????» ??? ????? ???????? ??????? ????? ??????. ??????????? ?????????? ????????, ????? ???????, ??????? ?????? ?? ?????????. 
· ????????? ?????? ????? ??? ???????????? ????? ??? ?????????? ??????. ????????? ???????????????? ?????, ???? ? ?????? ???????? ? ?????? ??????????? ??????. 
???????? ????? ? ?????? ??????????? ??????. 
· ?????????? ?????????? ????, ??????? ?? ??????????? ??? ???????. ??? ??????? ??? ?????????? ? ?????? ?????. ?? ?????????? ???? ????? ?????????????. ???? ?????? ????? ???? ???????? ????? 2300 ?? ??????. 
· ??????? ??????? ?? ?????. 
· ???????????? ???? ??? ????????, ???????? ?????, ?????, ???????, ??????? ? ????????. ?? ??????????? ? ???? ?????? ????, ?????? (???????????????) ?????? ????, ???? ??? ??????, ??????? ????, ????????? ????, ???????????? ????, ??????? ??? ??????. 
· ??????????? ???????? ??? ??????, ????? ? ?????? ? ?????? ??????????? ??????. ??? ???????? ???? ???????? ??? ?????? ? ????? ??? ?????????? ????. 
· ??????????? ??????? ?????????? ????, ??? ??????? ? ???????, ??? ???????? ?????? ??? ????. ???????? ?????????? ???? ????? ??????? ????? ??? ????????? ????? ?????. ????????? ????????, ???????? ???, ???????? ? ????????, ?? ??????? ?????????? ????. 
· ???????? ???????????????? ????? ? ???????? ?? ? ??????? ????. ??? ???????? ??????? ????? ????, ?? ?? ??????? ?? ??? ?????????. 
· ????????? ????????????? ????, ??????? ??????? ?? ??????? ??? ???????????? ???????????? ????, ??????? ????????? ? ??? ??????. ????????? ? ??????? ???????? ?? ?????????????, ????? ???????? ?????????? ?????? ? ???????????? ????. ???? ?? ????????? ?????????????? ????, ?????????? ???????? ? ????????? ???????? ????? ??? ??????. 
?????????? ?? ????????? ? ??????? ??????????? ??????. 
· ?? ????????????: 
¨ ????????, ?????????? ? ???????????????? ????, ???? ? ?????; 
¨ ???????, ?????, ??????? ? ?????? ??????????; 
¨ ???????, ??????? ? ????????? ???? ? ??????? ?????????? ?????; 
¨ ??????? ? ???????? ????????? ? ?????? ??????? ???????, ???????? ?????, ????? ? ??????? ???????; 
¨ ???????????? ??????? ?????, ?? ??????????? ???, ??????? ?????????????? ??? ???????? ? ?????? ??????????? ??????; 
¨ ???????????????? ? ????? ????, ??????? ? ??????, ?? ??????????? ???, ??????? ?????????????? ??? ???????? ? ?????? ??????????? ??????; 
¨ ???????????????? ?????, ?? ??????????? ???, ??????? ?????????????? ??? ???????? ? ?????? ??????????? ??????; 
¨ ????????? ???, ?????, ????? ? ?????? ???? ????????; 
¨ ???????????? ??????, ??????, ?????? ? ?????? ????????, ???????? ?????? ????, ?? ??????????? ???, ??????? ?????????????? ??? ???????? ??? ?????? ??? ? ?????? ??????????? ??????. 
??? ????? ???????? ?????????????? ??????????? 
????????? [email or web address] ????? [id number] ? ?????? ?????? ?????????????? ?????????? \"????? ? ?????? ??????????? ?????? (2000 ????????????): ??????????? ?????????? - [ Low Sodium Diet (2,000 Milligram): Care Instructions ]. \" 
??????? ?????? ??: ??? 4, 2017 
?????? ????????: 11.4 © [id number] Healthwise, Incorporated. ??????????? ??????????, ?????????????? ? ???????????? ? ???????? ???????? ????? ??????. ???? ? ??? ????????? ??????? ? ?????-???? ??????????? ??? ?? ??????????, ?????????? ? ?????? ??????????, ??????? ?? ?????? ?????. ???????? Healthwise, Incorporated ?? ???? ???????? ? ?? ????? ??????? ??????????????? ?? ??????????? ????????????? ?????? ??????????. 
 
  
??? ????? ????? ? ????????????? ?? ??????? ??? ???????? ???????. - [ Learning About Diabetes Food Guidelines ] ?????????? ?? ??????? ????? 
???????????? ??????? ???????? ?????? ???????? ? ??????? ????????? ???????. ??? ???????? ???????????? ??????? ??????? ??????? ? ????? (???????, ??????? ?? ?????????? ????????? ? ??????). ????????????? ???? ?????? ????. ?? ?????? ???? ??, ??? ??? ???? ?????, ?????? ???? ???????. ?? ??????? ???????? ???????? ?? ??, ??? ????? ?? ????? ? ??????? ???????????? ???? ?? ????????. 
??? ?????? ? ???????????? ???????? ??????? ???? ? ??????? ?? ?????? ?????????? ? ????????? ??? ?????????????????? ??????????? ?? ????????? ???????. ???????? ??? ?????????? ?? ????????? ??????? ????? ????? ?????? ??? ????????, ???? ??? ?????? ? ???? ????. 
??? ?? ?????? ?????? ? ??????????? ?????????? 
???????? ?????????? ?????????, ??????? ?? ???????????, ???????? ?????? ?????? ????????? ??????? ??? ???????? ???????. ???????? ?????????? ?? ?????? ?????????. 
· ???????, ????? ???????? ???????? ????????. ????? ????, ??????? ?????????? ????????? ? ?????? ?????????. 
¨ ????, ????????, ???????? ? ??? ???????? ????? 15 ??????? ????????? ? ??????. ??????? ????????? 1 ????? ????? (30 ???????), 1/2 ????? ?????????????? ???????? ??? ??? 1/3 ????? ?????????????? ??????? ??? ????. 
¨ ?????? ???????? 15 ??????? ????????? ? ??????. ??????? ????????? 1 ????????? ?????? ?????, ???????? ?????? ??? ????????; ½ ??????; ½ ????? ?????????????? ??? ???????????????? ???????; ½ ????? ?????????? ????; 1 ????? ???? ??? ??????; 2 ???????? ????? ???????????. 
¨ ?????? ? ?????? ??? ?????????? ?????? ???????? 15 ??????? ????????? ? ??????. ??????? ????????? 1 ????? ?????? ??? 2/3 ????? ??????? ??? ?????????? ??????. 
¨ ???????????? ????? ???????? 15 ??????? ????????? ? ??????. ??????? ????????? ½ ????? ????????????? ???? ??? ???????? ?????????; 1 ????? ????????????? ?????; ½ ?????? ??????? ???????????; ½ ????? ?????????????? ?????; ½ ????? ?????????????? ???????? ??? ???????? ???????. 
· ???????, ??????? ????????? ????? ???? ?????? ???? ? ? ?????? ??????? ????. ???????? ??? ?????????? ?? ????????? ??????? ????? ??????? ??? ??????? ???????????? ?????????? ???????????? ?????????. ??? ?????????? ????????? ?????????. 
· ???? ?? ?? ??????? ? ???????????? ????? ????????? ??????? ?????????, ??????????? ???????? ????? ???????????? ???????. ??? ???????, ??????? ?????? ?????????? ???????????????? ????? ????. ?? ????? ???????? ???????????? ???????? ? ??????? ???. 
¨ ????????? ??? ?? ??????? ?? ????? ????. ???????? ?????????????? ????? ?? ???????? ???????, ???? ??? ?????? ???????? ????  ?? ???????? ???????, ? ???????? ??? ???????????? ?????  ?? ?????????? ???????? ???????. ? ????? ?????????? ?? ?????? ???????? ????????? ??????? ?????? ? 1 ????? ?????? ??? ??????? ? ??????????? ?? ????, ??????? ????????? ?? ????????????? ?????????? ?? ??????? ????. 
· ?????????? ???? ?????????? ?????????? ????????? ??? ?????? ?????? ????. ?? «????????????» ???? ??????? ????? ?????????, ????? ?????? ?? ?? ???? ?????. 
· ????? ???????? ????? ???? ??? ?????? ?? ???????? ????????? ? ??????. ????????? ?????? ????????: ????????, ??????, ???????, ????, ????, ????, ???, ?????? ? ?????????? ?????. ?????? ?????? ???? ?????????? 90 ???????, ??? ?? ???????? ????????????? ?????? ????. ?????? ??????????? ???? (?????? 30 ??????? ????) ????? ????????? ???????: 1/4 ????? ???????, 1 ????, 1 ???????? ????? ??????????? ????? ? 1/2 ????? ????. 
 ??? ????? ???????? ??? ???? ? ??? ?? ????? ???????? ???????? ?????? 
· ????????? ?????????? ??????? ??????, ??????? ???????? ????????. ???? ?? ?????? ????????? ???????? ??????? ?????? ????, ??? ????? ????? ??????? ?????? ?????? ??????????? ???. 
· ???? ?????????, ??? ?????, ??????? ?? ????????, ???????? ??????? ????? ????????? (????????, ?????????, ???????? ??? ???????????????? ??????), ????????? ???????? ??? ?????? ? ?????? ??????????? ?????????. ???????? ????? ??? ??????? ?????. 
· ???? ?? ??????? ???????, ?????????? ???? ??????? ??????? ? ????? ?? ? ????? ??? ??? ????, ????? ?????? ???????????, ??????? ????? ?????? ? ???????. 
· ???? ?? ????? ???? ?? ????? ?????? ?????????, ??? ???????????, ???????????? ??? ???????? ??????????. ??? ??????? ??????? ??????? ??????? ? ?????. 
??? ??? ?? ?????? ?????? 
· ?????????? ??????????? ?????????? ?????, ???????? ????? ?? ???? ? ???????? ?????????. ??? ???????? ?????, ??? ??? ? ????? ? ???????? ???????? ??????? ???? ???????? ????????? ???????????. ??????? ????????? ??????? ????? ???? ? ???????? ???????? ? ?????? ??????????? ???? ??? ????????????. ??????????? ????????? ??? ????????? ????? ?????? ?????????? ????? ??? ?????????? ?????????? ?????, ????????????? ?? ????? ???????. 
· ?? ??????????? ?????? ????. ???? ?? ?????????? ????? ???? ? ??????? ??????? ??? ???????????? ????????????? ????????? ??? ??????? ????????? ???????, ??? ??????? ??????? ????? ?????? ??????? ?????. 
· ??????????????????? ? ??????, ?????? ??? ??????????? ????????. ???????? ????? ??????? ??????? ?????? ??????? ? ?????. ????? ????, ???? ?? ?????????? ???????????? ????????????? ????????? ??? ??????? ????????? ???????, ???????? ????? ???????? ??????????????? ???????? ???????. 
??????????? ?????????? ? ????  ?????? ????? ?????? ??????? ? ????? ?????? ????????.??????????? ????????? ?????, ???? ??? ??? ???????? ?????. ??? ????????? ??????? ????????? ?????? ?????. ????????????? ????? ?????? ????????, ??????? ?? ??????????, ? ????? ?? ????? ?????????? ????? ????????. 
??? ????? ???????? ?????????????? ??????????? 
????????? [email or web address] ????? I147 ? ?????? ?????? ?????????????? ?????????? \"??? ????? ????? ? ????????????? ?? ??????? ??? ???????? ???????. - [ Learning About Diabetes Food Guidelines ]. \" 
??????? ?????? ??: ????? 13, 2017 
?????? ????????: 11.4 © 3673-0115 Healthwise, Incorporated. ??????????? ??????????, ?????????????? ? ???????????? ? ???????? ???????? ????? ??????. ???? ? ??? ????????? ??????? ? ?????-???? ??????????? ??? ?? ??????????, ?????????? ? ?????? ??????????, ??????? ?? ?????? ?????. ???????? Healthwise, Incorporated ?? ???? ???????? ? ?? ????? ??????? ??????????????? ?? ??????????? ????????????? ?????? ??????????. Introducing [facility]! [employer] introduces Focust patient portal. Now you can access parts of your medical record, email your doctor's office, and request medication refills online. 1. In your internet browser, go to [email or web address] 2. Click on the First Time User? Click Here link in the Sign In box. You will see the New Member Sign Up page. 3. Enter your [employer] Access Code exactly as it appears below. You will not need to use this code after youve completed the sign-up process. If you do not sign up before the expiration date, you must request a new code. · [employer] Access Code: [email or web address] Expires: 7/9/2018  9:12 AM 
 
4. Enter the last four digits of your Social Security Number (xxxx) and Date of Birth (mm/dd/yyyy) as indicated and click Submit. You will be taken to the next sign-up page. 5. Create a Focust ID. This will be your Focust login ID and cannot be changed, so think of one that is secure and easy to remember. 6. Create a [employer] password. You can change your password at any time. 7. Enter your Password Reset Question and Answer.  This can be used at a later time if you forget your password. 8. Enter your e-mail address. You will receive e-mail notification when new information is available in [address] 9. Click Sign Up. You can now view and download portions of your medical record. 10. Click the Download Summary menu link to download a portable copy of your medical information. If you have questions, please visit the Frequently Asked Questions section of the Zamzee website. Remember, Zamzee is NOT to be used for urgent needs. For medical emergencies, dial 911. Now available from your iPhone and Android! Please provide this summary of care documentation to your next provider. Your primary care clinician is listed as Isabelle Metcalf. If you have any questions after today's visit, please call [phone].

## 2018-05-24 NOTE — PROGRESS NOTES
Abdelrahman Briggs [address] [place], [address] [facility]  [phone]             Date of visit: 5/24/2018   Subjective:      History obtained from:  Patient, wife (who helps interpret). Karmen Queen is a 69 y.o. male who presents today for f/u chronic problems    Feeling much better  Foot/ankle pain gone    Still on iron, needs recheck anemia  Saw GI Dr. Caitlin Figueroa, did not recommend more tests  Weight is back up a bit    Avoiding sweets but sugar running higher, 200 at times in the morning. Exercising swimming or biking 3-4 days per week  Also walks the dog every day    Patient Active Problem List    Diagnosis Date Noted    Iron deficiency anemia due to chronic blood loss 04/18/2018    History of gastritis 04/18/2018    Heme positive stool 04/18/2018    Chronic ankle pain, bilateral 04/10/2018    Foot pain, bilateral 04/10/2018    Urinary hesitancy 04/10/2018    Elevated serum creatinine 01/06/2018    Type 2 diabetes mellitus with nephropathy ([facility] 01/04/2018    Microalbuminuria 01/04/2018    Chronic foot pain, right 01/02/2018    Essential hypertension 01/02/2018    Controlled type 2 diabetes mellitus without complication, without long-term current use of insulin ([facility] 01/02/2018    Mixed hyperlipidemia 01/02/2018     Current Outpatient Prescriptions   Medication Sig Dispense Refill    ferrous sulfate 325 mg (65 mg iron) cpER Take 1 Cap by mouth two (2) times a day. 180 Cap 0    VIT A/VIT C/VIT E/ZINC/COPPER (OCUVITE PRESERVISION PO) Take 1 Tab by mouth daily.  glucose blood VI test strips (ASCENSIA CONTOUR) strip Per insurance preference, test 1x/d dx diabetes E11.9 contour next test strips 100 Strip 3    atorvastatin (LIPITOR) 40 mg tablet Take 1 Tab by mouth daily. Replaces pravastatin 90 Tab 1    amLODIPine (NORVASC) 10 mg tablet Take 1 Tab by mouth daily. 90 Tab 1    SITagliptin (JANUVIA) 100 mg tablet Take 1 Tab by mouth daily.  90 Tab 1    metoprolol succinate (TOPROL-XL) 25 mg XL tablet Take 1 Tab by mouth daily. 90 Tab 1    omega-3 acid ethyl esters (LOVAZA) 1 gram capsule TAKE 2 CAPSULES BY MOUTH EVERY  Cap 0    Aspirin, Buffered 81 mg tab Take  by mouth daily. Taking twice weekly       No Known Allergies  Past Medical History:   Diagnosis Date    Diabetes ([facility]     type 2; niddm    Hypertension     Ill-defined condition     HIGH CHOLESTEROL     Past Surgical History:   Procedure Laterality Date    CARDIAC SURG PROCEDURE UNLIST  2018    2 stents placed     HX APPENDECTOMY      HX CATARACT REMOVAL Right     HX ENDOSCOPY      h pylori moderate chronic gastritis    HX GI  2013    COLONOSCOPY    HX HEENT Bilateral     STRABISMUS CORRECTION     Family History   Problem Relation Age of Onset    Diabetes Mother     Hypertension Mother    Yoly Frausto Other Father       age 55, [de-identified] stroke    Diabetes Sister     Hypertension Sister     Anesth Problems Neg Hx      Social History   Substance Use Topics    Smoking status: Former Smoker     Packs/day: 0.75     Years: 25.00     Quit date: 2004    Smokeless tobacco: Never Used    Alcohol use No      Social History     Social History Narrative        2 kids and 4 grandkids live closeby    [facility] [place] [facility] [place]        Review of Systems  Card: denies chest pain  Pulm: denies shortness of breath  GI: denies hematochezia        Objective:     Vitals:    18 0822 18 0849   BP: 144/74 138/76   Pulse: (!) 57    Resp: 18    Temp: 98.4 °F (36.9 °C)    TempSrc: Oral    SpO2: 93%    Weight: 192 lb (87.1 kg)    Height: 5' 4\" (1.626 m)      Body mass index is 32.96 kg/(m^2).      General: stated age, obese and in NAD  Neck: supple, symmetrical, trachea midline, no adenopathy and thyroid: not enlarged, symmetric, no tenderness/mass/nodules  Lungs:  clear to auscultation w/o rales, rhonchi, wheezes w/normal effort and no use of accessory muscles of respiration   Heart: regular rate and rhythm, S1, S2 normal, no murmur, click, rub or gallop  Abdomen: soft, nontender  Ext:  No edema noted. Lymph: no cervical adenopathy appreciated  Skin:  Normal. and no rash or abnormalities   Psych: alert and oriented to person, place, time and situation and Speech: appropriate quality, quantity and organization of sentences     Assessment/Plan:       ICD-10-CM ICD-9-CM    1. Iron deficiency anemia due to chronic blood loss D50.0 280.0 IRON PROFILE      CBC WITH AUTOMATED DIFF   2. Type 2 diabetes mellitus with nephropathy (HCC) N99.16 190.19 METABOLIC PANEL, BASIC     583.81 HEMOGLOBIN A1C WITH EAG   3. Essential hypertension I10 401.9    4. Foot pain, bilateral M79.671 729.5     M79.672     5. Controlled type 2 diabetes mellitus without complication, without long-term current use of insulin ([facility]) E11.9 250.00         Orders Placed This Encounter    IRON PROFILE    METABOLIC PANEL, BASIC    CBC WITH AUTOMATED DIFF    HEMOGLOBIN A1C WITH EAG       Will recheck anemia, hopefully will be able to stop iron  Recently saw GI  History of gastritis    Thankfully foot/ankle pains improved  Feeling well now!  encoruaged him to keep walking, exercising  Needs to avoid bread more  Sugars are worse, may need to add med, but will see a1c first    Discussed the diagnosis and plan and he expressed understanding. Follow-up Disposition:  Return in about 3 months (around 8/24/2018) for Follow up.     Cale Rick MD

## 2018-05-24 NOTE — PROGRESS NOTES
Chief Complaint   Patient presents with    Follow-up     pt has been feeling better and have been taking medication as prescribed.  Anemia    Ankle Pain     1. Have you been to the ER, urgent care clinic since your last visit? Hospitalized since your last visit? No    2. Have you seen or consulted any other health care providers outside of the [address] [place] [address] since your last visit? Include any pap smears or colon screening.  No

## 2018-05-25 LAB
BASOPHILS # BLD AUTO: 0 X10E3/UL (ref 0–0.2)
BASOPHILS NFR BLD AUTO: 0 %
BUN SERPL-MCNC: 25 MG/DL (ref 8–27)
BUN/CREAT SERPL: 21 (ref 10–24)
CALCIUM SERPL-MCNC: 9.9 MG/DL (ref 8.6–10.2)
CHLORIDE SERPL-SCNC: 103 MMOL/L (ref 96–106)
CO2 SERPL-SCNC: 24 MMOL/L (ref 18–29)
CREAT SERPL-MCNC: 1.21 MG/DL (ref 0.76–1.27)
EOSINOPHIL # BLD AUTO: 0.2 X10E3/UL (ref 0–0.4)
EOSINOPHIL NFR BLD AUTO: 3 %
ERYTHROCYTE [DISTWIDTH] IN BLOOD BY AUTOMATED COUNT: 20 % (ref 12.3–15.4)
EST. AVERAGE GLUCOSE BLD GHB EST-MCNC: 146 MG/DL
GFR SERPLBLD CREATININE-BSD FMLA CKD-EPI: 57 ML/MIN/1.73
GFR SERPLBLD CREATININE-BSD FMLA CKD-EPI: 66 ML/MIN/1.73
GLUCOSE SERPL-MCNC: 165 MG/DL (ref 65–99)
HBA1C MFR BLD: 6.7 % (ref 4.8–5.6)
HCT VFR BLD AUTO: 42.7 % (ref 37.5–51)
HGB BLD-MCNC: 13.7 G/DL (ref 13–17.7)
IMM GRANULOCYTES # BLD: 0 X10E3/UL (ref 0–0.1)
IMM GRANULOCYTES NFR BLD: 0 %
INTERPRETATION: NORMAL
IRON SATN MFR SERPL: 11 % (ref 15–55)
IRON SERPL-MCNC: 41 UG/DL (ref 38–169)
LYMPHOCYTES # BLD AUTO: 2 X10E3/UL (ref 0.7–3.1)
LYMPHOCYTES NFR BLD AUTO: 24 %
MCH RBC QN AUTO: 24.2 PG (ref 26.6–33)
MCHC RBC AUTO-ENTMCNC: 32.1 G/DL (ref 31.5–35.7)
MCV RBC AUTO: 76 FL (ref 79–97)
MONOCYTES # BLD AUTO: 0.6 X10E3/UL (ref 0.1–0.9)
MONOCYTES NFR BLD AUTO: 8 %
NEUTROPHILS # BLD AUTO: 5.2 X10E3/UL (ref 1.4–7)
NEUTROPHILS NFR BLD AUTO: 65 %
PLATELET # BLD AUTO: 179 X10E3/UL (ref 150–379)
POTASSIUM SERPL-SCNC: 4.5 MMOL/L (ref 3.5–5.2)
RBC # BLD AUTO: 5.65 X10E6/UL (ref 4.14–5.8)
SODIUM SERPL-SCNC: 141 MMOL/L (ref 134–144)
TIBC SERPL-MCNC: 358 UG/DL (ref 250–450)
UIBC SERPL-MCNC: 317 UG/DL (ref 111–343)
WBC # BLD AUTO: 8.1 X10E3/UL (ref 3.4–10.8)

## 2018-05-25 NOTE — PROGRESS NOTES
Sent in letter:  Blood counts are back to normal, but iron level is still a little low. I recommend you stay on the iron pills for one more month; after that, you can stop them! Sugar is well controlled. Kidney and electrolyte tests are fine. Keep up the good work!

## 2018-06-06 NOTE — TELEPHONE ENCOUNTER
Denise calling in ref to letting us know the reason for asking for more test strips is because pt was told to test twice per day.   Best # [phone]    Requested Prescriptions     Pending Prescriptions Disp Refills    glucose blood VI test strips (ASCENSIA CONTOUR) strip 100 Strip 3     Sig: Per insurance preference, test 1x/d dx diabetes E11.9 contour next test strips

## 2018-06-07 NOTE — TELEPHONE ENCOUNTER
Please call Pharm on file. They said they called twice and no response. They said please call TODAY.   [phone]

## 2018-06-08 NOTE — TELEPHONE ENCOUNTER
Per last Office Note on 5/24/18 patient is to test blood sugars one daily.  Patient insists he was told to test twice daily

## 2018-06-11 ENCOUNTER — TELEPHONE (OUTPATIENT)
Dept: FAMILY MEDICINE CLINIC | Age: 78
End: 2018-06-11

## 2018-06-11 NOTE — TELEPHONE ENCOUNTER
----- Message from [email or web address] sent at 6/11/2018  2:01 PM EDT -----  Regarding: /Refill  [facility] is requesting a call back in regards to not receiving a response to the request sent to rewrite the refill for Rx test strips.  ([facility] [id number])[phone]

## 2018-06-11 NOTE — TELEPHONE ENCOUNTER
Called pharmacy to let them know that we did the form back in April and I have re faxed it a couple of times. pharmacisst said that the pt wants to test his sugar 2x/d. Advised that pt can't test 2x/d he isn't on insulin. Pharmacist wanted me to contact pt to explain. I called pt and he wanted me to speak to wife which I did and explained that they could pay for the strips but Medicare won't cover it. Pt can alternate his schedule to test.  See below. pt can check sugar rotating - 1 day check in the am before breakfast, next day before lunch, next day before dinner and then next day before hs, then repeat. She states understanding.

## 2018-06-18 RX ORDER — OMEGA-3-ACID ETHYL ESTERS 1 G/1
CAPSULE, LIQUID FILLED ORAL
Qty: 180 CAP | Refills: 0 | Status: SHIPPED | OUTPATIENT
Start: 2018-06-18 | End: 2018-09-25 | Stop reason: SDUPTHER

## 2018-06-18 NOTE — TELEPHONE ENCOUNTER
Pharmacy on file verified  Requested Prescriptions     Pending Prescriptions Disp Refills    omega-3 acid ethyl esters (LOVAZA) 1 gram capsule 180 Cap 0

## 2018-08-16 ENCOUNTER — OFFICE VISIT (OUTPATIENT)
Dept: FAMILY MEDICINE CLINIC | Age: 78
End: 2018-08-16

## 2018-08-16 VITALS
OXYGEN SATURATION: 95 % | HEART RATE: 60 BPM | DIASTOLIC BLOOD PRESSURE: 89 MMHG | HEIGHT: 64 IN | WEIGHT: 200.4 LBS | BODY MASS INDEX: 34.21 KG/M2 | SYSTOLIC BLOOD PRESSURE: 175 MMHG | TEMPERATURE: 97.6 F | RESPIRATION RATE: 18 BRPM

## 2018-08-16 DIAGNOSIS — L40.9 PSORIASIS: ICD-10-CM

## 2018-08-16 DIAGNOSIS — G89.29 CHRONIC FOOT PAIN, RIGHT: ICD-10-CM

## 2018-08-16 DIAGNOSIS — E11.9 CONTROLLED TYPE 2 DIABETES MELLITUS WITHOUT COMPLICATION, WITHOUT LONG-TERM CURRENT USE OF INSULIN (HCC): ICD-10-CM

## 2018-08-16 DIAGNOSIS — Z86.2 HISTORY OF ANEMIA: ICD-10-CM

## 2018-08-16 DIAGNOSIS — M79.671 CHRONIC FOOT PAIN, RIGHT: ICD-10-CM

## 2018-08-16 DIAGNOSIS — I10 ESSENTIAL HYPERTENSION: Primary | ICD-10-CM

## 2018-08-16 RX ORDER — LOSARTAN POTASSIUM 25 MG/1
25 TABLET ORAL DAILY
Qty: 30 TAB | Refills: 6 | Status: SHIPPED | OUTPATIENT
Start: 2018-08-16 | End: 2018-09-05 | Stop reason: SDUPTHER

## 2018-08-16 NOTE — PROGRESS NOTES
Abdelrahman Briggs [address] [facility]  [address] [place], [address]  [phone]             Date of visit: 8/16/18   Subjective:      History obtained from:  Patient, wife. Jonah Chaudhari is a 69 y.o. male who presents today for several concerns    Rash on left ankle, doesn't itch or hurt, has been there about 2 months  Used prednisolone cream for about 5 days and did help some  Also tried an antiseptic cream      bp running high at home  Taking his amlodipine  Notes he used to be on valsartan maybe 10 years ago  bp at home usually 140-150    Concerned about his hemoglobin as he is off the iron now    He still has right foot pain but not very bad. Has gained 8lb  Tries to eat well, not too much sugar, not too much fruit, lots of veggies  No bread    Patient Active Problem List    Diagnosis Date Noted    Psoriasis 08/16/2018    Iron deficiency anemia due to chronic blood loss 04/18/2018    History of gastritis 04/18/2018    Heme positive stool 04/18/2018    Chronic ankle pain, bilateral 04/10/2018    Foot pain, bilateral 04/10/2018    Urinary hesitancy 04/10/2018    Elevated serum creatinine 01/06/2018    Type 2 diabetes mellitus with nephropathy ([address] 01/04/2018    Microalbuminuria 01/04/2018    Chronic foot pain, right 01/02/2018    Essential hypertension 01/02/2018    Controlled type 2 diabetes mellitus without complication, without long-term current use of insulin ([facility] 01/02/2018    Mixed hyperlipidemia 01/02/2018     Current Outpatient Prescriptions   Medication Sig Dispense Refill    losartan (COZAAR) 25 mg tablet Take 1 Tab by mouth daily.  30 Tab 6    omega-3 acid ethyl esters (LOVAZA) 1 gram capsule TAKE 2 CAPSULES BY MOUTH EVERY  Cap 0    glucose blood VI test strips (ASCENSIA CONTOUR) strip Per insurance preference, test 1x/d dx diabetes E11.9 contour next test strips 100 Strip 3    ferrous sulfate 325 mg (65 mg iron) cpER Take 1 Cap by mouth two (2) times a day. 180 Cap 0    VIT A/VIT C/VIT E/ZINC/COPPER (OCUVITE PRESERVISION PO) Take 1 Tab by mouth daily.  atorvastatin (LIPITOR) 40 mg tablet Take 1 Tab by mouth daily. Replaces pravastatin 90 Tab 1    amLODIPine (NORVASC) 10 mg tablet Take 1 Tab by mouth daily. 90 Tab 1    SITagliptin (JANUVIA) 100 mg tablet Take 1 Tab by mouth daily. 90 Tab 1    metoprolol succinate (TOPROL-XL) 25 mg XL tablet Take 1 Tab by mouth daily. 90 Tab 1    Aspirin, Buffered 81 mg tab Take  by mouth daily.  Taking twice weekly       No Known Allergies  Past Medical History:   Diagnosis Date    Diabetes ([facility]     type 2; niddm    Hypertension     Ill-defined condition     HIGH CHOLESTEROL     Past Surgical History:   Procedure Laterality Date    CARDIAC SURG PROCEDURE UNLIST  2018    2 stents placed     HX APPENDECTOMY      HX CATARACT REMOVAL Right     HX ENDOSCOPY      h pylori moderate chronic gastritis    HX GI  2013    COLONOSCOPY    HX HEENT Bilateral     STRABISMUS CORRECTION     Family History   Problem Relation Age of Onset    Diabetes Mother     Hypertension Mother    Dejuan Desouza Other Father       age 55, [de-identified] stroke    Diabetes Sister     Hypertension Sister     Anesth Problems Neg Hx      Social History   Substance Use Topics    Smoking status: Former Smoker     Packs/day: 0.75     Years: 25.00     Quit date: 2004    Smokeless tobacco: Never Used    Alcohol use No      Social History     Social History Narrative        2 kids and 4 grandkids live closeby    Originally from [facility] 66: denies chest pain other than little bit which is chronic of which is cardiologist is aware and tests were ok  Pulm: denies shortness of breath      Objective:     Vitals:    18 0816 18 0818   BP: (!) 172/94 175/89   Pulse: 64 60   Resp: 18    Temp: 97.6 °F (36.4 °C)    TempSrc: Oral    SpO2: 95%    Weight: 200 lb 6.4 oz (90.9 kg)    Height: 5' 4\" (1.626 m)      Body mass index is 34.4 kg/(m^2). General: stated age, obese and in NAD  Neck: supple, symmetrical, trachea midline, no adenopathy and thyroid: not enlarged, symmetric, no tenderness/mass/nodules  Lungs:  clear to auscultation w/o rales, rhonchi, wheezes w/normal effort and no use of accessory muscles of respiration   Heart: regular rate and rhythm, S1, S2 normal, no murmur, click, rub or gallop  Abdomen: soft, nontender, no masses  Ext:  No edema noted. Lymph: no cervical adenopathy appreciated  Skin:  Left ankle with 2 erythematous scaly plaques well-demarcated, one about 6cm, the other about 2cm diameter  Psych: alert and oriented to person, place, time and situation and Speech: appropriate quality, quantity and organization of sentences     Assessment/Plan:       ICD-10-CM ICD-9-CM    1. Essential hypertension L18 513.1 METABOLIC PANEL, BASIC   2. Controlled type 2 diabetes mellitus without complication, without long-term current use of insulin (HCC) E11.9 250.00 REFERRAL TO OPHTHALMOLOGY   3. Psoriasis L40.9 696.1    4. Chronic foot pain, right M79.671 729.5     G89.29 338.29    5.  History of anemia Z86.2 V12.3 CBC WITH AUTOMATED DIFF        Orders Placed This Encounter    METABOLIC PANEL, BASIC    CBC WITH AUTOMATED DIFF    REFERRAL TO OPHTHALMOLOGY    losartan (COZAAR) 25 mg tablet       Add losartan for uncontrolled bp  Come back for labs in 2-3 weeks  Will do blood counts at that time as they are concerned about anemia; however was good last check and off his iron pills now  DM well controlled, reassured him that is ok  Could consider resuming metformin in the future but holding off while we start losartan (was stopped when hospitalized but creatinine has been ok last few times)  Has prednisolone cream at home can use on psoriasis, advised using it for a full month (if not going away may consider biopsy but really looks like psoriasis)  Foot pain doing better    Discussed the diagnosis and plan and he expressed understanding. Follow-up Disposition:  Return in about 3 months (around 11/16/2018) for Follow up.     Julianna Runner, MD

## 2018-08-16 NOTE — MR AVS SNAPSHOT
Blain Kehr 
 
 
 [address] [place] [address] 
[phone] Patient: [facility] MRN: [id number] YSB:[id number] Visit Information Date & Time Provider Department Dept. Phone Encounter #  
 8/16/2018  8:15 AM Deyanira Donald, [address] [place] [address] Road [phone] [id number] Follow-up Instructions Return in about 3 months (around 11/16/2018) for Follow up. Your Appointments 10/9/2018  8:45 AM  
ROUTINE CARE with Deyanira Donald MD  
[facility]) Appt Note: 6 months f/u appt  
 [address] [address]  
[phone]  
  
   
 [address] [address] Upcoming Health Maintenance Date Due  
 EYE EXAM RETINAL OR DILATED Q1 2/14/1950 ZOSTER VACCINE AGE 60> 12/14/2000 GLAUCOMA SCREENING Q2Y 2/14/2006 Influenza Age 5 to Adult 8/1/2018 Pneumococcal 65+ High/Highest Risk (1 of 2 - PCV13) 5/24/2020* HEMOGLOBIN A1C Q6M 11/24/2018 MICROALBUMIN Q1 1/2/2019 LIPID PANEL Q1 1/2/2019 FOOT EXAM Q1 4/10/2019 DTaP/Tdap/Td series (2 - Td) 8/6/2025 *Topic was postponed. The date shown is not the original due date. Allergies as of 8/16/2018  Review Complete On: 8/16/2018 By: Valeriano James LPN No Known Allergies Current Immunizations  Reviewed on 12/26/2017 Name Date Td 9/30/1999, 3/29/1999, 1/28/1999 Tdap 8/6/2015 Not reviewed this visit You Were Diagnosed With   
  
 Codes Comments Essential hypertension    -  Primary ICD-10-CM: I10 
ICD-9-CM: 401.9 Controlled type 2 diabetes mellitus without complication, without long-term current use of insulin ([facility]     ICD-10-CM: E11.9 ICD-9-CM: 250.00 Psoriasis     ICD-10-CM: L40.9 ICD-9-CM: 696.1 Chronic foot pain, right     ICD-10-CM: M79.671, G89.29 ICD-9-CM: 729.5, 338.29 History of anemia     ICD-10-CM: Z86.2 ICD-9-CM: V12.3 Vitals BP Pulse Temp Resp Height(growth percentile) Weight(growth percentile) 175/89 (BP 1 Location: Left arm, BP Patient Position: Sitting) 60 97.6 °F (36.4 °C) (Oral) 18 5' 4\" (1.626 m) 200 lb 6.4 oz (90.9 kg) SpO2 BMI Smoking Status 95% 34.4 kg/m2 Former Smoker Vitals History BMI and BSA Data Body Mass Index Body Surface Area 34.4 kg/m 2 2.03 m 2 Preferred Pharmacy Pharmacy Name Phone [facility] [address] [facility] [phone] Your Updated Medication List  
  
   
This list is accurate as of 8/16/18  8:51 AM.  Always use your most recent med list. amLODIPine 10 mg tablet Commonly known as:  Wandra Sailaja Take 1 Tab by mouth daily. aspirin, buffered 81 mg Tab Take  by mouth daily. Taking twice weekly  
  
 atorvastatin 40 mg tablet Commonly known as:  LIPITOR Take 1 Tab by mouth daily. Replaces pravastatin  
  
 ferrous sulfate 325 mg (65 mg iron) Cper Take 1 Cap by mouth two (2) times a day. glucose blood VI test strips strip Commonly known as:  Ascensia CONTOUR Per insurance preference, test 1x/d dx diabetes E11.9 contour next test strips  
  
 losartan 25 mg tablet Commonly known as:  COZAAR Take 1 Tab by mouth daily. metoprolol succinate 25 mg XL tablet Commonly known as:  TOPROL-XL Take 1 Tab by mouth daily. OCUVITE PRESERVISION PO Take 1 Tab by mouth daily. omega-3 acid ethyl esters 1 gram capsule Commonly known as:  Stepan Mendez TAKE 2 CAPSULES BY MOUTH EVERY DAY SITagliptin 100 mg tablet Commonly known as:  Verónica Driscoll Take 1 Tab by mouth daily. Prescriptions Sent to Pharmacy Refills  
 losartan (COZAAR) 25 mg tablet 6 Sig: Take 1 Tab by mouth daily.   
 Class: Normal  
 Pharm[address]: [facility] [address] [facility] [address]  #: [phone] Route: Oral  
  
Follow-up Instructions Return in about 3 months (around 11/16/2018) for Follow up. To-Do List   
 09/01/2018 Lab:  CBC WITH AUTOMATED DIFF   
  
 09/01/2018 Lab:  METABOLIC PANEL, BASIC Patient Instructions   
 
  
????? DASH (??????????????? ?????? ? ??????? ??????????): ??????????? ?????????? - [ DASH Diet: Care Instructions ] ?????????? ?? ??????? ????? 
????? DASH ???????????? ????? ???? ???????, ??????? ??????? ??????? ???????????? ????????. ?????????? DASH ???????? «??????????????? ?????? ? ??????? ??????????». ??????????  ??? ??????? ???????????? ????????. 
???????? ???? ????? DASH ??????????? ? ??????????? ?????????, ??????? ????????, ?????? ? ???????. ??? ???????? ????? ??????? ???????????? ????????. ??????????, ???????? ??????? ???????????? ??????????, ???????? ??????, ?????, ???????? ???????? ? ?????? ??????????? ????, ?????, ??????? ? ???????. ?????? ????? ??????? ??????? ? ????????, ?????? ? ??????? ?????? ???????????? ?????????, ??????? ??????? ??????????, ?? ???????? ?????? ?? ???????. ????? ????, ????? DASH ???????? ??????? ?????, ???? ? ?????. 
????? DASH ???????? ????? ?? ?????????? ??????????? ????????? ?????? ?????, ??????? ??? ???? ????? ??????????????? ??? ???????? ????????????? ????????. ????? ??? ????, ????????, ????????? ????????? ?????????? ?????? ? ???????????? ???????. ?????????? ?????????? ?????? ??? ?????????? ????? DASH ????? ??????? ???????????? ???????? ??? ???????, ??? ????? DASH ???? ?? ????. 
??????????? ?????????? ? ????  ?????? ????? ?????? ??????? ? ????? ?????? ????????.??????????? ????????? ?????, ???? ??? ??? ???????? ?????. ??? ????????? ??????? ????????? ?????? ?????. ????????????? ????? ?????? ????????, ??????? ?? ??????????, ? ????? ?? ????? ?????????? ????? ????????. 
??? ???????? ? ???????? ????????? 
?????????? ????? DASH 
 · ?????????? ???? 45 ?????? ??????? ?????? ????. ??????? ???????? 1 ??????? ?????? ???????? ???????, ½ ????? ????? ?????????? ??? ???????????????? ???????, 1/4 ????? ??????????? ??? 100 ? (½ ?????) ?????????? ????. ???????????? ??????? ???????? ???????, ? ?? ????????? ?????. 
· ?????????? ???? 45 ?????? ?????? ?????? ????. ??????? ???????? 1 ????? ?????? ????? ??? ????? ???????? ??????, ½ ????? ????? ?????????? ??? ?????????? ???????????? ?????? ??? 100 ? (½ ?????) ???????? ????. ???????????? ??????? ???????? ??????, ? ?? ??????? ?????. 
· ????????? ????? 23 ?????? ???????? ????????? ? ?????? ??????????? ????? ??? ????????????. ??????? ???????? 200 ? ??????, 1 ????? ??????? ??? 40 ? ????. 
· ?????????? ???? 68 ?????? ???????? ?????? ????. ??????? ???????? 1 ????? ?????, 40 ? ????? ???????? ???? ??? ½ ????? ??????????????? ????, ??????? ??? ????. ?????????? ?? ??????????? ???????? ?????????????? ????????. 
· ?????????? ??????????? ???????? ????, ????? ? ???? ?? 2 ?????? ?????????. ?????? ?????????? 90 ?, ??? ?? ???????? ????????????? ?????? ????. 
· ?????? ?????? ???????? 45 ?????? ??????, ??????? ? ??????? (?????????????? ??????? ?????, ???????? ??? ???????? ??????). ??????? ???????? 1/3 ????? ??????, 2 ???????? ????? ??????? ??? ½ ????? ?????????????? ????? ??? ??????. 
· ????????????? ???? ? ????? ?? 23 ?????? ?????? ????. ?????? ?????????? 1 ?????? ????? ????????????? ????? ??? 2 ???????? ????? ???????? ????????. 
· ?????????? ??????? ? ?????????????? ????? ?? 5 ??? ????? ?????? ? ??????. ?????? ?????????? 1 ???????? ????? ???? ??? ?????, ½ ????? ??????? ??? 1 ????? ????????. 
· ???????????? ????? 2300 ?????????? (??) ?????? ? ????. ???? ?????????? ??????????? ?????? ?? 1500 ?? ? ????, ?? ?????? ???????? ???????????? ???????? ??? ???????. 
???????????? ???????????? ??? ?????????? ?????? 
· ??????? ? ??????. ?? ????????? ????? ?? ?????????? ???????? ???? ??????. ??? ????? ???????? ? ????, ??? ??? ????? ?????? ?? ??????? ??????? ????, ??? ??????? ??????????? ???????????? ?????. ??????? ????????? ????????? ? ??????????????? ??. ??? ?????? ??? ????????? ?????? ?????????, ???????? ??? ????????? ?????????. 
· ?????????? ??????? ????????? ?????????: 
¨ ???????? ????? ??????? ????? ??? ???? ? ?????? ??????? ???? ? ??? ?????????. ??? ???????? ????????? ???????????????? ?????????? ??????? ? ?????? ?????? ????. 
¨ ?????????? ???????????? ?????? ??? ????? ? ??????? ? ???????? ??????? ??? ????????? ???????. 
¨ ?????????? ? ?????????? ?????? ??????, ????????, ?????? ? ???. 
¨ ???????????? ??????? ?????? ??? ????? ? ???????? ????? ????????? ? ?????????? ???????. ?????????? ???????????? ????????, ?????, ??????, ????????, ???????, ??????? ??????? ? ???. 
¨ ? ???????? ?????, ??????????? ???????, ??????????? ????????????? ?????????? ????? ? ???????? ?????? ??????, ? ?? ????? ? ??????. 
¨ ????????? ?????? ???????? ????????????? ??? ????????? ???????. ??? ?????????? ????????? ??????? ???????? ????? ??? ??????? ? ?????????????? ??????. 
¨ ???????? ?????????????? ????? ? ?????? ? ???????. ?????????? ? ??????? ??? ??? ?????????? ??????. ???????? ??????? ? ???? ? ???? ?? ?????? ????? ??? ?????? ?????. 
??? ????? ???????? ?????????????? ??????????? 
????????? [email or web address] ????? [email or web address] ? ?????? ?????? ?????????????? ?????????? \"????? DASH (??????????????? ?????? ? ??????? ??????????): ??????????? ?????????? - [ DASH Diet: Care Instructions ]. \" 
??????? ?????? ??: ??????? 5, 2017 
?????? ????????: 11.7 © 7332-3938 [employer]wise[employer] Inc[employer]. ??????????? ??????????, ?????????????? ? ???????????? ? ???????? ???????? ????? ??????. ???? ? ??? ????????? ??????? ? ?????-???? ??????????? ??? ?? ??????????, ?????????? ? ?????? ??????????, ??????? ?? ?????? ?????. ????????  [employer]wise, Incorporated ?? ???? ???????? ? ?? ????? ??????? ??????????????? ?? ??????????? ????????????? ?????? ??????????. Introducing [facility] [place] & [facility] SERVICES! Geoff Desir introduces [employer] patient portal. Now you can access parts of your medical record, email your doctor's office, and request medication refills online. 1. In your internet browser, go to [email or web address] 2. Click on the First Time User? Click Here link in the Sign In box. You will see the New Member Sign Up page. 3. Enter your [employer] Access Code exactly as it appears below. You will not need to use this code after youve completed the sign-up process. If you do not sign up before the expiration date, you must request a new code. · [employer] Access Code: [email or web address] Expires: 11/14/2018  8:22 AM 
 
4. Enter the last four digits of your Social Security Number (xxxx) and Date of Birth (mm/dd/yyyy) as indicated and click Submit. You will be taken to the next sign-up page. 5. Create a [employer] ID. This will be your [employer] login ID and cannot be changed, so think of one that is secure and easy to remember. 6. Create a [employer] password. You can change your password at any time. 7. Enter your Password Reset Question and Answer. This can be used at a later time if you forget your password. 8. Enter your e-mail address. You will receive e-mail notification when new information is available in [address] 9. Click Sign Up. You can now view and download portions of your medical record. 10. Click the Download Summary menu link to download a portable copy of your medical information. If you have questions, please visit the Frequently Asked Questions section of the [employer] website. Remember, [employer] is NOT to be used for urgent needs. For medical emergencies, dial 911. Now available from your iPhone and Android! Please provide this summary of care documentation to your next provider. Your primary care clinician is listed as Cuca Le.  If you have any questions after today's visit, please call [phone].

## 2018-08-16 NOTE — PROGRESS NOTES
Chief Complaint   Patient presents with    Hypertension     3 month follow up    Diabetes     3 month follow up     1. Have you been to the ER, urgent care clinic since your last visit? Hospitalized since your last visit? No    2. Have you seen or consulted any other health care providers outside of the [address] [place] [address] since your last visit? Include any pap smears or colon screening.  No

## 2018-08-16 NOTE — PATIENT INSTRUCTIONS
????? DASH (??????????????? ?????? ? ??????? ??????????): ??????????? ?????????? - [ DASH Diet: Care Instructions ]  ?????????? ?? ??????? ?????  ????? DASH ???????????? ????? ???? ???????, ??????? ??????? ??????? ???????????? ????????. ?????????? DASH ???????? «??????????????? ?????? ? ??????? ??????????». ?????????? -- ??? ??????? ???????????? ????????.  ???????? ???? ????? DASH ??????????? ? ??????????? ?????????, ??????? ????????, ?????? ? ???????. ??? ???????? ????? ??????? ???????????? ????????. ??????????, ???????? ??????? ???????????? ??????????, ???????? ??????, ?????, ???????? ???????? ? ?????? ??????????? ????, ?????, ??????? ? ???????. ?????? ????? ??????? ??????? ? ????????, ?????? ? ??????? ?????? ???????????? ?????????, ??????? ??????? ??????????, ?? ???????? ?????? ?? ???????. ????? ????, ????? DASH ???????? ??????? ?????, ???? ? ?????.  ????? DASH ???????? ????? ?? ?????????? ??????????? ????????? ?????? ?????, ??????? ??? ???? ????? ??????????????? ??? ???????? ????????????? ????????. ????? ??? ????, ????????, ????????? ????????? ?????????? ?????? ? ???????????? ???????. ?????????? ?????????? ?????? ??? ?????????? ????? DASH ????? ??????? ???????????? ???????? ??? ???????, ??? ????? DASH ???? ?? ????.  ??????????? ?????????? ? ???? -- ?????? ????? ?????? ??????? ? ????? ?????? ????????.??????????? ????????? ?????, ???? ??? ??? ???????? ?????. ??? ????????? ??????? ????????? ?????? ?????. ????????????? ????? ?????? ????????, ??????? ?? ??????????, ? ????? ?? ????? ?????????? ????? ????????.  ??? ???????? ? ???????? ?????????  ?????????? ????? DASH  · ?????????? ???? 4-5 ?????? ??????? ?????? ????. ??????? ???????? 1 ??????? ?????? ???????? ???????, ½ ????? ????? ?????????? ??? ???????????????? ???????, 1/4 ????? ??????????? ??? 100 ? (½ ?????) ?????????? ????. ???????????? ??????? ???????? ???????, ? ?? ????????? ?????.  · ?????????? ???? 4-5 ?????? ?????? ?????? ????. ??????? ???????? 1 ????? ?????? ????? ??? ????? ???????? ??????, ½ ????? ????? ?????????? ??? ?????????? ???????????? ?????? ??? 100 ? (½ ?????) ???????? ????. ???????????? ??????? ???????? ??????, ? ?? ??????? ?????.  · ????????? ????? 2-3 ?????? ???????? ????????? ? ?????? ??????????? ????? ??? ????????????. ??????? ???????? 200 ? ??????, 1 ????? ??????? ??? 40 ? ????.  · ?????????? ???? 6-8 ?????? ???????? ?????? ????. ??????? ???????? 1 ????? ?????, 40 ? ????? ???????? ???? ??? ½ ????? ??????????????? ????, ??????? ??? ????. ?????????? ?? ??????????? ???????? ?????????????? ????????.  · ?????????? ??????????? ???????? ????, ????? ? ???? ?? 2 ?????? ?????????. ?????? ?????????? 90 ?, ??? ?? ???????? ????????????? ?????? ????.  · ?????? ?????? ???????? 4-5 ?????? ??????, ??????? ? ??????? (?????????????? ??????? ?????, ???????? ??? ???????? ??????). ??????? ???????? 1/3 ????? ??????, 2 ???????? ????? ??????? ??? ½ ????? ?????????????? ????? ??? ??????.  · ????????????? ???? ? ????? ?? 2-3 ?????? ?????? ????. ?????? ?????????? 1 ?????? ????? ????????????? ????? ??? 2 ???????? ????? ???????? ????????.  · ?????????? ??????? ? ?????????????? ????? ?? 5 ??? ????? ?????? ? ??????. ?????? ?????????? 1 ???????? ????? ???? ??? ?????, ½ ????? ??????? ??? 1 ????? ????????.  · ???????????? ????? 2300 ?????????? (??) ?????? ? ????. ???? ?????????? ??????????? ?????? ?? 1500 ?? ? ????, ?? ?????? ???????? ???????????? ???????? ??? ???????.  ???????????? ???????????? ??? ?????????? ??????  · ??????? ? ??????. ?? ????????? ????? ?? ?????????? ???????? ???? ??????. ??? ????? ???????? ? ????, ??? ??? ????? ?????? ?? ??????? ??????? ????, ??? ??????? ??????????? ???????????? ?????. ??????? ????????? ????????? ? ??????????????? ??. ??? ?????? ??? ????????? ?????? ?????????, ???????? ??? ????????? ?????????.  · ?????????? ??????? ????????? ?????????:  ¨ ???????? ????? ??????? ????? ??? ???? ? ?????? ??????? ???? ? ??? ?????????. ??? ???????? ????????? ???????????????? ?????????? ??????? ? ?????? ?????? ????.  ¨ ?????????? ???????????? ?????? ??? ????? ? ??????? ? ???????? ??????? ??? ????????? ???????.  ¨ ?????????? ? ?????????? ?????? ??????, ????????, ?????? ? ???.  ¨ ???????????? ??????? ?????? ??? ????? ? ???????? ????? ????????? ? ?????????? ???????. ?????????? ???????????? ????????, ?????, ??????, ????????, ???????, ??????? ??????? ? ???.  ¨ ? ???????? ?????, ??????????? ???????, ??????????? ????????????? ?????????? ????? ? ???????? ?????? ??????, ? ?? ????? ? ??????.  ¨ ????????? ?????? ???????? ????????????? ??? ????????? ???????. ??? ?????????? ????????? ??????? ???????? ????? ??? ??????? ? ?????????????? ??????.  ¨ ???????? ?????????????? ????? ? ?????? ? ???????. ?????????? ? ??????? ??? ??? ?????????? ??????. ???????? ??????? ? ???? ? ???? ?? ?????? ????? ??? ?????? ?????.  ??? ????? ???????? ?????????????? ???????????  ????????? [email or web address] ????? I428 ? ?????? ?????? ?????????????? ?????????? \"????? DASH (??????????????? ?????? ? ??????? ??????????): ??????????? ?????????? - [ DASH Diet: Care Instructions ]. \"  ??????? ?????? ??: ??????? 5, 2017  ?????? ????????: 11.7  © [id number] [employer]wise, Incorporated. ??????????? ??????????, ?????????????? ? ???????????? ? ???????? ???????? ????? ??????. ???? ? ??? ????????? ??????? ? ?????-???? ??????????? ??? ?? ??????????, ?????????? ? ?????? ??????????, ??????? ?? ?????? ?????. ????????  Healthwise, Incorporated ?? ???? ???????? ? ?? ????? ??????? ??????????????? ?? ??????????? ????????????? ?????? ??????????.

## 2018-08-28 ENCOUNTER — HOSPITAL ENCOUNTER (OUTPATIENT)
Dept: LAB | Age: 78
Discharge: HOME | End: 2018-08-28
Payer: MEDICARE

## 2018-08-28 ENCOUNTER — LAB ONLY (OUTPATIENT)
Dept: FAMILY MEDICINE CLINIC | Age: 78
End: 2018-08-28

## 2018-08-28 DIAGNOSIS — Z86.2 HISTORY OF ANEMIA: ICD-10-CM

## 2018-08-28 DIAGNOSIS — I10 ESSENTIAL HYPERTENSION: ICD-10-CM

## 2018-08-28 PROCEDURE — 85025 COMPLETE CBC W/AUTO DIFF WBC: CPT

## 2018-08-28 PROCEDURE — 80048 BASIC METABOLIC PNL TOTAL CA: CPT

## 2018-08-29 LAB
BASOPHILS # BLD AUTO: 0 X10E3/UL (ref 0–0.2)
BASOPHILS NFR BLD AUTO: 0 %
BUN SERPL-MCNC: 18 MG/DL (ref 8–27)
BUN/CREAT SERPL: 16 (ref 10–24)
CALCIUM SERPL-MCNC: 9.2 MG/DL (ref 8.6–10.2)
CHLORIDE SERPL-SCNC: 101 MMOL/L (ref 96–106)
CO2 SERPL-SCNC: 23 MMOL/L (ref 20–29)
CREAT SERPL-MCNC: 1.16 MG/DL (ref 0.76–1.27)
EOSINOPHIL # BLD AUTO: 0.2 X10E3/UL (ref 0–0.4)
EOSINOPHIL NFR BLD AUTO: 2 %
ERYTHROCYTE [DISTWIDTH] IN BLOOD BY AUTOMATED COUNT: 16.7 % (ref 12.3–15.4)
GLUCOSE SERPL-MCNC: 190 MG/DL (ref 65–99)
HCT VFR BLD AUTO: 42.9 % (ref 37.5–51)
HGB BLD-MCNC: 14.3 G/DL (ref 13–17.7)
IMM GRANULOCYTES # BLD: 0 X10E3/UL (ref 0–0.1)
IMM GRANULOCYTES NFR BLD: 0 %
LYMPHOCYTES # BLD AUTO: 1.6 X10E3/UL (ref 0.7–3.1)
LYMPHOCYTES NFR BLD AUTO: 21 %
MCH RBC QN AUTO: 26 PG (ref 26.6–33)
MCHC RBC AUTO-ENTMCNC: 33.3 G/DL (ref 31.5–35.7)
MCV RBC AUTO: 78 FL (ref 79–97)
MONOCYTES # BLD AUTO: 0.5 X10E3/UL (ref 0.1–0.9)
MONOCYTES NFR BLD AUTO: 6 %
NEUTROPHILS # BLD AUTO: 5.3 X10E3/UL (ref 1.4–7)
NEUTROPHILS NFR BLD AUTO: 71 %
PLATELET # BLD AUTO: 158 X10E3/UL (ref 150–379)
POTASSIUM SERPL-SCNC: 4.1 MMOL/L (ref 3.5–5.2)
RBC # BLD AUTO: 5.5 X10E6/UL (ref 4.14–5.8)
SODIUM SERPL-SCNC: 140 MMOL/L (ref 134–144)
WBC # BLD AUTO: 7.6 X10E3/UL (ref 3.4–10.8)

## 2018-08-29 NOTE — PROGRESS NOTES
Sent in letter:  Blood counts are still very good! You don't need the iron pills. Kidney and electrolytes were fine. I hope the medication is helping your blood pressure!

## 2018-09-25 RX ORDER — OMEGA-3-ACID ETHYL ESTERS 1 G/1
CAPSULE, LIQUID FILLED ORAL
Qty: 180 CAP | Refills: 0 | Status: SHIPPED | OUTPATIENT
Start: 2018-09-25 | End: 2018-12-20 | Stop reason: SDUPTHER

## 2018-09-25 NOTE — TELEPHONE ENCOUNTER
Pharmacy on file verified. LOV 08/28/18.    last refill  06/18/18  Requested Prescriptions     Pending Prescriptions Disp Refills    omega-3 acid ethyl esters (LOVAZA) 1 gram capsule 180 Cap 0     Sig: TAKE 2 CAPSULES BY MOUTH EVERY DAY

## 2018-10-08 NOTE — TELEPHONE ENCOUNTER
Pharm on file verified. Last office visit 08/28/18    Requested Prescriptions     Pending Prescriptions Disp Refills    amLODIPine (NORVASC) 10 mg tablet 90 Tab 1     Sig: Take 1 Tab by mouth daily.  atorvastatin (LIPITOR) 40 mg tablet 90 Tab 1     Sig: Take 1 Tab by mouth daily. Replaces pravastatin    metoprolol succinate (TOPROL-XL) 25 mg XL tablet 90 Tab 1     Sig: Take 1 Tab by mouth daily.

## 2018-10-09 RX ORDER — AMLODIPINE BESYLATE 10 MG/1
10 TABLET ORAL DAILY
Qty: 90 TAB | Refills: 1 | Status: SHIPPED | OUTPATIENT
Start: 2018-10-09 | End: 2019-02-15 | Stop reason: SDUPTHER

## 2018-10-09 RX ORDER — ATORVASTATIN CALCIUM 40 MG/1
40 TABLET, FILM COATED ORAL DAILY
Qty: 90 TAB | Refills: 1 | Status: SHIPPED | OUTPATIENT
Start: 2018-10-09 | End: 2019-04-12 | Stop reason: SDUPTHER

## 2018-10-09 RX ORDER — METOPROLOL SUCCINATE 25 MG/1
25 TABLET, EXTENDED RELEASE ORAL DAILY
Qty: 90 TAB | Refills: 1 | Status: SHIPPED | OUTPATIENT
Start: 2018-10-09 | End: 2019-04-12 | Stop reason: SDUPTHER

## 2018-11-09 ENCOUNTER — HOSPITAL ENCOUNTER (OUTPATIENT)
Dept: MRI IMAGING | Age: 78
Discharge: HOME | End: 2018-11-09
Attending: ORTHOPAEDIC SURGERY
Payer: MEDICARE

## 2018-11-09 DIAGNOSIS — S83.206A TEAR OF MENISCUS OF RIGHT KNEE, INITIAL ENCOUNTER: ICD-10-CM

## 2018-11-09 PROCEDURE — 73721 MRI JNT OF LWR EXTRE W/O DYE: CPT

## 2018-11-15 ENCOUNTER — OFFICE VISIT (OUTPATIENT)
Dept: FAMILY MEDICINE CLINIC | Age: 78
End: 2018-11-15

## 2018-11-15 ENCOUNTER — HOSPITAL ENCOUNTER (OUTPATIENT)
Dept: LAB | Age: 78
Discharge: HOME | End: 2018-11-15
Payer: MEDICARE

## 2018-11-15 VITALS
SYSTOLIC BLOOD PRESSURE: 132 MMHG | HEART RATE: 54 BPM | DIASTOLIC BLOOD PRESSURE: 74 MMHG | TEMPERATURE: 97.4 F | OXYGEN SATURATION: 97 % | HEIGHT: 64 IN | BODY MASS INDEX: 34.4 KG/M2 | RESPIRATION RATE: 16 BRPM

## 2018-11-15 DIAGNOSIS — E11.21 TYPE 2 DIABETES MELLITUS WITH NEPHROPATHY (HCC): ICD-10-CM

## 2018-11-15 DIAGNOSIS — R79.89 ELEVATED SERUM CREATININE: ICD-10-CM

## 2018-11-15 DIAGNOSIS — E78.2 MIXED HYPERLIPIDEMIA: ICD-10-CM

## 2018-11-15 DIAGNOSIS — I10 ESSENTIAL HYPERTENSION: ICD-10-CM

## 2018-11-15 DIAGNOSIS — E11.9 CONTROLLED TYPE 2 DIABETES MELLITUS WITHOUT COMPLICATION, WITHOUT LONG-TERM CURRENT USE OF INSULIN (HCC): Primary | ICD-10-CM

## 2018-11-15 DIAGNOSIS — D50.0 IRON DEFICIENCY ANEMIA DUE TO CHRONIC BLOOD LOSS: ICD-10-CM

## 2018-11-15 DIAGNOSIS — M25.569 CHRONIC KNEE PAIN, UNSPECIFIED LATERALITY: ICD-10-CM

## 2018-11-15 DIAGNOSIS — G89.29 CHRONIC KNEE PAIN, UNSPECIFIED LATERALITY: ICD-10-CM

## 2018-11-15 PROCEDURE — 36415 COLL VENOUS BLD VENIPUNCTURE: CPT

## 2018-11-15 PROCEDURE — 83036 HEMOGLOBIN GLYCOSYLATED A1C: CPT

## 2018-11-15 PROCEDURE — 80053 COMPREHEN METABOLIC PANEL: CPT

## 2018-11-15 PROCEDURE — 82043 UR ALBUMIN QUANTITATIVE: CPT

## 2018-11-15 PROCEDURE — 80061 LIPID PANEL: CPT

## 2018-11-15 PROCEDURE — 85025 COMPLETE CBC W/AUTO DIFF WBC: CPT

## 2018-11-15 RX ORDER — LOSARTAN POTASSIUM 50 MG/1
50 TABLET ORAL DAILY
COMMUNITY
Start: 2018-11-15 | End: 2019-02-15 | Stop reason: DRUGHIGH

## 2018-11-15 RX ORDER — METFORMIN HYDROCHLORIDE 500 MG/1
500 TABLET ORAL 2 TIMES DAILY WITH MEALS
Qty: 180 TAB | Refills: 1 | Status: SHIPPED | OUTPATIENT
Start: 2018-11-15 | End: 2019-05-09 | Stop reason: SDUPTHER

## 2018-11-15 NOTE — PROGRESS NOTES
[address] [place], [address] 
[phone] Date of visit: 11/15/2018 Subjective:  
  
History obtained from:  Patient, wife. Alba Mckeon is a 69 y.o. male who presents today for follow up chronic problems Severe right knee pain, on crutches Went to see ortho, told him to stay off knee for 6 weeks and then do a follow up They did MRI, did not recommend surgery We had started losartan for high BP Cardiologist recently increased the dose 
bp doing well when checked Head sometimes pounds when he gets up, feels heartbeat in head, not painful DM usually controlled Sugars inching up last 6 mo This , has gotten to 190 Always under 200 Had been taken metformin in past, at some point was stopped for mildly elevated creatinine Creatinine has been fine in recent months Diet still good Eating lots of veggies Was doing a lot of exercise before the knee Also had some gastritis, iron def anemia, resolved on last check Would like recheck No longer on iron pills Saw GI, had capsule study, colonoscopy, EGD Concerned weight will be going up as he is more sedentary Rash on ankle gone, stil uses cream if seems to be coming back Declining all vaccines,which were discussed again Patient Active Problem List  
 Diagnosis Date Noted  Chronic knee pain 11/15/2018  Psoriasis 08/16/2018  Iron deficiency anemia due to chronic blood loss 04/18/2018  History of gastritis 04/18/2018  Heme positive stool 04/18/2018  Chronic ankle pain, bilateral 04/10/2018  Foot pain, bilateral 04/10/2018  Urinary hesitancy 04/10/2018  Elevated serum creatinine 01/06/2018  Type 2 diabetes mellitus with nephropathy ([facility] 01/04/2018  Microalbuminuria 01/04/2018  Chronic foot pain, right 01/02/2018  Essential hypertension 01/02/2018  Controlled type 2 diabetes mellitus without complication, without long-term current use of insulin ([facility] 2018  Mixed hyperlipidemia 2018 Current Outpatient Medications Medication Sig Dispense Refill  metFORMIN (GLUCOPHAGE) 500 mg tablet Take 1 Tab by mouth two (2) times daily (with meals). 180 Tab 1  
 losartan (COZAAR) 50 mg tablet Take 1 Tab by mouth daily.  amLODIPine (NORVASC) 10 mg tablet Take 1 Tab by mouth daily. 90 Tab 1  
 atorvastatin (LIPITOR) 40 mg tablet Take 1 Tab by mouth daily. Replaces pravastatin 90 Tab 1  
 metoprolol succinate (TOPROL-XL) 25 mg XL tablet Take 1 Tab by mouth daily. 90 Tab 1  
 omega-3 acid ethyl esters (LOVAZA) 1 gram capsule TAKE 2 CAPSULES BY MOUTH EVERY  Cap 0  
 glucose blood VI test strips (ASCENSIA CONTOUR) strip Per insurance preference, test 1x/d dx diabetes E11.9 contour next test strips 100 Strip 3  
 VIT A/VIT C/VIT E/ZINC/COPPER (OCUVITE PRESERVISION PO) Take 1 Tab by mouth daily.  SITagliptin (JANUVIA) 100 mg tablet Take 1 Tab by mouth daily. 90 Tab 1  Aspirin, Buffered 81 mg tab Take  by mouth daily. Taking twice weekly No Known Allergies Past Medical History:  
Diagnosis Date  Diabetes ([facility] type 2; niddm  Hypertension  Ill-defined condition HIGH CHOLESTEROL Past Surgical History:  
Procedure Laterality Date  CARDIAC SURG PROCEDURE UNLIST  2018  
 2 stents placed [address] [facility]  HX CATARACT REMOVAL Right  HX COLONOSCOPY   COLONOSCOPY  
 HX COLONOSCOPY  2018  
 repeat 3 years  HX ENDOSCOPY  2018  
 h pylori moderate chronic gastritis  HX HEENT Bilateral   
 STRABISMUS CORRECTION Family History Problem Relation Age of Onset  Diabetes Mother  Hypertension Mother  Other Father   
      age 55, maybe stroke  Diabetes Sister  Hypertension Sister  Anesth Problems Neg Hx Social History Tobacco Use  Smoking status: Former Smoker Packs/day: 0.75   Years: 25.00  
 Pack years: 18.75 Last attempt to quit: 2004 Years since quittin.8  Smokeless tobacco: Never Used Substance Use Topics  Alcohol use: No  
  
Social History Social History Narrative  2 kids and 4 grandkids live closeby Originally from [facility] Review of Systems Card: denies chest pain Pulm: denies shortness of breath Objective:  
 
Vitals:  
 11/15/18 0830 BP: 132/74 Pulse: (!) 54 Resp: 16 Temp: 97.4 °F (36.3 °C) TempSrc: Oral  
SpO2: 97% Height: 5' 4\" (1.626 m) Body mass index is 34.4 kg/m². General: stated age, obese and in NAD Neck: supple, symmetrical, trachea midline, no adenopathy and thyroid: not enlarged, symmetric, no tenderness/mass/nodules Lungs:  clear to auscultation w/o rales, rhonchi, wheezes w/normal effort and no use of accessory muscles of respiration Heart: regular rate and rhythm, S1, S2 normal, no murmur, click, rub or gallop Abdomen: soft, nontender, no masses Ext:  Trace left ankle edema noted, 1+ right ankle edema he thinks due to knee pain Lymph: no cervical adenopathy appreciated Skin:  Normal. and no rash or abnormalities Psych: alert and oriented to person, place, time and situation and Speech: appropriate quality, quantity and organization of sentences Assessment/Plan: ICD-10-CM ICD-9-CM 1. Controlled type 2 diabetes mellitus without complication, without long-term current use of insulin ([facility]) E11.9 250.00   
2. Essential hypertension I10 401.9 3. Elevated serum creatinine R79.89 790.99   
4. Mixed hyperlipidemia E78.2 272.2 5. Type 2 diabetes mellitus with nephropathy ([facility]) X73.76 270.36 METABOLIC PANEL, COMPREHENSIVE  
  583.81 LIPID PANEL  
   HEMOGLOBIN A1C WITH EAG  
   MICROALBUMIN, UR, RAND W/ MICROALB/CREAT RATIO 6. Iron deficiency anemia due to chronic blood loss D50.0 280.0 CBC WITH AUTOMATED DIFF 7.  Chronic knee pain, unspecified laterality M25.569 719.46   
 G89.29 338.29 Orders Placed This Encounter  CBC WITH AUTOMATED DIFF  
 METABOLIC PANEL, COMPREHENSIVE  LIPID PANEL  
 HEMOGLOBIN A1C WITH EAG  
 MICROALBUMIN, UR, RAND W/ MICROALB/CREAT RATIO  metFORMIN (GLUCOPHAGE) 500 mg tablet  losartan (COZAAR) 50 mg tablet Chronic problems stable, will add back metformin as creatinine has been fine Continue to monitor cbc as he didn't really have symptoms of bleeding or anemia before, has happened twice Followed by GI 
bp doing well on increased losartan, no change Encouraged him to keep up the good work with healthy eating/exercise Right knee a big problem keeping him from being active but followed by ortho *Need to send labs to cardiology Dr. Damion Hodgson* Discussed the diagnosis and plan and he expressed understanding. Follow-up Disposition: 
Return in about 3 months (around 2/15/2019) for Annual Wellness Visit, Follow up. (and in 1 month for lab) Danay Singh MD

## 2018-11-15 NOTE — PROGRESS NOTES
Chief Complaint Patient presents with  Hypertension  
  follow up  Diabetes  
  follow up Reviewed Record in preparation for visit and have obtained necessary documentation. Identified pt with two pt identifiers (Name @ ) Health Maintenance Due Topic  
 EYE EXAM RETINAL OR DILATED Q1   
 Shingrix Vaccine Age 50> (1 of 2)  GLAUCOMA SCREENING Q2Y  Pneumococcal 65+ Low/Medium Risk (1 of 2 - PCV13)  Influenza Age 5 to Adult  HEMOGLOBIN A1C Q6M   
 
 
 
1. Have you been to the ER, urgent care clinic since your last visit? Hospitalized since your last visit? No 
 
2. Have you seen or consulted any other health care providers outside of the [address] [place] [address] since your last visit? Include any pap smears or colon screening.  No

## 2018-11-15 NOTE — LETTER
11/17/2018 11:19 AM 
 
Mr. Galileo Arce [address] [employer] [facility] [phone] Dear Galileo Arce: 
 
Please find your most recent results below. Resulted Orders CBC WITH AUTOMATED DIFF Result Value Ref Range WBC 7.3 3.4 - 10.8 x10E3/uL  
 RBC 5.52 4.14 - 5.80 x10E6/uL HGB 15.1 13.0 - 17.7 g/dL HCT 44.4 37.5 - 51.0 % MCV 80 79 - 97 fL  
 MCH 27.4 26.6 - 33.0 pg  
 MCHC 34.0 31.5 - 35.7 g/dL  
 RDW 15.3 12.3 - 15.4 % PLATELET 652 638 - 620 x10E3/uL NEUTROPHILS 72 Not Estab. % Lymphocytes 19 Not Estab. % MONOCYTES 8 Not Estab. % EOSINOPHILS 1 Not Estab. % BASOPHILS 0 Not Estab. %  
 ABS. NEUTROPHILS 5.3 1.4 - 7.0 x10E3/uL Abs Lymphocytes 1.4 0.7 - 3.1 x10E3/uL  
 ABS. MONOCYTES 0.6 0.1 - 0.9 x10E3/uL  
 ABS. EOSINOPHILS 0.1 0.0 - 0.4 x10E3/uL  
 ABS. BASOPHILS 0.0 0.0 - 0.2 x10E3/uL IMMATURE GRANULOCYTES 0 Not Estab. %  
 ABS. IMM. GRANS. 0.0 0.0 - 0.1 x10E3/uL METABOLIC PANEL, COMPREHENSIVE Result Value Ref Range Glucose 155 (H) 65 - 99 mg/dL BUN 20 8 - 27 mg/dL Creatinine 1.10 0.76 - 1.27 mg/dL GFR est non-AA 64 >59 mL/min/1.73 GFR est AA 74 >59 mL/min/1.73  
 BUN/Creatinine ratio 18 10 - 24 Sodium 143 134 - 144 mmol/L Potassium 4.6 3.5 - 5.2 mmol/L Chloride 104 96 - 106 mmol/L  
 CO2 26 20 - 29 mmol/L Calcium 9.9 8.6 - 10.2 mg/dL Protein, total 6.9 6.0 - 8.5 g/dL Albumin 4.3 3.5 - 4.8 g/dL GLOBULIN, TOTAL 2.6 1.5 - 4.5 g/dL A-G Ratio 1.7 1.2 - 2.2 Bilirubin, total 0.5 0.0 - 1.2 mg/dL Alk. phosphatase 106 39 - 117 IU/L  
 AST (SGOT) 16 0 - 40 IU/L  
 ALT (SGPT) 16 0 - 44 IU/L  
LIPID PANEL Result Value Ref Range Cholesterol, total 142 100 - 199 mg/dL Triglyceride 144 0 - 149 mg/dL HDL Cholesterol 55 >39 mg/dL VLDL, calculated 29 5 - 40 mg/dL LDL, calculated 58 0 - 99 mg/dL HEMOGLOBIN A1C WITH EAG Result Value Ref Range Hemoglobin A1c 7.8 (H) 4.8 - 5.6 % Comment: Prediabetes: 5.7 - 6.4 Diabetes: >6.4 Glycemic control for adults with diabetes: <7.0 Estimated average glucose 177 mg/dL MICROALBUMIN, UR, RAND W/ MICROALB/CREAT RATIO Result Value Ref Range Creatinine, urine 104.8 Not Estab. mg/dL Microalbumin, urine 125.2 Not Estab. ug/mL Microalb/Creat ratio (ug/mg creat.) 119.5 (H) 0.0 - 30.0 mg/g creat Comment:  
                        Normal:                0.0 -  30.0 Albuminuria:          31.0 - 300.0 Clinical albuminuria:       >300.0 
  
 
 
RECOMMENDATIONS: 
Cholesterol, kidney, liver, electrolytes, and blood counts were fine. a1c diabetes test is quite a bit higher but is still good enough. Please just be careful to avoid sweets or excessive carbs in your diet! Going back on the metformin should also help, and perhaps we can wean off the Januvia if labs are better next time. Please call me if you have any questions: [phone] Sincerely, Ely Cantu MD

## 2018-11-15 NOTE — PATIENT INSTRUCTIONS
OK to resume metformin Start with once daily After 1 week you can go to twice daily Please come back to our office to check your kidney function (blood test) in 1 month. There is no need to see me, just stop by and ask to go to the lab ??? ????? ????? ? ????????????? ?? ??????? ??? ???????? ???????. - [ Learning About Diabetes Food Guidelines ] ?????????? ?? ??????? ????? 
???????????? ??????? ???????? ?????? ???????? ? ??????? ????????? ???????. ??? ???????? ???????????? ??????? ??????? ??????? ? ????? (???????, ??????? ?? ?????????? ????????? ? ??????). ????????????? ???? ?????? ????. ?? ?????? ???? ??, ??? ??? ???? ?????, ?????? ???? ???????. ?? ??????? ???????? ???????? ?? ??, ??? ????? ?? ????? ? ??????? ???????????? ???? ?? ????????. 
??? ?????? ? ???????????? ???????? ??????? ???? ? ??????? ?? ?????? ?????????? ? ????????? ??? ?????????????????? ??????????? ?? ????????? ???????. ???????? ??? ?????????? ?? ????????? ??????? ????? ????? ?????? ??? ????????, ???? ??? ?????? ? ???? ????. 
??? ?? ?????? ?????? ? ??????????? ?????????? 
???????? ?????????? ?????????, ??????? ?? ???????????, ???????? ?????? ?????? ????????? ??????? ??? ???????? ???????. ???????? ?????????? ?? ?????? ?????????. 
· ???????, ????? ???????? ???????? ????????. ????? ????, ??????? ?????????? ????????? ? ?????? ?????????. 
? ????, ????????, ???????? ? ??? ???????? ????? 15 ??????? ????????? ? ??????. ??????? ????????? 1 ????? ????? (30 ???????), 1/2 ????? ?????????????? ???????? ??? ??? 1/3 ????? ?????????????? ??????? ??? ????. 
? ?????? ???????? 15 ??????? ????????? ? ??????. ??????? ????????? 1 ????????? ?????? ?????, ???????? ?????? ??? ????????; ½ ??????; ½ ????? ?????????????? ??? ???????????????? ???????; ½ ????? ?????????? ????; 1 ????? ???? ??? ??????; 2 ???????? ????? ???????????. 
? ?????? ? ?????? ??? ?????????? ?????? ???????? 15 ??????? ????????? ? ??????. ??????? ????????? 1 ????? ?????? ??? 2/3 ????? ??????? ??? ?????????? ??????. 
? ???????????? ????? ???????? 15 ??????? ????????? ? ??????. ??????? ????????? ½ ????? ????????????? ???? ??? ???????? ?????????; 1 ????? ????????????? ?????; ½ ?????? ??????? ???????????; ½ ????? ?????????????? ?????; ½ ????? ?????????????? ???????? ??? ???????? ???????. 
· ???????, ??????? ????????? ????? ???? ?????? ???? ? ? ?????? ??????? ????. ???????? ??? ?????????? ?? ????????? ??????? ????? ??????? ??? ??????? ???????????? ?????????? ???????????? ?????????. ??? ?????????? ????????? ?????????. 
· ???? ?? ?? ??????? ? ???????????? ????? ????????? ??????? ?????????, ??????????? ???????? ????? ???????????? ???????. ??? ???????, ??????? ?????? ?????????? ???????????????? ????? ????. ?? ????? ???????? ???????????? ???????? ? ??????? ???. 
? ????????? ??? ?? ??????? ?? ????? ????. ???????? ?????????????? ????? ?? ???????? ???????, ???? ??? ?????? ???????? ????  ?? ???????? ???????, ? ???????? ??? ???????????? ?????  ?? ?????????? ???????? ???????. ? ????? ?????????? ?? ?????? ???????? ????????? ??????? ?????? ? 1 ????? ?????? ??? ??????? ? ??????????? ?? ????, ??????? ????????? ?? ????????????? ?????????? ?? ??????? ????. 
· ?????????? ???? ?????????? ?????????? ????????? ??? ?????? ?????? ????. ?? «????????????» ???? ??????? ????? ?????????, ????? ?????? ?? ?? ???? ?????. 
· ????? ???????? ????? ???? ??? ?????? ?? ???????? ????????? ? ??????. ????????? ?????? ????????: ????????, ??????, ???????, ????, ????, ????, ???, ?????? ? ?????????? ?????. ?????? ?????? ???? ?????????? 90 ???????, ??? ?? ???????? ????????????? ?????? ????. ?????? ??????????? ???? (?????? 30 ??????? ????) ????? ????????? ???????: 1/4 ????? ???????, 1 ????, 1 ???????? ????? ??????????? ????? ? 1/2 ????? ????. 
??? ????? ???????? ??? ???? ? ??? ?? ????? ???????? ???????? ?????? 
· ????????? ?????????? ??????? ??????, ??????? ???????? ????????. ???? ?? ?????? ????????? ???????? ??????? ?????? ????, ??? ????? ????? ??????? ?????? ?????? ??????????? ???. 
· ???? ?????????, ??? ?????, ??????? ?? ????????, ???????? ??????? ????? ????????? (????????, ?????????, ???????? ??? ???????????????? ??????), ????????? ???????? ??? ?????? ? ?????? ??????????? ?????????. ???????? ????? ??? ??????? ?????. 
· ???? ?? ??????? ???????, ?????????? ???? ??????? ??????? ? ????? ?? ? ????? ??? ??? ????, ????? ?????? ???????????, ??????? ????? ?????? ? ???????. 
· ???? ?? ????? ???? ?? ????? ?????? ?????????, ??? ???????????, ???????????? ??? ???????? ??????????. ??? ??????? ??????? ??????? ??????? ? ?????. 
??? ??? ?? ?????? ?????? 
· ?????????? ??????????? ?????????? ?????, ???????? ????? ?? ???? ? ???????? ?????????. ??? ???????? ?????, ??? ??? ? ????? ? ???????? ???????? ??????? ???? ???????? ????????? ???????????. ??????? ????????? ??????? ????? ???? ? ???????? ???????? ? ?????? ??????????? ???? ??? ????????????. ??????????? ????????? ??? ????????? ????? ?????? ?????????? ????? ??? ?????????? ?????????? ?????, ????????????? ?? ????? ???????. 
· ?? ??????????? ?????? ????. ???? ?? ?????????? ????? ???? ? ??????? ??????? ??? ???????????? ????????????? ????????? ??? ??????? ????????? ???????, ??? ??????? ??????? ????? ?????? ??????? ?????. 
· ??????????????????? ? ??????, ?????? ??? ??????????? ????????. ???????? ????? ??????? ??????? ?????? ??????? ? ?????. ????? ????, ???? ?? ?????????? ???????????? ????????????? ????????? ??? ??????? ????????? ???????, ???????? ????? ???????? ??????????????? ???????? ???????. 
??????????? ?????????? ? ????  ?????? ????? ?????? ??????? ? ????? ?????? ????????.??????????? ????????? ?????, ???? ??? ??? ???????? ?????. ??? ????????? ??????? ????????? ?????? ?????. ????????????? ????? ?????? ????????, ??????? ?? ??????????, ? ????? ?? ????? ?????????? ????? ????????. 
??? ????? ???????? ?????????????? ??????????? 
????????? [email or web address] ????? I147 ? ?????? ?????? ?????????????? ?????????? \"??? ????? ????? ? ????????????? ?? ??????? ??? ???????? ???????. - [ Learning About Diabetes Food Guidelines ]. \" 
??????? ?????? ??: ??????? 7, 2017 
?????? ????????: 11.8 © [id number] [employer]wise, Incorporated. ??????????? ??????????, ?????????????? ? ???????????? ? ???????? ???????? ????? ??????. ???? ? ??? ????????? ??????? ? ?????-???? ??????????? ??? ?? ??????????, ?????????? ? ?????? ??????????, ??????? ?? ?????? ?????. ????????  Healthwise, Incorporated ?? ???? ???????? ? ?? ????? ??????? ??????????????? ?? ??????????? ????????????? ?????? ??????????.

## 2018-11-16 LAB
ALBUMIN SERPL-MCNC: 4.3 G/DL (ref 3.5–4.8)
ALBUMIN/CREAT UR: 119.5 MG/G CREAT (ref 0–30)
ALBUMIN/GLOB SERPL: 1.7 {RATIO} (ref 1.2–2.2)
ALP SERPL-CCNC: 106 IU/L (ref 39–117)
ALT SERPL-CCNC: 16 IU/L (ref 0–44)
AST SERPL-CCNC: 16 IU/L (ref 0–40)
BASOPHILS # BLD AUTO: 0 X10E3/UL (ref 0–0.2)
BASOPHILS NFR BLD AUTO: 0 %
BILIRUB SERPL-MCNC: 0.5 MG/DL (ref 0–1.2)
BUN SERPL-MCNC: 20 MG/DL (ref 8–27)
BUN/CREAT SERPL: 18 (ref 10–24)
CALCIUM SERPL-MCNC: 9.9 MG/DL (ref 8.6–10.2)
CHLORIDE SERPL-SCNC: 104 MMOL/L (ref 96–106)
CHOLEST SERPL-MCNC: 142 MG/DL (ref 100–199)
CO2 SERPL-SCNC: 26 MMOL/L (ref 20–29)
CREAT SERPL-MCNC: 1.1 MG/DL (ref 0.76–1.27)
CREAT UR-MCNC: 104.8 MG/DL
EOSINOPHIL # BLD AUTO: 0.1 X10E3/UL (ref 0–0.4)
EOSINOPHIL NFR BLD AUTO: 1 %
ERYTHROCYTE [DISTWIDTH] IN BLOOD BY AUTOMATED COUNT: 15.3 % (ref 12.3–15.4)
EST. AVERAGE GLUCOSE BLD GHB EST-MCNC: 177 MG/DL
GLOBULIN SER CALC-MCNC: 2.6 G/DL (ref 1.5–4.5)
GLUCOSE SERPL-MCNC: 155 MG/DL (ref 65–99)
HBA1C MFR BLD: 7.8 % (ref 4.8–5.6)
HCT VFR BLD AUTO: 44.4 % (ref 37.5–51)
HDLC SERPL-MCNC: 55 MG/DL
HGB BLD-MCNC: 15.1 G/DL (ref 13–17.7)
IMM GRANULOCYTES # BLD: 0 X10E3/UL (ref 0–0.1)
IMM GRANULOCYTES NFR BLD: 0 %
INTERPRETATION, 910389: NORMAL
LDLC SERPL CALC-MCNC: 58 MG/DL (ref 0–99)
LYMPHOCYTES # BLD AUTO: 1.4 X10E3/UL (ref 0.7–3.1)
LYMPHOCYTES NFR BLD AUTO: 19 %
MCH RBC QN AUTO: 27.4 PG (ref 26.6–33)
MCHC RBC AUTO-ENTMCNC: 34 G/DL (ref 31.5–35.7)
MCV RBC AUTO: 80 FL (ref 79–97)
MICROALBUMIN UR-MCNC: 125.2 UG/ML
MONOCYTES # BLD AUTO: 0.6 X10E3/UL (ref 0.1–0.9)
MONOCYTES NFR BLD AUTO: 8 %
NEUTROPHILS # BLD AUTO: 5.3 X10E3/UL (ref 1.4–7)
NEUTROPHILS NFR BLD AUTO: 72 %
PLATELET # BLD AUTO: 169 X10E3/UL (ref 150–379)
POTASSIUM SERPL-SCNC: 4.6 MMOL/L (ref 3.5–5.2)
PROT SERPL-MCNC: 6.9 G/DL (ref 6–8.5)
RBC # BLD AUTO: 5.52 X10E6/UL (ref 4.14–5.8)
SODIUM SERPL-SCNC: 143 MMOL/L (ref 134–144)
TRIGL SERPL-MCNC: 144 MG/DL (ref 0–149)
VLDLC SERPL CALC-MCNC: 29 MG/DL (ref 5–40)
WBC # BLD AUTO: 7.3 X10E3/UL (ref 3.4–10.8)

## 2018-11-17 NOTE — PROGRESS NOTES
Sent in letter: 
Cholesterol, kidney, liver, electrolytes, and blood counts were fine. a1c diabetes test is quite a bit higher but is still good enough. Please just be careful to avoid sweets or excessive carbs in your diet! Going back on the metformin should also help, and perhaps we can wean off the Januvia if labs are better next time.

## 2018-12-14 ENCOUNTER — HOSPITAL ENCOUNTER (OUTPATIENT)
Dept: LAB | Age: 78
Discharge: HOME | End: 2018-12-14
Payer: MEDICARE

## 2018-12-14 PROCEDURE — 36415 COLL VENOUS BLD VENIPUNCTURE: CPT

## 2018-12-14 PROCEDURE — 83036 HEMOGLOBIN GLYCOSYLATED A1C: CPT

## 2018-12-15 LAB
EST. AVERAGE GLUCOSE BLD GHB EST-MCNC: 171 MG/DL
HBA1C MFR BLD: 7.6 % (ref 4.8–5.6)

## 2018-12-17 ENCOUNTER — TELEPHONE (OUTPATIENT)
Dept: FAMILY MEDICINE CLINIC | Age: 78
End: 2018-12-17

## 2018-12-17 NOTE — TELEPHONE ENCOUNTER
----- Message from Iggy Gardner MD sent at 12/15/2018  7:39 AM EST -----  Regarding: wrong lab  Hi MJ, I am not sure what happened, but he got the wrong lab done. An A1c was done, and he needed a BMP (which I had ordered). Maybe there was an old a1c order in the chart? Would you ask him to please come back? Thanks!! Iggy Gardner MD 12/15/2018 7:40 AM       Spoke to wife and asked her to have  come back for labs. She states that they will come tomorrow morning.

## 2018-12-18 DIAGNOSIS — E11.9 CONTROLLED TYPE 2 DIABETES MELLITUS WITHOUT COMPLICATION, WITHOUT LONG-TERM CURRENT USE OF INSULIN (HCC): ICD-10-CM

## 2018-12-19 LAB
BUN SERPL-MCNC: 21 MG/DL (ref 8–27)
BUN/CREAT SERPL: 17 (ref 10–24)
CALCIUM SERPL-MCNC: 9.8 MG/DL (ref 8.6–10.2)
CHLORIDE SERPL-SCNC: 104 MMOL/L (ref 96–106)
CO2 SERPL-SCNC: 24 MMOL/L (ref 20–29)
CREAT SERPL-MCNC: 1.26 MG/DL (ref 0.76–1.27)
GLUCOSE SERPL-MCNC: 149 MG/DL (ref 65–99)
INTERPRETATION: NORMAL
POTASSIUM SERPL-SCNC: 4.2 MMOL/L (ref 3.5–5.2)
SODIUM SERPL-SCNC: 142 MMOL/L (ref 134–144)

## 2018-12-20 NOTE — TELEPHONE ENCOUNTER
Pharm on file verified.  They are requesting a refill on the following meds,       LOV 12/10/18  Last refill. 09/25/18    Requested Prescriptions     Pending Prescriptions Disp Refills    omega-3 acid ethyl esters (LOVAZA) 1 gram capsule 180 Cap 0     Sig: TAKE 2 CAPSULES BY MOUTH EVERY DAY

## 2018-12-21 RX ORDER — OMEGA-3-ACID ETHYL ESTERS 1 G/1
CAPSULE, LIQUID FILLED ORAL
Qty: 180 CAP | Refills: 0 | Status: SHIPPED | OUTPATIENT
Start: 2018-12-21 | End: 2019-03-20 | Stop reason: SDUPTHER

## 2019-01-16 ENCOUNTER — TELEPHONE (OUTPATIENT)
Dept: FAMILY MEDICINE CLINIC | Age: 79
End: 2019-01-16

## 2019-01-16 NOTE — TELEPHONE ENCOUNTER
----- Message from [place] sent at 1/16/2019  2:01 PM EST -----  Regarding: Dr. Liliya Lee calling on behalf of [address] [place] [address] requesting a call back in regards to pt's care coordinator wanting to know if pt's pcp would be able to attend a phone call meeting about pt's care plan/ history. Best contact [phone] opt 2 .

## 2019-01-16 NOTE — TELEPHONE ENCOUNTER
Called Peter back to let her know that MD will be seeing pt's and won't be able to attend the phone conference.

## 2019-02-11 NOTE — TELEPHONE ENCOUNTER
Pharmacy is calling requesting a refill for the patient. .  Requested Prescriptions     Pending Prescriptions Disp Refills    SITagliptin (JANUVIA) 100 mg tablet 90 Tab 1     Sig: Take 1 Tab by mouth daily. [facility] verified        LOV:  Tuesday, December 18, 2018  UOV:  Friday, February 15, 2019

## 2019-02-15 ENCOUNTER — HOSPITAL ENCOUNTER (OUTPATIENT)
Dept: LAB | Age: 79
Discharge: HOME | End: 2019-02-15
Payer: MEDICARE

## 2019-02-15 ENCOUNTER — OFFICE VISIT (OUTPATIENT)
Dept: FAMILY MEDICINE CLINIC | Age: 79
End: 2019-02-15

## 2019-02-15 VITALS
DIASTOLIC BLOOD PRESSURE: 88 MMHG | OXYGEN SATURATION: 94 % | HEART RATE: 59 BPM | RESPIRATION RATE: 18 BRPM | TEMPERATURE: 97 F | WEIGHT: 196 LBS | HEIGHT: 64 IN | SYSTOLIC BLOOD PRESSURE: 160 MMHG | BODY MASS INDEX: 33.46 KG/M2

## 2019-02-15 DIAGNOSIS — R06.83 SNORING: ICD-10-CM

## 2019-02-15 DIAGNOSIS — E11.21 TYPE 2 DIABETES MELLITUS WITH NEPHROPATHY (HCC): Primary | ICD-10-CM

## 2019-02-15 DIAGNOSIS — E66.9 OBESITY (BMI 30.0-34.9): ICD-10-CM

## 2019-02-15 DIAGNOSIS — R51.9 NONINTRACTABLE EPISODIC HEADACHE, UNSPECIFIED HEADACHE TYPE: ICD-10-CM

## 2019-02-15 DIAGNOSIS — E78.2 MIXED HYPERLIPIDEMIA: ICD-10-CM

## 2019-02-15 DIAGNOSIS — I10 ESSENTIAL HYPERTENSION: ICD-10-CM

## 2019-02-15 DIAGNOSIS — Z87.19 HISTORY OF GASTRITIS: ICD-10-CM

## 2019-02-15 PROBLEM — E11.9 CONTROLLED TYPE 2 DIABETES MELLITUS WITHOUT COMPLICATION, WITHOUT LONG-TERM CURRENT USE OF INSULIN (HCC): Status: RESOLVED | Noted: 2018-01-02 | Resolved: 2019-02-15

## 2019-02-15 PROCEDURE — 80048 BASIC METABOLIC PNL TOTAL CA: CPT

## 2019-02-15 PROCEDURE — 83036 HEMOGLOBIN GLYCOSYLATED A1C: CPT

## 2019-02-15 RX ORDER — AMLODIPINE BESYLATE 10 MG/1
10 TABLET ORAL
Qty: 90 TAB | Refills: 1 | COMMUNITY
Start: 2019-02-15 | End: 2019-04-15 | Stop reason: SDUPTHER

## 2019-02-15 RX ORDER — ASPIRIN 81 MG/1
TABLET ORAL
COMMUNITY
Start: 2019-02-15

## 2019-02-15 RX ORDER — LOSARTAN POTASSIUM 100 MG/1
100 TABLET ORAL DAILY
COMMUNITY
Start: 2019-02-15 | End: 2019-04-18 | Stop reason: SDUPTHER

## 2019-02-15 NOTE — PROGRESS NOTES
[facility] [address]. [place], [address] [place] [address] Road 
[phone] Date of visit: 2/15/2019 Subjective:  
  
History obtained from:  Patient, wife. Melissa Cuellar is a 67 y.o. male who presents today for head hurting and bp running higher recently, in 170/90 range at home Has tried to keep salt down in diet Still taking his amlodipine daily and losartan 1/2 pill BID Not taking nsaids He does snore when he sleeps Never had sleep study Wife doesn't hear him stop breathing at all, just snoring, and that is anot all the time Also avoids carbs Checks sugar, was 179 recently Still on metformin and [place] and [place] Taking aspirin daily No hx mi or cva Does have hx gi bleed Patient Active Problem List  
 Diagnosis Date Noted  Obesity (BMI 30.0-34.9) 02/15/2019  Snoring 02/15/2019  Chronic knee pain 11/15/2018  Psoriasis 08/16/2018  Iron deficiency anemia due to chronic blood loss 04/18/2018  History of gastritis 04/18/2018  Heme positive stool 04/18/2018  Chronic ankle pain, bilateral 04/10/2018  Foot pain, bilateral 04/10/2018  Urinary hesitancy 04/10/2018  Elevated serum creatinine 01/06/2018  Type 2 diabetes mellitus with nephropathy ([facility] [address] 01/04/2018  Microalbuminuria 01/04/2018  Chronic foot pain, right 01/02/2018  Essential hypertension 01/02/2018  Mixed hyperlipidemia 01/02/2018 Current Outpatient Medications Medication Sig Dispense Refill  amLODIPine (NORVASC) 10 mg tablet Take 1 Tab by mouth nightly. 90 Tab 1  
 losartan (COZAAR) 100 mg tablet Take 1 Tab by mouth daily. (higher dose)  aspirin delayed-release 81 mg tablet 1 pill Mon, Wed, Fri    
 SITagliptin (JANUVIA) 100 mg tablet Take 1 Tab by mouth daily.  90 Tab 1  
 omega-3 acid ethyl esters (LOVAZA) 1 gram capsule TAKE 2 CAPSULES BY MOUTH EVERY  Cap 0  
 metFORMIN (GLUCOPHAGE) 500 mg tablet Take 1 Tab by mouth two (2) times daily (with meals). 180 Tab 1  
 atorvastatin (LIPITOR) 40 mg tablet Take 1 Tab by mouth daily. Replaces pravastatin 90 Tab 1  
 metoprolol succinate (TOPROL-XL) 25 mg XL tablet Take 1 Tab by mouth daily. 90 Tab 1  
 glucose blood VI test strips (ASCENSIA CONTOUR) strip Per insurance preference, test 1x/d dx diabetes E11.9 contour next test strips 100 Strip 3  
 VIT A/VIT C/VIT E/ZINC/COPPER (OCUVITE PRESERVISION PO) Take 1 Tab by mouth daily. No Known Allergies Past Medical History:  
Diagnosis Date  Diabetes ([address] type 2; niddm  Hypertension  Ill-defined condition HIGH CHOLESTEROL Past Surgical History:  
Procedure Laterality Date  CARDIAC SURG PROCEDURE UNLIST  2018  
 2 stents placed [address]  HX CATARACT REMOVAL Right  HX COLONOSCOPY   COLONOSCOPY  
 HX COLONOSCOPY  2018  
 repeat 3 years  HX ENDOSCOPY  2018  
 h pylori moderate chronic gastritis  HX HEENT Bilateral   
 STRABISMUS CORRECTION Family History Problem Relation Age of Onset  Diabetes Mother  Hypertension Mother  Other Father   
      age 55, maybe stroke  Diabetes Sister  Hypertension Sister  Anesth Problems Neg Hx Social History Tobacco Use  Smoking status: Former Smoker Packs/day: 0.75 Years: 25.00 Pack years: 18.75 Last attempt to quit: 2004 Years since quitting: 15.1  Smokeless tobacco: Never Used Substance Use Topics  Alcohol use: No  
  
Social History Social History Narrative  2 kids and 4 grandkids live closeby Originally from [facility] Review of Systems Card: denies chest pain Pulm: denies shortness of breath Objective:  
 
Vitals:  
 02/15/19 0810 02/15/19 9335 BP: (!) 179/112 160/88 Pulse: (!) 59 Resp: 18 Temp: 97 °F (36.1 °C) TempSrc: Oral   
SpO2: 94% Weight: 196 lb (88.9 kg) Height: 5' 4\" (1.626 m) Body mass index is 33.64 kg/m². General: stated age, obese and in NAD Neck: supple, symmetrical, trachea midline, no adenopathy and thyroid: not enlarged, symmetric, no tenderness/mass/nodules Lungs:  clear to auscultation w/o rales, rhonchi, wheezes w/normal effort and no use of accessory muscles of respiration Heart: regular rate and rhythm, S1, S2 normal, no murmur, click, rub or gallop Neuro: CN 2-12 intact, strength 5/5, biceps/patellar reflexes 1+ bilaterally, sensation intact to light touch bilaterally, gait normal  
Ext:  No edema noted. Lymph: no cervical adenopathy appreciated Skin:  Normal. and no rash or abnormalities Psych: alert and oriented to person, place, time and situation and Speech: appropriate quality, quantity and organization of sentences Assessment/Plan: ICD-10-CM ICD-9-CM 1. Type 2 diabetes mellitus with nephropathy (HCC) W11.43 908.18 METABOLIC PANEL, BASIC  
  583.81 HEMOGLOBIN A1C WITH EAG 2. Mixed hyperlipidemia E78.2 272.2 3. Essential hypertension I10 401.9 4. Nonintractable episodic headache, unspecified headache type R51 784.0   
5. Obesity (BMI 30.0-34. 9) E66.9 278.00   
6. History of gastritis Z87.19 V12.79   
7. Snoring R06.83 786.09 Orders Placed This Encounter  METABOLIC PANEL, BASIC  
 HEMOGLOBIN A1C WITH EAG  
 amLODIPine (NORVASC) 10 mg tablet  losartan (COZAAR) 100 mg tablet  aspirin delayed-release 81 mg tablet Dm fair control Seems to do well with diet, exercise Check labs Tolerating meds, may need higher dose if a1c worse Goal a1c for him less than 8 Declines all vaccines as usual 
 
bp worse recently, not sure why Up losartan I don't see big changes he can make with lifestyle 
encoruaged the continued healthy diet/exercise for weight loss Down 4lb from 6 mo ago With history of gastritis would be good to wean aspirin, will cut back to 3x per week, plan to wean off next visit No definite indication for him to be on it Hope the HA will resolve with treatment of bp, if not he will let me know Does snore but not all the time, wife has not seen any apnea, still may consider sleep study if we can't get bp down Patient Instructions Your blood pressure should be less than 140/90 consistently (more than 90% of the time). Check it after sitting and resting for 10 minutes for an accurate result. Please call me if it is often running high. [phone] Please update me on your blood pressure in about 2 weeks. You can leave a message for my nurse, RUDDY For now, increase your losartan to 100mg every morning Take the amlodipine at bedtime Aspirin should be only 3x per week (Mon, Wed, Fri), or about every other day Discussed the diagnosis and plan and he expressed understanding. Follow-up Disposition: 
Return in about 3 months (around 5/15/2019) for Follow up diabetes, Annual Wellness Visit.  
 
Wendy Krueger MD

## 2019-02-15 NOTE — PROGRESS NOTES
Chief Complaint Patient presents with  Diabetes  
  follow up  Cholesterol Problem  
  follow up 1. Have you been to the ER, urgent care clinic since your last visit? Hospitalized since your last visit? No 
 
2. Have you seen or consulted any other health care providers outside of the [address] [place] [address] since your last visit? Include any pap smears or colon screening.  No

## 2019-02-15 NOTE — PATIENT INSTRUCTIONS
Your blood pressure should be less than 140/90 consistently (more than 90% of the time). Check it after sitting and resting for 10 minutes for an accurate result. Please call me if it is often running high. [phone] Please update me on your blood pressure in about 2 weeks. You can leave a message for my nurse, MJ For now, increase your losartan to 100mg every morning Take the amlodipine at bedtime Aspirin should be only 3x per week (Mon, Wed, Fri), or about every other day Well Visit, Over 72: Care Instructions Your Care Instructions Physical exams can help you stay healthy. Your doctor has checked your overall health and may have suggested ways to take good care of yourself. He or she also may have recommended tests. At home, you can help prevent illness with healthy eating, regular exercise, and other steps. Follow-up care is a key part of your treatment and safety. Be sure to make and go to all appointments, and call your doctor if you are having problems. It's also a good idea to know your test results and keep a list of the medicines you take. How can you care for yourself at home? · Reach and stay at a healthy weight. This will lower your risk for many problems, such as obesity, diabetes, heart disease, and high blood pressure. · Get at least 30 minutes of exercise on most days of the week. Walking is a good choice. You also may want to do other activities, such as running, swimming, cycling, or playing tennis or team sports. · Do not smoke. Smoking can make health problems worse. If you need help quitting, talk to your doctor about stop-smoking programs and medicines. These can increase your chances of quitting for good. · Protect your skin from too much sun. When you're outdoors from 10 a.m. to 4 p.m., stay in the shade or cover up with clothing and a hat with a wide brim. Wear sunglasses that block UV rays.  Even when it's cloudy, put broad-spectrum sunscreen (SPF 30 or higher) on any exposed skin. · See a dentist one or two times a year for checkups and to have your teeth cleaned. · Wear a seat belt in the car. · Limit alcohol to 2 drinks a day for men and 1 drink a day for women. Too much alcohol can cause health problems. Follow your doctor's advice about when to have certain tests. These tests can spot problems early. For men and women · Cholesterol. Your doctor will tell you how often to have this done based on your overall health and other things that can increase your risk for heart attack and stroke. · Blood pressure. Have your blood pressure checked during a routine doctor visit. Your doctor will tell you how often to check your blood pressure based on your age, your blood pressure results, and other factors. · Diabetes. Ask your doctor whether you should have tests for diabetes. · Vision. Experts recommend that you have yearly exams for glaucoma and other age-related eye problems. · Hearing. Tell your doctor if you notice any change in your hearing. You can have tests to find out how well you hear. · Colon cancer tests. Keep having colon cancer tests as your doctor recommends. You can have one of several types of tests. · Heart attack and stroke risk. At least every 4 to 6 years, you should have your risk for heart attack and stroke assessed. Your doctor uses factors such as your age, blood pressure, cholesterol, and whether you smoke or have diabetes to show what your risk for a heart attack or stroke is over the next 10 years. · Osteoporosis. Talk to your doctor about whether you should have a bone density test to find out whether you have thinning bones. Also ask your doctor about whether you should take calcium and vitamin D supplements. For women · Pap test and pelvic exam. You may no longer need a Pap test. Talk with your doctor about whether to stop or continue to have Pap tests. · Breast exam and mammogram. Ask how often you should have a mammogram, which is an X-ray of your breasts. A mammogram can spot breast cancer before it can be felt and when it is easiest to treat. · Thyroid disease. Talk to your doctor about whether to have your thyroid checked as part of a regular physical exam. Women have an increased chance of a thyroid problem. For men · Prostate exam. Talk to your doctor about whether you should have a blood test (called a PSA test) for prostate cancer. Experts disagree on whether men should have this test. Some experts recommend that you discuss the benefits and risks of the test with your doctor. · Abdominal aortic aneurysm. Ask your doctor whether you should have a test to check for an aneurysm. You may need a test if you ever smoked or if your parent, brother, sister, or child has had an aneurysm. When should you call for help? Watch closely for changes in your health, and be sure to contact your doctor if you have any problems or symptoms that concern you. Where can you learn more? Go to http://mack-meeta.info/. Enter D642 in the search box to learn more about \"Well Visit, Over 65: Care Instructions. \" Current as of: March 28, 2018 Content Version: 11.9 © 2195-8998 [employer]. Care instructions adapted under license by [employer] (which disclaims liability or warranty for this information). If you have questions about a medical condition or this instruction, always ask your healthcare professional. [address] any warranty or liability for your use of this information.

## 2019-02-16 LAB
BUN SERPL-MCNC: 19 MG/DL (ref 8–27)
BUN/CREAT SERPL: 17 (ref 10–24)
CALCIUM SERPL-MCNC: 9.5 MG/DL (ref 8.6–10.2)
CHLORIDE SERPL-SCNC: 102 MMOL/L (ref 96–106)
CO2 SERPL-SCNC: 21 MMOL/L (ref 20–29)
CREAT SERPL-MCNC: 1.15 MG/DL (ref 0.76–1.27)
EST. AVERAGE GLUCOSE BLD GHB EST-MCNC: 160 MG/DL
GLUCOSE SERPL-MCNC: 141 MG/DL (ref 65–99)
HBA1C MFR BLD: 7.2 % (ref 4.8–5.6)
POTASSIUM SERPL-SCNC: 4.2 MMOL/L (ref 3.5–5.2)
SODIUM SERPL-SCNC: 139 MMOL/L (ref 134–144)

## 2019-03-20 RX ORDER — OMEGA-3-ACID ETHYL ESTERS 1 G/1
CAPSULE, LIQUID FILLED ORAL
Qty: 180 CAP | Refills: 0 | Status: SHIPPED | OUTPATIENT
Start: 2019-03-20 | End: 2019-05-17

## 2019-03-20 NOTE — TELEPHONE ENCOUNTER
Pharmacy requesting medication refill     Requested Prescriptions     Pending Prescriptions Disp Refills    omega-3 acid ethyl esters (LOVAZA) 1 gram capsule 180 Cap 0     Sig: TAKE 2 CAPSULES BY MOUTH [facility] on file verified  [phone])

## 2019-04-12 RX ORDER — ATORVASTATIN CALCIUM 40 MG/1
40 TABLET, FILM COATED ORAL DAILY
Qty: 90 TAB | Refills: 1 | Status: SHIPPED | OUTPATIENT
Start: 2019-04-12 | End: 2019-10-04 | Stop reason: SDUPTHER

## 2019-04-12 RX ORDER — AMLODIPINE BESYLATE 10 MG/1
10 TABLET ORAL
Qty: 90 TAB | Refills: 1 | Status: CANCELLED | OUTPATIENT
Start: 2019-04-12

## 2019-04-12 RX ORDER — METOPROLOL SUCCINATE 25 MG/1
25 TABLET, EXTENDED RELEASE ORAL DAILY
Qty: 90 TAB | Refills: 1 | Status: SHIPPED | OUTPATIENT
Start: 2019-04-12 | End: 2019-10-04 | Stop reason: SDUPTHER

## 2019-04-12 NOTE — TELEPHONE ENCOUNTER
LOV 2/15/19   UOV 5/17/19    LR amlodipine 2/15/19 90tab 1refill  LR metoprolol 10/9/18 90tabs 1refill  LR atorvastatin 10/9/18 90tab 1refill

## 2019-04-12 NOTE — TELEPHONE ENCOUNTER
Pharmacy requesting medication refill   Requested Prescriptions     Pending Prescriptions Disp Refills    amLODIPine (NORVASC) 10 mg tablet 90 Tab 1     Sig: Take 1 Tab by mouth nightly.  atorvastatin (LIPITOR) 40 mg tablet 90 Tab 1     Sig: Take 1 Tab by mouth daily. Replaces pravastatin    metoprolol succinate (TOPROL-XL) 25 mg XL tablet 90 Tab 1     Sig: Take 1 Tab by mouth daily.            Pharmacy on file verified  [phone])

## 2019-04-15 RX ORDER — AMLODIPINE BESYLATE 10 MG/1
10 TABLET ORAL
Qty: 90 TAB | Refills: 1 | Status: SHIPPED | OUTPATIENT
Start: 2019-04-15 | End: 2019-10-04 | Stop reason: SDUPTHER

## 2019-04-15 NOTE — TELEPHONE ENCOUNTER
Pharmacy requesting medication refill    Medication: Contour Next Test Strips 100's  Test once daily     Requested Prescriptions     Pending Prescriptions Disp Refills    amLODIPine (NORVASC) 10 mg tablet 90 Tab 1     Sig: Take 1 Tab by mouth nightly.        Pharmacy on file verified  [phone])

## 2019-04-16 NOTE — TELEPHONE ENCOUNTER
Pharmacy is requesting for medication refill     Requested Prescriptions     Pending Prescriptions Disp Refills    losartan (COZAAR) 25 mg tablet 90 Tab 1     Sig: Take 1 Tab by mouth daily.      Pharmacy on file verified  [phone])

## 2019-04-17 NOTE — TELEPHONE ENCOUNTER
----- Message from Lennox Garcia sent at 4/17/2019  1:45 PM EDT -----  Regarding: /luc [place], with [facility] requesting a refill for Rx\"Losartan 25 mg\".  Best contact: [phone] or [phone]

## 2019-04-18 RX ORDER — LOSARTAN POTASSIUM 25 MG/1
25 TABLET ORAL DAILY
Qty: 90 TAB | Refills: 1 | Status: CANCELLED | OUTPATIENT
Start: 2019-04-18

## 2019-04-18 RX ORDER — LOSARTAN POTASSIUM 100 MG/1
100 TABLET ORAL DAILY
Qty: 90 TAB | Refills: 0 | Status: SHIPPED | OUTPATIENT
Start: 2019-04-18 | End: 2019-07-18 | Stop reason: SDUPTHER

## 2019-05-09 NOTE — TELEPHONE ENCOUNTER
Pharmacy requesting medication refill     Requested Prescriptions     Pending Prescriptions Disp Refills    metFORMIN (GLUCOPHAGE) 500 mg tablet 180 Tab 1     Sig: Take 1 Tab by mouth two (2) times daily (with meals).      Pharmacy on file verified  [phone])

## 2019-05-10 RX ORDER — METFORMIN HYDROCHLORIDE 500 MG/1
500 TABLET ORAL 2 TIMES DAILY WITH MEALS
Qty: 180 TAB | Refills: 1 | Status: SHIPPED | OUTPATIENT
Start: 2019-05-10 | End: 2019-11-06 | Stop reason: SDUPTHER

## 2019-05-17 ENCOUNTER — OFFICE VISIT (OUTPATIENT)
Dept: FAMILY MEDICINE CLINIC | Age: 79
End: 2019-05-17

## 2019-05-17 ENCOUNTER — HOSPITAL ENCOUNTER (OUTPATIENT)
Dept: LAB | Age: 79
Discharge: HOME | End: 2019-05-17
Payer: MEDICARE

## 2019-05-17 VITALS
DIASTOLIC BLOOD PRESSURE: 72 MMHG | TEMPERATURE: 98.7 F | OXYGEN SATURATION: 96 % | SYSTOLIC BLOOD PRESSURE: 130 MMHG | BODY MASS INDEX: 32.44 KG/M2 | WEIGHT: 190 LBS | HEART RATE: 51 BPM | RESPIRATION RATE: 18 BRPM | HEIGHT: 64 IN

## 2019-05-17 DIAGNOSIS — E66.9 OBESITY (BMI 30.0-34.9): ICD-10-CM

## 2019-05-17 DIAGNOSIS — Z00.00 MEDICARE ANNUAL WELLNESS VISIT, SUBSEQUENT: Primary | ICD-10-CM

## 2019-05-17 DIAGNOSIS — R31.0 GROSS HEMATURIA: ICD-10-CM

## 2019-05-17 DIAGNOSIS — E11.21 TYPE 2 DIABETES MELLITUS WITH NEPHROPATHY (HCC): ICD-10-CM

## 2019-05-17 DIAGNOSIS — N32.89 BLADDER MASS: ICD-10-CM

## 2019-05-17 DIAGNOSIS — I25.10 CORONARY ARTERY DISEASE INVOLVING NATIVE HEART WITHOUT ANGINA PECTORIS, UNSPECIFIED VESSEL OR LESION TYPE: ICD-10-CM

## 2019-05-17 DIAGNOSIS — E78.2 MIXED HYPERLIPIDEMIA: ICD-10-CM

## 2019-05-17 DIAGNOSIS — I10 ESSENTIAL HYPERTENSION: ICD-10-CM

## 2019-05-17 DIAGNOSIS — R80.9 MICROALBUMINURIA: ICD-10-CM

## 2019-05-17 PROBLEM — R19.5 HEME POSITIVE STOOL: Status: RESOLVED | Noted: 2018-04-18 | Resolved: 2019-05-17

## 2019-05-17 PROBLEM — D50.0 IRON DEFICIENCY ANEMIA DUE TO CHRONIC BLOOD LOSS: Status: RESOLVED | Noted: 2018-04-18 | Resolved: 2019-05-17

## 2019-05-17 PROCEDURE — 36415 COLL VENOUS BLD VENIPUNCTURE: CPT

## 2019-05-17 PROCEDURE — 80061 LIPID PANEL: CPT

## 2019-05-17 PROCEDURE — 80053 COMPREHEN METABOLIC PANEL: CPT

## 2019-05-17 PROCEDURE — 83036 HEMOGLOBIN GLYCOSYLATED A1C: CPT

## 2019-05-17 RX ORDER — HYDROCHLOROTHIAZIDE 12.5 MG/1
TABLET ORAL
Refills: 11 | COMMUNITY
Start: 2019-04-30 | End: 2020-11-24

## 2019-05-17 NOTE — PROGRESS NOTES
[address] [place], [address] 
[phone] Date of visit: 5/17/2019 This is a Subsequent Medicare Annual Wellness Visit (AWV), (Performed more than 12 months after effective date of Medicare Part B enrollment and 12 months after last preventive visit) I have reviewed the patient's medical history in detail and updated the computerized patient record. Jonah Chaudhari is a 67 y.o. male History obtained from: patient, wife. Concerns today  
  
-was seen in ER in March for gross hematuria 
-had appt with urologist at [address] but they didn't take his insurance so has not been yet Has only seen a little blood since Urine test was ok other than blood. Cbc ok. See scanned labs 
-CT showed filling defect in bladder right side trigone, possibly a mass or related to prostate 
 
-had a heart test yesterday and was told it was normal. 
-still on aspirin 3 days per week Stomach feels ok Cardiology doesn't want him to stop it completely Feet still hurt at times but not as bad No longer seeing podiatrist, as pt didn't want surgery 
 
-does check sugars, was 159 today Last a1c 7.2 History Patient Active Problem List  
Diagnosis Code  Chronic foot pain, right M79.671, G89.29  
 Essential hypertension I10  
 Mixed hyperlipidemia E78.2  Type 2 diabetes mellitus with nephropathy (HCC) E11.21  
 Microalbuminuria R80.9  Elevated serum creatinine R79.89  Chronic ankle pain, bilateral M25.571, G89.29, M25.572  Foot pain, bilateral M79.671, [id number]  Urinary hesitancy R39.11  
 History of gastritis Z87.19  
 Psoriasis L40.9  Chronic knee pain M25.569, G89.29  
 Obesity (BMI 30.0-34. 9) E66.9  Snoring R06.83  
 Coronary artery disease involving native heart without angina pectoris I25.10  Gross hematuria R31.0  Bladder mass N32.89 Past Medical History:  
Diagnosis Date  Diabetes ([facility] [address] type 2; niddm  Hypertension  Ill-defined condition HIGH CHOLESTEROL Past Surgical History:  
Procedure Laterality Date  CARDIAC SURG PROCEDURE UNLIST  2018  
 2 stents placed [place]  HX CATARACT REMOVAL Right  HX COLONOSCOPY   COLONOSCOPY  
 HX COLONOSCOPY  2018  
 repeat 3 years  HX ENDOSCOPY  2018  
 h pylori moderate chronic gastritis  HX HEENT Bilateral   
 STRABISMUS CORRECTION No Known Allergies Current Outpatient Medications Medication Sig Dispense Refill  hydroCHLOROthiazide (HYDRODIURIL) 12.5 mg tablet TK 1 T PO QD IN THE MORNING  11  
 metFORMIN (GLUCOPHAGE) 500 mg tablet Take 1 Tab by mouth two (2) times daily (with meals). 180 Tab 1  
 losartan (COZAAR) 100 mg tablet Take 1 Tab by mouth daily. (this is correct dose now) 90 Tab 0  
 amLODIPine (NORVASC) 10 mg tablet Take 1 Tab by mouth nightly. 90 Tab 1  
 glucose blood VI test strips (ASCENSIA CONTOUR) strip Per insurance preference, test 1x/d dx diabetes E11.9 contour next test strips 100 Strip 3  
 atorvastatin (LIPITOR) 40 mg tablet Take 1 Tab by mouth daily. Replaces pravastatin 90 Tab 1  
 metoprolol succinate (TOPROL-XL) 25 mg XL tablet Take 1 Tab by mouth daily. 90 Tab 1  
 aspirin delayed-release 81 mg tablet 1 pill Mon, Wed, Fri    
 SITagliptin (JANUVIA) 100 mg tablet Take 1 Tab by mouth daily. 90 Tab 1  VIT A/VIT C/VIT E/ZINC/COPPER (OCUVITE PRESERVISION PO) Take 1 Tab by mouth daily. Family History Problem Relation Age of Onset  Diabetes Mother  Hypertension Mother  Other Father   
      age 55, maybe stroke  Diabetes Sister  Hypertension Sister  Anesth Problems Neg Hx Social History Tobacco Use  Smoking status: Former Smoker Packs/day: 0.75 Years: 25.00 Pack years: 18.75 Last attempt to quit: 2004 Years since quitting: 15.3  Smokeless tobacco: Never Used Substance Use Topics  Alcohol use: No  
 
 
Specialists/Care Team  
Lieutenant Edwards has established care with the following healthcare providers: 
Patient Care Team: 
Cesar Bertrand MD as PCP - General (Family Practice) Daisy Stephen MD (Ophthalmology) Regla Siu MD (Ophthalmology) Pau Rodriguez DPM (Podiatry) Pepe Mittal MD (Orthopedic Surgery) Johanna Kilgore MD (Cardiology) Health Risk Assessment Demographics  
male 
67 y.o. General Health Questions  
-During the past 4 weeks: 
 -how would you rate your health in general? Good 
 -how often have you been bothered by feeling dizzy when standing up? never -how much have you been bothered by bodily pain? moderately 
 -Have you noticed any hearing difficulties? No (wife says a little bit) 
 -has your physical and emotional health limited your social activities with family or friends? no 
 
Emotional Health Questions  
-Do you have a history of depression, anxiety, or emotional problems? no 
 
3 most recent PHQ Screens 5/17/2019 Little interest or pleasure in doing things Not at all Feeling down, depressed, irritable, or hopeless Not at all Total Score PHQ 2 0 Health Habits Please describe your diet habits: eats very well,a voids salt and sugar, drinks water coffee and tea, some diet cola Do you get 5 servings of fruits or vegetables daily? yes Do you exercise regularly? Yes, [employer] 3x per week bike Activities of Daily Living and Functional Status  
-Do you need help with eating, walking, dressing, bathing, toileting, the phone, transportation, shopping, preparing meals, housework, laundry, medications or managing money? no 
-In the past four weeks, was someone available to help you if you needed and wanted help with anything? yes 
-Are you confident are you that you can control and manage most of your health problems?  yes 
-Have you been given information to help you keep track of your medications? yes 
-How often do you have trouble taking your medications as prescribed? never Fall Risk and Home Safety Have you fallen 2 or more times in the past year? no 
Does your home have rugs in the hallway, lack grab bars in the bathroom, lack handrails on the stairs or have poor lighting? They think home is safe Do you have smoke detectors and check them regularly? yes Do you have difficulties driving a car? no 
Do you always fasten your seat belt when you are in a car? yes Review of Systems (if indicated for problems addressed today) Card: denies chest pain Pulm: denies shortness of breath Physical Examination Vitals:  
 05/17/19 6739 05/17/19 1806 BP: (!) 147/104 130/72 Pulse: (!) 51 Resp: 18 Temp: 98.7 °F (37.1 °C) TempSrc: Oral   
SpO2: 96% Weight: 190 lb (86.2 kg) Height: 5' 4\" (1.626 m) Body mass index is 32.61 kg/m². No exam data present Was the patient's timed Up & Go test unsteady or longer than 30 seconds? Yes, hobbles on his feet Evaluation of Cognitive Function Mood/affect:  happy Orientation: normal  
Appearance: age appropriate Family member/caregiver input: wife thinks he is doing well other than pains Additional exam if indicated for problems addressed today: 
General: stated age, well-developed, and in NAD Eyes: PERRL, EOMI, no redness or drainage Nose: no drainage Mouth: no lesions. Upper plate Throat: no erythema, exudate or swelling Neck: supple, symmetrical, trachea midline, no adenopathy and thyroid: not enlarged, symmetric, no tenderness/mass/nodules Lungs:  clear to auscultation w/o rales, rhonchi, wheezes w/normal effort and no use of accessory muscles of respiration Heart: regular rate and rhythm, S1, S2 normal, no murmur, click, rub or gallop Abdomen: soft, nontender Ext:  No edema noted. Normal sensation bilaterally to monofilament; no callouses or deformities; 2+ DP pulses bilaterally Lymph: no cervical adenopathy appreciated Skin:  Normal. and no rash or abnormalities Neuro: normal gait, CN 2-12 intact Psych: alert and oriented to person, place, time and situation and Speech: appropriate quality, quantity and organization of sentences (speaks in [place]) and normal affect Advice/Referrals/Counseling (as indicated) Education and counseling provided for any problems identified above: diet, exercise Preventive Services Health Maintenance Topic Date Due  Pneumococcal 65+ years (1 of 2 - PCV13) 02/14/2006  
 FOOT EXAM Q1  04/10/2019  MEDICARE YEARLY EXAM  04/11/2019  Shingrix Vaccine Age 50> (1 of 2) 11/15/2019 (Originally 2/14/1991)  Influenza Age 5 to Adult  08/01/2019  
 HEMOGLOBIN A1C Q6M  08/15/2019  MICROALBUMIN Q1  11/15/2019  LIPID PANEL Q1  11/15/2019  GLAUCOMA SCREENING Q2Y  01/14/2021  
 EYE EXAM RETINAL OR DILATED  01/14/2021  
 DTaP/Tdap/Td series (2 - Td) 08/06/2025 (Preventive care checklist to be included in patient instructions) Discussed today Done Previously Not Needed X declines   Pneumococcal vaccines X declines   Flu vaccine X declines   Hepatitis B vaccine (if at risk) X declines   Shingles vaccine X declines   TDAP vaccine  
  x Digital rectal exam  
 x  PSA X 5 years ago, bleeding  Colorectal cancer screening  
  x Low-dose CT for lung cancer screening  
  x Bone density test  
 x  Glaucoma screening  
 x  Cholesterol test  
 x  Diabetes screening test   
 x  Diabetes self-management class  
  x Nutritionist referral for diabetes or renal disease Discussion of Advance Directive Discussed with Randy Machuca his ability to prepare and advance directive in the case that an injury or illness causes him to be unable to make health care decisions. Sent home with [employer] folder last year. This year not interested in making one. Assessment/Plan  
Z00.00 ICD-10-CM ICD-9-CM 1. Medicare annual wellness visit, subsequent Z00.00 V70.0 2. Type 2 diabetes mellitus with nephropathy (HCC) I27.63 155.78 METABOLIC PANEL, COMPREHENSIVE  
  583.81 LIPID PANEL  
   HEMOGLOBIN A1C WITH EAG  
    DIABETES FOOT EXAM  
3. Obesity (BMI 30.0-34. 9) E66.9 278.00   
4. Essential hypertension I10 401.9 5. Mixed hyperlipidemia E78.2 272.2 6. Microalbuminuria R80.9 791.0 7. Gross hematuria R31.0 599.71 REFERRAL TO UROLOGY 8. Bladder mass N32.89 596.89 REFERRAL TO UROLOGY 9. Coronary artery disease involving native heart without angina pectoris, unspecified vessel or lesion type I25.10 414.01 Orders Placed This Encounter  METABOLIC PANEL, COMPREHENSIVE  LIPID PANEL  
 HEMOGLOBIN A1C WITH EAG  
 REFERRAL TO UROLOGY  
  DIABETES FOOT EXAM  
 hydroCHLOROthiazide (HYDRODIURIL) 12.5 mg tablet Preventive care as above Refer different urologist about the gross hematuria and bladder mass See scanned ct/labs from ER 
DM doing very well in recent months and has lost weight Encouraged him to keep up the great work with healthy lifestyle, weight loss, exercise Saw cardiologist yesterday and had good report but told to stay on aspirin 3x per week despite history of gastritis (because he has stents) Follow-up and Dispositions · Return in about 6 months (around 11/17/2019) for Follow up.  
  
 
 
Deyanira Donald MD

## 2019-05-17 NOTE — PATIENT INSTRUCTIONS
????????? ???????? ????? 66 ???: ??????????? ?????????? - [ Well Visit, Over 72: Care Instructions ] ?????????? ?? ??????? ????? 
??????? ? ????? ????? ?????? ?????????? ????????. ???? ???????? ????? ????????? ?????? ???????? ? ????? ???? ??????????? ???????????? ?? ?????? ?????. ????? ????, ???? ????? ????????? ???????. ??? ???????????? ??????????? ????????? ?????????, ????????? ??????????? ??????????? ???????????? ? ?????????? ???? ????. 
??????????? ?????????? ? ????  ?????? ????? ?????? ??????? ? ????? ?????? ????????.??????????? ????????? ?????, ???? ??? ??? ???????? ?????. ??? ????????? ??????? ????????? ?????? ?????. ????????????? ????? ?????? ????????, ??????? ?? ??????????, ? ????? ?? ????? ?????????? ????? ????????. 
??? ???????? ? ???????? ????????? 
· ???????????? ???? ??? ? ????????????? ???. ??? ?????? ???? ?????? ???????????, ???????? ????????, ????????? ???????, ??????????? ?????? ? ????????? ????????????? ????????. 
· ?????????? ?????????? ??????????? ???????????? ?? ????? ??? ?? 30 ????? ? ????, ?????????? ?????????. ??? ????? ????? ??????? ??????. ???????? ? ?????? ???? ?????????? ??????????, ???????? ???, ????????, ???? ?? ??????????, ?????? ??? ????????? ???? ??????. 
· ?? ??????. ??????? ????? ??????? ???????? ?? ?????????. ???? ?? ????????? ??????? ?????? ? ??? ????????? ??????, ???????? ?????? ????? ? ?????????? ? ??????????, ??????? ??????? ?????????? ?? ???? ????????. ??? ???????? ????? ???????? ???? ????? ???????? ??????? ??????. 
· ?????? ???????? ?????????????? ???? ?? ??????? ????, ???????? ??? ?????????? ?????. ??????????? ??????????? ?????????????? ????? ???????? ??????? ? ???????????? ???????? (SPF) 30 ? ????. ??????????? ?? ?????? ????, ???? ???? ????????. 
· ???? ??? ??? ???? ? ??? ?????????? ???????? ??????????? ??? ????????????????? ??????? ? ????????????? ?????? ?????. 
· ? ?????????? ??????????????? ?????? ????????????. 
 · ?????????? ???????????? ????????: ?? ????? 2 ????? ???????? ? ???? ??? ?????? ? ?? ????? 1 ????? ??? ??????. ??????? ??????? ????? ?????????????? ???????? ????? ???????? ? ????????????? ??????? ?? ?????????. 
?????????? ???????????? ????? ?? ????? ???????? ? ??????????? ?????? ????????????. ??? ???????????? ???????? ??????? ??????????? ?? ?????? ??????. 
??? ?????? ? ?????? 
· ??????????. ??? ???? ????????? ???, ??? ????? ??? ?????? ? ??????????? ?? ?????? ????????? ???????? ? ?????? ????????, ??????? ????? ???????? ???? ???????? ? ????????. 
· ???????????? ????????. ????????? ???? ???????????? ???????? ?? ????? ????????????????? ???????. ??? ???? ????????? ???, ??? ????? ??????? ????????? ???????????? ???????? ? ??????????? ?? ????????, ??????????? ?????? ????????????? ???????? ? ?????? ????????. 
· ???????? ??????. ??????? ? ?????, ??????? ?? ??? ????? ??????? ??? ????????? ????????? ???????. 
· ??????. ???????? ??????????? ????????? ????????? ???????????? ??? ????????? ???????? ? ?????? ?????????? ??????????? ????. 
· ????. ???????? ?????, ???? ?? ???????? ? ???? ?????-???? ????????? ?????. ?? ?????? ?????? ????????? ??? ??????????? ????, ????????? ?????? ?? ???????. 
· ???????????? ??? ????????? ???? ???????? ?????????. ????????? ???????????? ??? ????????? ???? ???????? ????????? ? ???????????? ? ?????????????? ?????? ?????. ????? ????????? ???? ?? ?????????? ????? ????????????. 
· ???? ???????? ? ????????. ?? ???? ?????? ???? ? 46 ??? ??????? ????????? ?????? ????? ???????? ???????? ? ????????. ???? ?????????? ????? ???????, ??? ??? ???????, ???????????? ????????, ??????????, ??????? ??? ???????? ??????, ??? ?????? ????? ???????? ???????? ??? ???????? ? ??????? ????????? 10 ???. 
· ??????????. ?????????? ? ?????? ? ???, ??????? ?? ??? ????????? ???????????? ?? ??????????? ????????? ?????? ??? ????????? ?? ??????????. ????? ??????? ? ?????, ???? ?? ??? ????????? ??????? ??????? ? ???????? ? ????????? D. 
 ??? ?????? 
· ????? ? ????? ????? ? ???????????????? ????????????. ????????, ??? ?????? ?? ???? ??????? ????? ?? ????? ?????. ?????????? ?? ????? ??????, ??????? ?? ??? ?????????? ??? ?????????? ??????? ????? ?? ????? ?????. 
· ???????????? ???????? ????? ? ???????????. ???????, ??? ????? ??? ??????? ????????? ???????????, ??????? ???????? ?????????????????? ????????????? ???????? ?????. ??????????? ????? ??????? ??? ???????? ?????? ?? ????, ??? ??? ????? ?????????????, ????? ??? ????? ????? ????????. 
· ??????????? ?????????? ??????. ?????????? ? ?????? ? ???, ??????? ?? ??? ????????? ?????????? ?????? ? ?????? ??????????? ???????????? ???????. ? ?????? ?????????? ???? ???????? ??????????? ?????????? ??????. 
??? ?????? 
· ???????????? ?????????????? ??????. ??????? ? ???????? ?????, ??????? ?? ??? ??????? ?????? ????? ?? ?????? ???? ?????????????? ??????  ???????????????????? ??????? (???). ??????????? ?????????? ?? ??????, ????? ?? ???????? ??????????? ??????? ???? ??????. ????????? ???????? ??????????? ???????? ???????????? ? ????? ????? ??????? ?? ????? ??????. 
· ????????? ??????? ?????. ??????? ? ???????? ?????, ??????? ?? ??? ?????? ???????????? ??? ????????? ?????????. ??? ????? ????????????? ????????????, ???? ?? ?????-?????? ?????? ??? ? ?????? ????????, ?????, ?????? ??? ??????? ???? ?????????. 
????? ????? ?????????? ?? ???????? 
??????????? ?????????? ?? ?????????? ?????? ???????? ? ??????????? ??????????? ? ????? ??? ????????? ????? ??? ??????????? ??? ?????????. 
??? ????? ???????? ?????????????? ??????????? 
????????? [email or web address] ????? [id number] ? ?????? ?????? ?????????????? ?????????? \"????????? ???????? ????? 66 ???: ??????????? ?????????? - [ Well Visit, Over 72: Care Instructions ]. \" 
??????? ?????? ??: ????? 28, 2018 
?????? ????????: 11.9 © [id number] [employer].  ??????????? ??????????, ?????????????? ? ???????????? ? ???????? ???????? ????? ??????. ???? ? ??? ????????? ??????? ? ?????-???? ??????????? ??? ?? ??????????, ?????????? ? ?????? ??????????, ??????? ?? ?????? ?????. ???????? [employer] ?? ???? ???????? ? ?? ????? ??????? ??????????????? ?? ??????????? ????????????? ?????? ??????????. Discussed today Done Previously Not Needed X declines   Pneumococcal vaccines X declines   Flu vaccine X declines   Hepatitis B vaccine (if at risk) X declines   Shingles vaccine X declines   TDAP vaccine  
  x Digital rectal exam  
 x  PSA X 5 years ago, bleeding  Colorectal cancer screening  
  x Low-dose CT for lung cancer screening  
  x Bone density test  
 x  Glaucoma screening  
 x  Cholesterol test  
 x  Diabetes screening test   
 x  Diabetes self-management class  
  x Nutritionist referral for diabetes or renal disease

## 2019-05-17 NOTE — PROGRESS NOTES
Chief Complaint Patient presents with [facility] Annual Wellness Visit   Diabetes  
  follow up 1. Have you been to the ER, urgent care clinic since your last visit? Hospitalized since your last visit? No 
 
2. Have you seen or consulted any other health care providers outside of the [address] [place] [address] since your last visit? Include any pap smears or colon screening.  No

## 2019-05-17 NOTE — LETTER
5/17/2019 9:57 AM 
 
RE:    Keke Rucker [address] [facility] [phone] Dear Dr. Acevedo Better, Thank you for agreeing to see [facility] I am referring my patient to you for evaluation of gross hematuria on and off since March. He was seen in [address] [place] [address] ER in March and had a CT showing a likely bladder mass. He has not yet had cystoscopy. He has had mild prostate symptoms in the past as well. Here is his last psa: 
 
Lab Results Component Value Date/Time  
 Prostate Specific Ag 3.7 01/02/2018 11:41 AM  
  
I appreciate your assistance in Mr. Can's care  and look forward to your findings and recommendations. Sincerely, Everton Pete MD

## 2019-05-18 DIAGNOSIS — R79.89 ELEVATED SERUM CREATININE: Primary | ICD-10-CM

## 2019-05-18 LAB
ALBUMIN SERPL-MCNC: 4.2 G/DL (ref 3.5–4.8)
ALBUMIN/GLOB SERPL: 1.5 {RATIO} (ref 1.2–2.2)
ALP SERPL-CCNC: 95 IU/L (ref 39–117)
ALT SERPL-CCNC: 19 IU/L (ref 0–44)
AST SERPL-CCNC: 13 IU/L (ref 0–40)
BILIRUB SERPL-MCNC: 0.3 MG/DL (ref 0–1.2)
BUN SERPL-MCNC: 30 MG/DL (ref 8–27)
BUN/CREAT SERPL: 20 (ref 10–24)
CALCIUM SERPL-MCNC: 10.1 MG/DL (ref 8.6–10.2)
CHLORIDE SERPL-SCNC: 102 MMOL/L (ref 96–106)
CHOLEST SERPL-MCNC: 154 MG/DL (ref 100–199)
CO2 SERPL-SCNC: 21 MMOL/L (ref 20–29)
CREAT SERPL-MCNC: 1.51 MG/DL (ref 0.76–1.27)
EST. AVERAGE GLUCOSE BLD GHB EST-MCNC: 148 MG/DL
GLOBULIN SER CALC-MCNC: 2.8 G/DL (ref 1.5–4.5)
GLUCOSE SERPL-MCNC: 131 MG/DL (ref 65–99)
HBA1C MFR BLD: 6.8 % (ref 4.8–5.6)
HDLC SERPL-MCNC: 55 MG/DL
INTERPRETATION, 910389: NORMAL
INTERPRETATION: NORMAL
LDLC SERPL CALC-MCNC: 62 MG/DL (ref 0–99)
PDF IMAGE, 910387: NORMAL
POTASSIUM SERPL-SCNC: 4 MMOL/L (ref 3.5–5.2)
PROT SERPL-MCNC: 7 G/DL (ref 6–8.5)
SODIUM SERPL-SCNC: 139 MMOL/L (ref 134–144)
TRIGL SERPL-MCNC: 185 MG/DL (ref 0–149)
VLDLC SERPL CALC-MCNC: 37 MG/DL (ref 5–40)

## 2019-05-18 NOTE — PROGRESS NOTES
Sent in letter: 
Sugar and cholesterol tests were great! Very good control! Liver and electrolytes were fine. Kidney test was elevated this time, meaning you were likely dehydrated. Could you stop by the office in about 1-2 weeks to recheck it? There is no need to fast, and you don't need an appointment. Just tell the ladies at the  that you are there only for the lab.

## 2019-06-10 PROBLEM — R31.0 GROSS HEMATURIA: Status: RESOLVED | Noted: 2019-05-17 | Resolved: 2019-06-10

## 2019-06-10 PROBLEM — C67.9 BLADDER CANCER (HCC): Status: ACTIVE | Noted: 2019-06-10

## 2019-06-10 PROBLEM — N32.89 BLADDER MASS: Status: RESOLVED | Noted: 2019-05-17 | Resolved: 2019-06-10

## 2019-07-18 NOTE — TELEPHONE ENCOUNTER
Pharmacy is requesting a 90 day supply for the medication  .   Requested Prescriptions     Pending Prescriptions Disp Refills    losartan (COZAAR) 100 mg tablet 90 Tab 0     Sig: Take 1 Tab by mouth daily. (this is correct dose now)     Last refill : 4/18/2019  LOV :May 17, 2019   Pharmacy verified

## 2019-07-19 RX ORDER — LOSARTAN POTASSIUM 100 MG/1
100 TABLET ORAL DAILY
Qty: 90 TAB | Refills: 1 | Status: SHIPPED | OUTPATIENT
Start: 2019-07-19 | End: 2020-01-06

## 2019-08-12 NOTE — TELEPHONE ENCOUNTER
Pharmacy requesting medication refill     Requested Prescriptions     Pending Prescriptions Disp Refills    SITagliptin (JANUVIA) 100 mg tablet 90 Tab 1     Sig: Take 1 Tab by mouth daily.      Pharmacy on file verified  [phone])

## 2019-08-12 NOTE — TELEPHONE ENCOUNTER
Last seen 5/17/19. I called pt and spoke to wife let her know that pt needs to come for f/u labs. She said that she will come in tomorrow. Refill done per verbal order from Dr Alen Saint.

## 2019-08-13 ENCOUNTER — HOSPITAL ENCOUNTER (OUTPATIENT)
Dept: LAB | Age: 79
Discharge: HOME | End: 2019-08-13
Payer: MEDICARE

## 2019-08-13 ENCOUNTER — LAB ONLY (OUTPATIENT)
Dept: FAMILY MEDICINE CLINIC | Age: 79
End: 2019-08-13

## 2019-08-13 DIAGNOSIS — R79.89 ELEVATED SERUM CREATININE: ICD-10-CM

## 2019-08-13 PROCEDURE — 80048 BASIC METABOLIC PNL TOTAL CA: CPT

## 2019-08-14 LAB
BUN SERPL-MCNC: 26 MG/DL (ref 8–27)
BUN/CREAT SERPL: 22 (ref 10–24)
CALCIUM SERPL-MCNC: 9.9 MG/DL (ref 8.6–10.2)
CHLORIDE SERPL-SCNC: 105 MMOL/L (ref 96–106)
CO2 SERPL-SCNC: 21 MMOL/L (ref 20–29)
CREAT SERPL-MCNC: 1.2 MG/DL (ref 0.76–1.27)
GLUCOSE SERPL-MCNC: 149 MG/DL (ref 65–99)
INTERPRETATION: NORMAL
POTASSIUM SERPL-SCNC: 4.2 MMOL/L (ref 3.5–5.2)
SODIUM SERPL-SCNC: 142 MMOL/L (ref 134–144)

## 2019-10-04 RX ORDER — METOPROLOL SUCCINATE 25 MG/1
25 TABLET, EXTENDED RELEASE ORAL DAILY
Qty: 90 TAB | Refills: 1 | Status: SHIPPED | OUTPATIENT
Start: 2019-10-04 | End: 2020-04-06

## 2019-10-04 RX ORDER — ATORVASTATIN CALCIUM 40 MG/1
40 TABLET, FILM COATED ORAL DAILY
Qty: 90 TAB | Refills: 1 | Status: SHIPPED | OUTPATIENT
Start: 2019-10-04 | End: 2020-04-06

## 2019-10-04 RX ORDER — AMLODIPINE BESYLATE 10 MG/1
10 TABLET ORAL
Qty: 90 TAB | Refills: 1 | Status: SHIPPED | OUTPATIENT
Start: 2019-10-04 | End: 2020-04-06

## 2019-10-04 NOTE — TELEPHONE ENCOUNTER
Pharmacy is requesting medication refill   Requested Prescriptions     Pending Prescriptions Disp Refills    amLODIPine (NORVASC) 10 mg tablet 90 Tab 1     Sig: Take 1 Tab by mouth nightly.  metoprolol succinate (TOPROL-XL) 25 mg XL tablet 90 Tab 1     Sig: Take 1 Tab by mouth daily.  atorvastatin (LIPITOR) 40 mg tablet 90 Tab 1     Sig: Take 1 Tab by mouth daily.  Replaces pravastatin     Pharmacy on file verified  [phone])

## 2019-10-04 NOTE — TELEPHONE ENCOUNTER
PCP: Betzy Pozo MD    Last appt: 5/17/2019  Future Appointments   Date Time Provider Jasvir Guaman   11/21/2019  8:00 AM [facility]       Requested Prescriptions     Pending Prescriptions Disp Refills    amLODIPine (NORVASC) 10 mg tablet 90 Tab 1     Sig: Take 1 Tab by mouth nightly.  metoprolol succinate (TOPROL-XL) 25 mg XL tablet 90 Tab 1     Sig: Take 1 Tab by mouth daily.  atorvastatin (LIPITOR) 40 mg tablet 90 Tab 1     Sig: Take 1 Tab by mouth daily.  Replaces pravastatin

## 2019-11-05 ENCOUNTER — OFFICE VISIT (OUTPATIENT)
Dept: FAMILY MEDICINE CLINIC | Age: 79
End: 2019-11-05

## 2019-11-05 VITALS
SYSTOLIC BLOOD PRESSURE: 129 MMHG | HEIGHT: 64 IN | BODY MASS INDEX: 31.51 KG/M2 | RESPIRATION RATE: 17 BRPM | OXYGEN SATURATION: 94 % | HEART RATE: 60 BPM | DIASTOLIC BLOOD PRESSURE: 68 MMHG | TEMPERATURE: 97.8 F | WEIGHT: 184.6 LBS

## 2019-11-05 DIAGNOSIS — R11.0 NAUSEA: ICD-10-CM

## 2019-11-05 DIAGNOSIS — H05.20 EXOPHTHALMUS: ICD-10-CM

## 2019-11-05 DIAGNOSIS — C67.9 MALIGNANT NEOPLASM OF URINARY BLADDER, UNSPECIFIED SITE (HCC): ICD-10-CM

## 2019-11-05 DIAGNOSIS — I10 ESSENTIAL HYPERTENSION: ICD-10-CM

## 2019-11-05 DIAGNOSIS — I25.10 CORONARY ARTERY DISEASE INVOLVING NATIVE HEART WITHOUT ANGINA PECTORIS, UNSPECIFIED VESSEL OR LESION TYPE: ICD-10-CM

## 2019-11-05 DIAGNOSIS — E11.21 TYPE 2 DIABETES MELLITUS WITH NEPHROPATHY (HCC): Primary | ICD-10-CM

## 2019-11-05 DIAGNOSIS — E66.9 OBESITY (BMI 30.0-34.9): ICD-10-CM

## 2019-11-05 DIAGNOSIS — E78.2 MIXED HYPERLIPIDEMIA: ICD-10-CM

## 2019-11-05 RX ORDER — ONDANSETRON 4 MG/1
4 TABLET, ORALLY DISINTEGRATING ORAL
Qty: 20 TAB | Refills: 2 | Status: SHIPPED | OUTPATIENT
Start: 2019-11-05 | End: 2021-11-19

## 2019-11-05 RX ORDER — PANTOPRAZOLE SODIUM 40 MG/1
40 TABLET, DELAYED RELEASE ORAL DAILY
Qty: 30 TAB | Refills: 1 | Status: SHIPPED | OUTPATIENT
Start: 2019-11-05 | End: 2019-11-06 | Stop reason: SDUPTHER

## 2019-11-05 NOTE — LETTER
11/5/2019 2:36 PM 
 
RE:    [facility] [address] [phone] Dear Dr. Earnestine Sherman, Thank you for agreeing to see [facility] I am referring my patient to you for evaluation of constant nausea and a feeling in his throat for 2 weeks, with weight loss of 12 pounds since Feb, 6 pounds since May (somewhat intentional). He had the h pylori gastritis in 2/18, and I am concerned it may be back. I put him on protonix 40mg daily and zofran prn. . Please see his 
pertinent patient information below. I appreciate your assistance in Mr. Avi Lanes care  and look forward to your findings and recommendations. Sincerely, Terrance Lopez MD

## 2019-11-05 NOTE — PROGRESS NOTES
Chief Complaint   Patient presents with    Diabetes     follow up     Hypertension     follow up    Obesity     follow up      1. Have you been to the ER, urgent care clinic since your last visit? Hospitalized since your last visit? No    2. Have you seen or consulted any other health care providers outside of the [address] [place] [address] since your last visit? Include any pap smears or colon screening.  No

## 2019-11-05 NOTE — PROGRESS NOTES
Abdelrahman Briggs [facility]  [address] [place], [address]  [phone]             Date of visit: 11/5/2019   Subjective:      History obtained from:  Patient, wife. Eugene Kimball is a 67 y.o. male who presents today for follow up     However is not feeling well  Nausea for 2 weeks  Has tried some different foods but not helping  Is nauseated most of the day, no actually vomiting  Little pain 2-3/10 but mostly discomfort  Does feel food coming up in his throat and feels pain in throat    Diabetes good, today 147, sometimes 170-180  No lows    Bladder cancer doing well, had clear cystoscopy on 10/29    Hypertension doing well, don't check it at home often    Saw eye doc recently  Left eye moving forward (exophthalmous)  Referred him to a different doctor in Dec    Obesity improved, had tried but also not eating much due to nausea    Known CAD and sees cardiology regularly    Chronic knee pain improved, perhaps due to 12lb weight loss since Feb?      Patient Active Problem List    Diagnosis Date Noted    Bladder cancer ([facility] [address] 06/10/2019    Coronary artery disease involving native heart without angina pectoris 05/17/2019    Obesity (BMI 30.0-34.9) 02/15/2019    Snoring 02/15/2019    Chronic knee pain 11/15/2018    Psoriasis 08/16/2018    History of gastritis 04/18/2018    Chronic ankle pain, bilateral 04/10/2018    Foot pain, bilateral 04/10/2018    Urinary hesitancy 04/10/2018    Elevated serum creatinine 01/06/2018    Type 2 diabetes mellitus with nephropathy ([facility] [address] 01/04/2018    Microalbuminuria 01/04/2018    Chronic foot pain, right 01/02/2018    Essential hypertension 01/02/2018    Mixed hyperlipidemia 01/02/2018     Current Outpatient Medications   Medication Sig Dispense Refill    pantoprazole (PROTONIX) 40 mg tablet Take 1 Tab by mouth daily.  30 Tab 1    ondansetron (ZOFRAN ODT) 4 mg disintegrating tablet Take 1 Tab by mouth every eight (8) hours as needed for Nausea. 20 Tab 2    amLODIPine (NORVASC) 10 mg tablet Take 1 Tab by mouth nightly. 90 Tab 1    metoprolol succinate (TOPROL-XL) 25 mg XL tablet Take 1 Tab by mouth daily. 90 Tab 1    atorvastatin (LIPITOR) 40 mg tablet Take 1 Tab by mouth daily. Replaces pravastatin 90 Tab 1    SITagliptin (JANUVIA) 100 mg tablet Take 1 Tab by mouth daily. 90 Tab 0    losartan (COZAAR) 100 mg tablet Take 1 Tab by mouth daily. (this is correct dose now) 90 Tab 1    hydroCHLOROthiazide (HYDRODIURIL) 12.5 mg tablet TK 1 T PO QD IN THE MORNING  11    metFORMIN (GLUCOPHAGE) 500 mg tablet Take 1 Tab by mouth two (2) times daily (with meals). 180 Tab 1    glucose blood VI test strips (ASCENSIA CONTOUR) strip Per insurance preference, test 1x/d dx diabetes E11.9 contour next test strips 100 Strip 3    aspirin delayed-release 81 mg tablet 1 pill Mon, Wed, Fri      VIT A/VIT C/VIT E/ZINC/COPPER (OCUVITE PRESERVISION PO) Take 1 Tab by mouth daily.        No Known Allergies  Past Medical History:   Diagnosis Date    Diabetes ([facility] [address]     type 2; niddm    Hypertension     Ill-defined condition     HIGH CHOLESTEROL     Past Surgical History:   Procedure Laterality Date    CARDIAC SURG PROCEDURE UNLIST  2018    2 stents placed     HX APPENDECTOMY      HX CATARACT REMOVAL Right     HX COLONOSCOPY      COLONOSCOPY    HX COLONOSCOPY  2018    repeat 3 years    HX ENDOSCOPY  2018    h pylori moderate chronic gastritis    HX HEENT Bilateral     STRABISMUS CORRECTION    HX UROLOGICAL  10/31/2019    normal cystosocpy     Family History   Problem Relation Age of Onset    Diabetes Mother     Hypertension Mother     Other Father          age 55, [de-identified] stroke    Diabetes Sister     Hypertension Sister     Anesth Problems Neg Hx      Social History     Tobacco Use    Smoking status: Former Smoker     Packs/day: 0.75     Years: 25.00     Pack years: 18.75     Last attempt to quit: 2004     Years since quitting: 15.8    Smokeless tobacco: Never Used   Substance Use Topics    Alcohol use: No      Social History     Social History Narrative        2 kids and 4 grandkids live closeby    Originally from [facility] 66: denies chest pain  Pulm: denies shortness of breath   GI: denies hematochezia        Objective:     Vitals:    11/05/19 1420   BP: 129/68   Pulse: 60   Resp: 17   Temp: 97.8 °F (36.6 °C)   TempSrc: Oral   SpO2: 94%   Weight: 184 lb 9.6 oz (83.7 kg)   Height: 5' 4\" (1.626 m)     Body mass index is 31.69 kg/m². General: stated age, well developed, well nourished and in NAD  Neck: supple, symmetrical, trachea midline, no adenopathy and thyroid: not enlarged, symmetric, no tenderness/mass/nodules  Lungs:  clear to auscultation w/o rales, rhonchi, wheezes w/normal effort and no use of accessory muscles of respiration   Heart: regular rate and rhythm, S1, S2 normal, no murmur, click, rub or gallop  Abdomen: soft, nontender, no masses  Ext:  No edema noted. Lymph: no cervical adenopathy appreciated  Skin:  Normal. and no rash or abnormalities   Psych: alert and oriented to person, place, time and situation and Speech: appropriate quality, quantity and organization of sentences     Assessment/Plan:       ICD-10-CM ICD-9-CM    1. Type 2 diabetes mellitus with nephropathy (HCC) E11.21 250.40 CBC WITH AUTOMATED DIFF     205.54 METABOLIC PANEL, COMPREHENSIVE      LIPID PANEL      HEMOGLOBIN A1C WITH EAG      MICROALBUMIN, UR, RAND W/ MICROALB/CREAT RATIO   2. Obesity (BMI 30.0-34. 9) E66.9 278.00    3. Mixed hyperlipidemia E78.2 272.2    4. Coronary artery disease involving native heart without angina pectoris, unspecified vessel or lesion type I25.10 414.01    5. Essential hypertension I10 401.9    6. Malignant neoplasm of urinary bladder, unspecified site (HCC) C67.9 188.9    7. Exophthalmus H05.20 376.30 TSH 3RD GENERATION      T4, FREE   8.  Nausea R11.0 787.02 REFERRAL TO GASTROENTEROLOGY        Orders Placed This Encounter    CBC WITH AUTOMATED DIFF    METABOLIC PANEL, COMPREHENSIVE    LIPID PANEL    HEMOGLOBIN A1C WITH EAG    MICROALBUMIN, UR, RAND W/ MICROALB/CREAT RATIO    TSH 3RD GENERATION    T4, FREE    REFERRAL TO GASTROENTEROLOGY    pantoprazole (PROTONIX) 40 mg tablet    ondansetron (ZOFRAN ODT) 4 mg disintegrating tablet       Nausea may be recurrence of h pylori gastritis  Due to weight loss will refer for EGD rather than treating again with abx  Put on protonix for now  Also zofran prn    Labs for DM which is usually well-controlled  Other problems stable, doing well    Discussed the diagnosis and plan and he expressed understanding. Follow-up and Dispositions    · Return in about 6 months (around 5/5/2020) for Annual Wellness Visit.          Pily Alexander MD

## 2019-11-05 NOTE — PATIENT INSTRUCTIONS
??? ????? ????? ? ????????????? ?? ??????? ??? ???????? ???????. - [ Learning About Diabetes Food Guidelines ] ?????????? ?? ??????? ????? 
???????????? ??????? ???????? ?????? ???????? ? ??????? ????????? ???????. ??? ???????? ???????????? ??????? ??????? ??????? ? ????? (???????, ??????? ?? ?????????? ????????? ? ??????). ????????????? ???? ?????? ????. ?? ?????? ???? ??, ??? ??? ???? ?????, ?????? ???? ???????. ?? ??????? ???????? ???????? ?? ??, ??? ????? ?? ????? ? ??????? ???????????? ???? ?? ????????. 
??? ?????? ? ???????????? ???????? ??????? ???? ? ??????? ?? ?????? ?????????? ? ????????? ??? ?????????????????? ??????????? ?? ????????? ???????. ???????? ??? ?????????? ?? ????????? ??????? ????? ????? ?????? ??? ????????, ???? ??? ?????? ? ???? ????. 
??? ?? ?????? ?????? ? ??????????? ?????????? 
???????? ?????????? ?????????, ??????? ?? ???????????, ???????? ?????? ?????? ????????? ??????? ??? ???????? ???????. ???????? ?????????? ?? ?????? ?????????. 
· ???????, ????? ???????? ???????? ????????. ????? ????, ??????? ?????????? ????????? ? ?????? ?????????. 
? ????, ????????, ???????? ? ??? ???????? ????? 15 ??????? ????????? ? ??????. ??????? ????????? 1 ????? ????? (30 ???????), 1/2 ????? ?????????????? ???????? ??? ??? 1/3 ????? ?????????????? ??????? ??? ????. 
? ?????? ???????? 15 ??????? ????????? ? ??????. ??????? ????????? 1 ????????? ?????? ?????, ???????? ?????? ??? ????????; ½ ??????; ½ ????? ?????????????? ??? ???????????????? ???????; ½ ????? ?????????? ????; 1 ????? ???? ??? ??????; 2 ???????? ????? ???????????. 
? ?????? ? ?????? ??? ?????????? ?????? ???????? 15 ??????? ????????? ? ??????. ??????? ????????? 1 ????? ?????? ??? 2/3 ????? ??????? ??? ?????????? ??????. 
? ???????????? ????? ???????? 15 ??????? ????????? ? ??????. ??????? ????????? ½ ????? ????????????? ???? ??? ???????? ?????????; 1 ????? ????????????? ?????; ½ ?????? ??????? ???????????; ½ ????? ?????????????? ?????; ½ ????? ?????????????? ???????? ??? ???????? ???????. 
· ???????, ??????? ????????? ????? ???? ?????? ???? ? ? ?????? ??????? ????. ???????? ??? ?????????? ?? ????????? ??????? ????? ??????? ??? ??????? ???????????? ?????????? ???????????? ?????????. ??? ?????????? ????????? ?????????. 
· ???? ?? ?? ??????? ? ???????????? ????? ????????? ??????? ?????????, ??????????? ???????? ????? ???????????? ???????. ??? ???????, ??????? ?????? ?????????? ???????????????? ????? ????. ?? ????? ???????? ???????????? ???????? ? ??????? ???. 
? ????????? ??? ?? ??????? ?? ????? ????. ???????? ?????????????? ????? ?? ???????? ???????, ???? ??? ?????? ???????? ????  ?? ???????? ???????, ? ???????? ??? ???????????? ?????  ?? ?????????? ???????? ???????. ? ????? ?????????? ?? ?????? ???????? ????????? ??????? ?????? ? 1 ????? ?????? ??? ??????? ? ??????????? ?? ????, ??????? ????????? ?? ????????????? ?????????? ?? ??????? ????. 
· ?????????? ???? ?????????? ?????????? ????????? ??? ?????? ?????? ????. ?? «????????????» ???? ??????? ????? ?????????, ????? ?????? ?? ?? ???? ?????. 
· ????? ???????? ????? ???? ??? ?????? ?? ???????? ????????? ? ??????. ????????? ?????? ????????: ????????, ??????, ???????, ????, ????, ????, ???, ?????? ? ?????????? ?????. ?????? ?????? ???? ?????????? 90 ???????, ??? ?? ???????? ????????????? ?????? ????. ?????? ??????????? ???? (?????? 30 ??????? ????) ????? ????????? ???????: 1/4 ????? ???????, 1 ????, 1 ???????? ????? ??????????? ????? ? 1/2 ????? ????. 
??? ????? ???????? ??? ???? ? ??? ?? ????? ???????? ???????? ?????? 
· ????????? ?????????? ??????? ??????, ??????? ???????? ????????. ???? ?? ?????? ????????? ???????? ??????? ?????? ????, ??? ????? ????? ??????? ?????? ?????? ??????????? ???. 
· ???? ?????????, ??? ?????, ??????? ?? ????????, ???????? ??????? ????? ????????? (????????, ?????????, ???????? ??? ???????????????? ??????), ????????? ???????? ??? ?????? ? ?????? ??????????? ?????????. ???????? ????? ??? ??????? ?????. 
· ???? ?? ??????? ???????, ?????????? ???? ??????? ??????? ? ????? ?? ? ????? ??? ??? ????, ????? ?????? ???????????, ??????? ????? ?????? ? ???????. 
· ???? ?? ????? ???? ?? ????? ?????? ?????????, ??? ???????????, ???????????? ??? ???????? ??????????. ??? ??????? ??????? ??????? ??????? ? ?????. 
??? ??? ?? ?????? ?????? 
· ?????????? ??????????? ?????????? ?????, ???????? ????? ?? ???? ? ???????? ?????????. ??? ???????? ?????, ??? ??? ? ????? ? ???????? ???????? ??????? ???? ???????? ????????? ???????????. ??????? ????????? ??????? ????? ???? ? ???????? ???????? ? ?????? ??????????? ???? ??? ????????????. ??????????? ????????? ??? ????????? ????? ?????? ?????????? ????? ??? ?????????? ?????????? ?????, ????????????? ?? ????? ???????. 
· ?? ??????????? ?????? ????. ???? ?? ?????????? ????? ???? ? ??????? ??????? ??? ???????????? ????????????? ????????? ??? ??????? ????????? ???????, ??? ??????? ??????? ????? ?????? ??????? ?????. 
· ??????????????????? ? ??????, ?????? ??? ??????????? ????????. ???????? ????? ??????? ??????? ?????? ??????? ? ?????. ????? ????, ???? ?? ?????????? ???????????? ????????????? ????????? ??? ??????? ????????? ???????, ???????? ????? ???????? ??????????????? ???????? ???????. 
??????????? ?????????? ? ????  ?????? ????? ?????? ??????? ? ????? ?????? ????????. ??????????? ????????? ?????, ???? ??? ??? ???????? ?????. ??? ????????? ??????? ????????? ?????? ?????. ????????????? ????? ?????? ????????, ??????? ?? ??????????, ? ????? ?? ????? ?????????? ????? ????????. 
??? ????? ???????? ?????????????? ??????????? 
????????? [email or web address] ????? I147 ? ?????? ?????? ?????????????? ?????????? \"??? ????? ????? ? ????????????? ?? ??????? ??? ???????? ???????. - [ Learning About Diabetes Food Guidelines ]. \" 
??????? ?????? ??: ?????? 16, 2019 
?????? ????????: 12.2 © [id number] [employer]wise, Incorporated. ??????????? ??????????, ?????????????? ? ???????????? ? ???????? ???????? ????? ??????. ???? ? ??? ????????? ??????? ? ?????-???? ??????????? ??? ?? ??????????, ?????????? ? ?????? ??????????, ??????? ?? ?????? ?????. ????????  Healthwise, Incorporated ?? ???? ???????? ? ?? ????? ??????? ??????????????? ?? ??????????? ????????????? ?????? ??????????.

## 2019-11-06 LAB
ALBUMIN SERPL-MCNC: 4.4 G/DL (ref 3.5–4.8)
ALBUMIN/CREAT UR: 40.1 MG/G CREAT (ref 0–30)
ALBUMIN/GLOB SERPL: 1.7 {RATIO} (ref 1.2–2.2)
ALP SERPL-CCNC: 93 IU/L (ref 39–117)
ALT SERPL-CCNC: 21 IU/L (ref 0–44)
AST SERPL-CCNC: 25 IU/L (ref 0–40)
BASOPHILS # BLD AUTO: 0 X10E3/UL (ref 0–0.2)
BASOPHILS NFR BLD AUTO: 0 %
BILIRUB SERPL-MCNC: 0.4 MG/DL (ref 0–1.2)
BUN SERPL-MCNC: 27 MG/DL (ref 8–27)
BUN/CREAT SERPL: 21 (ref 10–24)
CALCIUM SERPL-MCNC: 10.4 MG/DL (ref 8.6–10.2)
CHLORIDE SERPL-SCNC: 101 MMOL/L (ref 96–106)
CHOLEST SERPL-MCNC: 170 MG/DL (ref 100–199)
CO2 SERPL-SCNC: 21 MMOL/L (ref 20–29)
CREAT SERPL-MCNC: 1.31 MG/DL (ref 0.76–1.27)
CREAT UR-MCNC: 143.3 MG/DL
EOSINOPHIL # BLD AUTO: 0.1 X10E3/UL (ref 0–0.4)
EOSINOPHIL NFR BLD AUTO: 1 %
ERYTHROCYTE [DISTWIDTH] IN BLOOD BY AUTOMATED COUNT: 14.1 % (ref 12.3–15.4)
EST. AVERAGE GLUCOSE BLD GHB EST-MCNC: 154 MG/DL
GLOBULIN SER CALC-MCNC: 2.6 G/DL (ref 1.5–4.5)
GLUCOSE SERPL-MCNC: 110 MG/DL (ref 65–99)
HBA1C MFR BLD: 7 % (ref 4.8–5.6)
HCT VFR BLD AUTO: 45.3 % (ref 37.5–51)
HDLC SERPL-MCNC: 53 MG/DL
HGB BLD-MCNC: 15.2 G/DL (ref 13–17.7)
IMM GRANULOCYTES # BLD AUTO: 0 X10E3/UL (ref 0–0.1)
IMM GRANULOCYTES NFR BLD AUTO: 0 %
INTERPRETATION, 910389: NORMAL
INTERPRETATION: NORMAL
LDLC SERPL CALC-MCNC: 62 MG/DL (ref 0–99)
LYMPHOCYTES # BLD AUTO: 1.7 X10E3/UL (ref 0.7–3.1)
LYMPHOCYTES NFR BLD AUTO: 17 %
MCH RBC QN AUTO: 26.5 PG (ref 26.6–33)
MCHC RBC AUTO-ENTMCNC: 33.6 G/DL (ref 31.5–35.7)
MCV RBC AUTO: 79 FL (ref 79–97)
MICROALBUMIN UR-MCNC: 57.4 UG/ML
MONOCYTES # BLD AUTO: 0.8 X10E3/UL (ref 0.1–0.9)
MONOCYTES NFR BLD AUTO: 8 %
NEUTROPHILS # BLD AUTO: 7.1 X10E3/UL (ref 1.4–7)
NEUTROPHILS NFR BLD AUTO: 74 %
PDF IMAGE, 910387: NORMAL
PLATELET # BLD AUTO: 203 X10E3/UL (ref 150–450)
POTASSIUM SERPL-SCNC: 3.9 MMOL/L (ref 3.5–5.2)
PROT SERPL-MCNC: 7 G/DL (ref 6–8.5)
RBC # BLD AUTO: 5.74 X10E6/UL (ref 4.14–5.8)
SODIUM SERPL-SCNC: 140 MMOL/L (ref 134–144)
T4 FREE SERPL-MCNC: 1.12 NG/DL (ref 0.82–1.77)
TRIGL SERPL-MCNC: 275 MG/DL (ref 0–149)
TSH SERPL DL<=0.005 MIU/L-ACNC: 1.22 UIU/ML (ref 0.45–4.5)
VLDLC SERPL CALC-MCNC: 55 MG/DL (ref 5–40)
WBC # BLD AUTO: 9.8 X10E3/UL (ref 3.4–10.8)

## 2019-11-06 RX ORDER — METFORMIN HYDROCHLORIDE 500 MG/1
500 TABLET ORAL 2 TIMES DAILY WITH MEALS
Qty: 180 TAB | Refills: 1 | Status: SHIPPED | OUTPATIENT
Start: 2019-11-06 | End: 2020-05-05

## 2019-11-06 RX ORDER — PANTOPRAZOLE SODIUM 40 MG/1
40 TABLET, DELAYED RELEASE ORAL DAILY
Qty: 90 TAB | Refills: 3 | Status: SHIPPED | OUTPATIENT
Start: 2019-11-06 | End: 2020-05-22

## 2019-11-06 NOTE — TELEPHONE ENCOUNTER
PCP: Scott Quezada MD    Last appt: 2019  Future Appointments   Date Time Provider Jasvir Guaman   2020  8:15 AM Mai Rangel MD [facility]       Requested Prescriptions     Pending Prescriptions Disp Refills    metFORMIN (GLUCOPHAGE) 500 mg tablet 180 Tab 1     Sig: Take 1 Tab by mouth two (2) times daily (with meals).

## 2019-11-06 NOTE — TELEPHONE ENCOUNTER
Pharm on file verified. They are requesting a refill on the following meds,    LOV 11/05/2019  Last refill. 08/12/2019    Requested Prescriptions     Pending Prescriptions Disp Refills    SITagliptin (JANUVIA) 100 mg tablet 90 Tab 0     Sig: Take 1 Tab by mouth daily.

## 2019-11-06 NOTE — TELEPHONE ENCOUNTER
PCP: Denise Awad MD    Last appt: 11/5/2019  Future Appointments   Date Time Provider Jasvir Guaman   5/5/2020  8:15 AM Ramin Rangel MD [facility]       Requested Prescriptions     Pending Prescriptions Disp Refills    SITagliptin (JANUVIA) 100 mg tablet 90 Tab 0     Sig: Take 1 Tab by mouth daily.  pantoprazole (PROTONIX) 40 mg tablet 90 Tab 0     Sig: Take 1 Tab by mouth daily.

## 2019-11-06 NOTE — TELEPHONE ENCOUNTER
.Pharmacy is requesting a 90 day supply on the medication        . Requested Prescriptions     Pending Prescriptions Disp Refills    metFORMIN (GLUCOPHAGE) 500 mg tablet 180 Tab 1     Sig: Take 1 Tab by mouth two (2) times daily (with meals).      .Pharmacy on file verified          LastRefill:5/10/19        LOV: Tuesday, November 05, 2019  UOV:  Tuesday, May 5, 2020

## 2019-11-06 NOTE — TELEPHONE ENCOUNTER
Pharmacy faxed in a refill request for 90 day supply for the following Rx: . Bharathi Santana Requested Prescriptions     Pending Prescriptions Disp Refills    SITagliptin (JANUVIA) 100 mg tablet 90 Tab 0     Sig: Take 1 Tab by mouth daily.  pantoprazole (PROTONIX) 40 mg tablet 30 Tab 1     Sig: Take 1 Tab by mouth daily.      [facility] [address] [place] [address], [address] [place] [address] PKWY AT [address] [place] [address] & [facility]  [phone]

## 2019-11-07 NOTE — PROGRESS NOTES
Sent in letter:  Labs were very good, including sugar, cholesterol, liver, kidney, electrolytes, thyroid, and blood counts.

## 2020-04-06 RX ORDER — METOPROLOL SUCCINATE 25 MG/1
TABLET, EXTENDED RELEASE ORAL
Qty: 90 TAB | Refills: 1 | Status: SHIPPED | OUTPATIENT
Start: 2020-04-06 | End: 2020-10-06 | Stop reason: SDUPTHER

## 2020-04-06 RX ORDER — AMLODIPINE BESYLATE 10 MG/1
TABLET ORAL
Qty: 90 TAB | Refills: 1 | Status: SHIPPED | OUTPATIENT
Start: 2020-04-06 | End: 2020-10-06 | Stop reason: SDUPTHER

## 2020-04-06 RX ORDER — ATORVASTATIN CALCIUM 40 MG/1
TABLET, FILM COATED ORAL
Qty: 90 TAB | Refills: 1 | Status: SHIPPED | OUTPATIENT
Start: 2020-04-06 | End: 2020-10-06 | Stop reason: SDUPTHER

## 2020-05-05 RX ORDER — METFORMIN HYDROCHLORIDE 500 MG/1
TABLET ORAL
Qty: 180 TAB | Refills: 1 | Status: SHIPPED | OUTPATIENT
Start: 2020-05-05 | End: 2020-11-04 | Stop reason: SDUPTHER

## 2020-05-22 ENCOUNTER — HOSPITAL ENCOUNTER (OUTPATIENT)
Dept: LAB | Age: 80
Discharge: HOME | End: 2020-05-22
Payer: MEDICARE

## 2020-05-22 ENCOUNTER — OFFICE VISIT (OUTPATIENT)
Dept: FAMILY MEDICINE CLINIC | Age: 80
End: 2020-05-22

## 2020-05-22 VITALS
RESPIRATION RATE: 16 BRPM | OXYGEN SATURATION: 96 % | WEIGHT: 193.2 LBS | SYSTOLIC BLOOD PRESSURE: 128 MMHG | BODY MASS INDEX: 32.98 KG/M2 | HEIGHT: 64 IN | HEART RATE: 56 BPM | DIASTOLIC BLOOD PRESSURE: 68 MMHG | TEMPERATURE: 97.6 F

## 2020-05-22 DIAGNOSIS — I25.10 CORONARY ARTERY DISEASE INVOLVING NATIVE CORONARY ARTERY OF NATIVE HEART WITHOUT ANGINA PECTORIS: ICD-10-CM

## 2020-05-22 DIAGNOSIS — M25.562 CHRONIC PAIN OF BOTH KNEES: ICD-10-CM

## 2020-05-22 DIAGNOSIS — R11.0 NAUSEA: ICD-10-CM

## 2020-05-22 DIAGNOSIS — E78.2 MIXED HYPERLIPIDEMIA: ICD-10-CM

## 2020-05-22 DIAGNOSIS — Z00.00 MEDICARE ANNUAL WELLNESS VISIT, SUBSEQUENT: Primary | ICD-10-CM

## 2020-05-22 DIAGNOSIS — I10 ESSENTIAL HYPERTENSION: ICD-10-CM

## 2020-05-22 DIAGNOSIS — G89.29 CHRONIC PAIN OF BOTH KNEES: ICD-10-CM

## 2020-05-22 DIAGNOSIS — M25.561 CHRONIC PAIN OF BOTH KNEES: ICD-10-CM

## 2020-05-22 DIAGNOSIS — E11.21 TYPE 2 DIABETES MELLITUS WITH NEPHROPATHY (HCC): ICD-10-CM

## 2020-05-22 DIAGNOSIS — C67.9 MALIGNANT NEOPLASM OF URINARY BLADDER, UNSPECIFIED SITE (HCC): ICD-10-CM

## 2020-05-22 DIAGNOSIS — E66.9 OBESITY (BMI 30.0-34.9): ICD-10-CM

## 2020-05-22 PROCEDURE — 36415 COLL VENOUS BLD VENIPUNCTURE: CPT

## 2020-05-22 PROCEDURE — 83036 HEMOGLOBIN GLYCOSYLATED A1C: CPT

## 2020-05-22 PROCEDURE — 80048 BASIC METABOLIC PNL TOTAL CA: CPT

## 2020-05-22 RX ORDER — OMEPRAZOLE 40 MG/1
40 CAPSULE, DELAYED RELEASE ORAL DAILY
COMMUNITY

## 2020-05-22 NOTE — PATIENT INSTRUCTIONS
????????? ???????? ????? 66 ???: ??????????? ??????????   Well Visit, Over 72: Care Instructions  ?????????? ?? ??????? ?????  ??????? ? ????? ????? ?????? ?????????? ????????. ???? ???????? ????? ????????? ?????? ???????? ? ????? ???? ??????????? ???????????? ?? ?????? ?????. ????? ????, ???? ????? ????????? ???????. ??? ???????????? ??????????? ????????? ?????????, ????????? ??????????? ??????????? ???????????? ? ?????????? ???? ????.  ??????????? ?????????? ? ???? -- ?????? ????? ?????? ??????? ? ????? ?????? ????????. ??????????? ????????? ?????, ???? ??? ??? ???????? ?????. ??? ????????? ??????? ????????? ?????? ?????. ????????????? ????? ?????? ????????, ??????? ?? ??????????, ? ????? ?? ????? ?????????? ????? ????????.  ??? ???????? ? ???????? ?????????  · ???????????? ???? ??? ? ????????????? ???. ??? ?????? ???? ?????? ???????????, ???????? ????????, ????????? ???????, ??????????? ?????? ? ????????? ????????????? ????????.  · ?????????? ?????????? ??????????? ???????????? ?? ????? ??? ?? 30 ????? ? ????, ?????????? ?????????. ??? ????? ????? ??????? ??????. ???????? ? ?????? ???? ?????????? ??????????, ???????? ???, ????????, ???? ?? ??????????, ?????? ??? ????????? ???? ??????.  · ?? ??????. ??????? ????? ??????? ???????? ?? ?????????. ???? ?? ????????? ??????? ?????? ? ??? ????????? ??????, ???????? ?????? ????? ? ?????????? ? ??????????, ??????? ??????? ?????????? ?? ???? ????????. ??? ???????? ????? ???????? ???? ????? ???????? ??????? ??????.  · ?????? ???????? ?????????????? ???? ?? ??????? ????, ???????? ??? ?????????? ?????. ??????????? ??????????? ?????????????? ????? ???????? ??????? ? ???????????? ???????? (SPF) 30 ? ????. ??????????? ?? ?????? ????, ???? ???? ????????.  · ???? ??? ??? ???? ? ??? ?????????? ???????? ??????????? ??? ????????????????? ??????? ? ????????????? ?????? ?????.  · ? ?????????? ??????????????? ?????? ????????????.  · ?????????? ???????????? ????????: ?? ????? 2 ????? ???????? ? ???? ??? ?????? ? ?? ????? 1 ????? ??? ??????. ??????? ??????? ????? ?????????????? ???????? ????? ???????? ? ????????????? ??????? ?? ?????????.  ?????????? ???????????? ????? ?? ????? ???????? ? ??????????? ?????? ????????????. ??? ???????????? ???????? ??????? ??????????? ?? ?????? ??????.  ??? ?????? ? ??????  · ??????????. ??? ???? ????????? ???, ??? ????? ??? ?????? ? ??????????? ?? ?????? ????????? ???????? ? ?????? ????????, ??????? ????? ???????? ???? ???????? ? ????????.  · ???????????? ????????. ????????? ???? ???????????? ???????? ?? ????? ????????????????? ???????. ??? ???? ????????? ???, ??? ????? ??????? ????????? ???????????? ???????? ? ??????????? ?? ????????, ??????????? ?????? ????????????? ???????? ? ?????? ????????.  · ???????? ??????. ??????? ? ?????, ??????? ?? ??? ????? ??????? ??? ????????? ????????? ???????.  · ??????. ???????? ??????????? ????????? ????????? ???????????? ??? ????????? ???????? ? ?????? ?????????? ??????????? ????.  · ????. ???????? ?????, ???? ?? ???????? ? ???? ?????-???? ????????? ?????. ?? ?????? ?????? ????????? ??? ??????????? ????, ????????? ?????? ?? ???????.  · ???????????? ??? ????????? ???? ???????? ?????????. ????????? ???????????? ??? ????????? ???? ???????? ????????? ? ???????????? ? ?????????????? ?????? ?????. ????? ????????? ???? ?? ?????????? ????? ????????????.  · ???? ???????? ? ????????. ?? ???? ?????? ???? ? 4-6 ??? ??????? ????????? ?????? ????? ???????? ???????? ? ????????. ???? ?????????? ????? ???????, ??? ??? ???????, ???????????? ????????, ??????????, ??????? ??? ???????? ??????, ??? ?????? ????? ???????? ???????? ??? ???????? ? ??????? ????????? 10 ???.  · ??????????. ?????????? ? ?????? ? ???, ??????? ?? ??? ????????? ???????????? ?? ??????????? ????????? ?????? ??? ????????? ?? ??????????. ????? ??????? ? ?????, ???? ?? ??? ????????? ??????? ??????? ? ???????? ? ????????? D.  ??? ??????  · ????? ? ????? ????? ? ???????????????? ????????????. ????????, ??? ?????? ?? ???? ??????? ????? ?? ????? ?????. ?????????? ?? ????? ??????, ??????? ?? ??? ?????????? ??? ?????????? ??????? ????? ?? ????? ?????.  · ???????????? ???????? ????? ? ???????????. ???????, ??? ????? ??? ??????? ????????? ???????????, ??????? ???????? ?????????????????? ????????????? ???????? ?????. ??????????? ????? ??????? ??? ???????? ?????? ?? ????, ??? ??? ????? ?????????????, ????? ??? ????? ????? ????????.  · ??????????? ?????????? ??????. ?????????? ? ?????? ? ???, ??????? ?? ??? ????????? ?????????? ?????? ? ?????? ??????????? ???????????? ???????. ? ?????? ?????????? ???? ???????? ??????????? ?????????? ??????.  ??? ??????  · ???????????? ?????????????? ??????. ??????? ? ???????? ?????, ??????? ?? ??? ??????? ?????? ????? ?? ?????? ???? ?????????????? ?????? -- ???????????????????? ??????? (???). ??????????? ?????????? ?? ??????, ????? ?? ???????? ??????????? ??????? ???? ??????. ????????? ???????? ??????????? ???????? ???????????? ? ????? ????? ??????? ?? ????? ??????.  · ????????? ??????? ?????. ??????? ? ???????? ?????, ??????? ?? ??? ?????? ???????????? ??? ????????? ?????????. ??? ????? ????????????? ????????????, ???? ?? ?????-?????? ?????? ??? ? ?????? ????????, ?????, ?????? ??? ??????? ???? ?????????.  ????? ????? ?????????? ?? ????????  ??????????? ?????????? ?? ?????????? ?????? ???????? ? ??????????? ??????????? ? ????? ??? ????????? ????? ??? ??????????? ??? ?????????.  ??? ????? ???????? ?????????????? ???????????  ?????????   [email or web address]  ????? K859 ? ?????? ?????? ?????????????? ?????????? \"????????? ???????? ????? 65 ???: ??????????? ??????????.\"  ????????????? ??: ??????? 21, 2019?????? ??????????: 12.4  © [id number] [employer]wise, Incorporated.   ??????????? ?????????? ???????????? ?? ???????? ??? ?????????? ? ??????? ???????????????. ???? ? ??? ????????? ??????? ? ????? ? ??????????? ?????????? ??? ?????? ???????????, ?????? ??????? ?????????? ? ?????? ??????????? ? ??????? ???????????????. Healthwise, Incorporated ?? ????? ?????-???? ???????????? ??? ??????????? ??????????????? ?? ????????????? ???? ?????? ??????????.

## 2020-05-22 NOTE — PROGRESS NOTES
Chief Complaint   Patient presents with    Follow-up     fasting     Other     pt c/o nose bleeds x 1 month      1. Have you been to the ER, urgent care clinic since your last visit? Hospitalized since your last visit? No     2. Have you seen or consulted any other health care providers outside of the [address] [place] [address] since your last visit? Include any pap smears or colon screening. Gastroenterologist with    Has an upcoming appointment with Urology.

## 2020-05-22 NOTE — PROGRESS NOTES
Abdelrahman Briggs [facility]  [address] [place], [address]  [phone]             Date of visit: 5/22/2020        This is a Subsequent Medicare Annual Wellness Visit (AWV), (Performed more than 12 months after effective date of Medicare Part B enrollment and 12 months after last preventive visit)    I have reviewed the patient's medical history in detail and updated the computerized patient record. Viki Calix is a 79 y.o. male   History obtained from: patient, wife. Concerns today     When I saw him 6 mo ago was having nausea with reflux and weight loss  I was concerned his h pylori may be back and referred him for EGD, gave him a PPI  He did have it done with Dr. Toula Duane on 1/8  To see him again soon  On omeprazole and it is working     Diabetes good, sugars doing well they think    Blood pressure always good at home     Bladder cancer doing well, cystoscopy coming up this summer  Was normal in January they said  Will see him again 6/17  Sees blood 2-3x per week, just a small amount      Known CAD and hasn't seen heart doctor in a while    History     Patient Active Problem List   Diagnosis Code    Chronic foot pain, right M79.671, G89.29    Essential hypertension I10    Mixed hyperlipidemia E78.2    Type 2 diabetes mellitus with nephropathy ([address] E11.21    Microalbuminuria R80.9    Elevated serum creatinine R79.89    Chronic ankle pain, bilateral M25.571, G89.29, M25.572    Foot pain, bilateral M79.671, M79.672    Urinary hesitancy R39.11    History of gastritis Z87.19    Psoriasis L40.9    Chronic knee pain M25.569, G89.29    Obesity (BMI 30.0-34. 9) E66.9    Snoring R06.83    Coronary artery disease involving native heart without angina pectoris I25.10    Bladder cancer (HCC) C67.9     Past Medical History:   Diagnosis Date    Diabetes ([address]     type 2; niddm    Hypertension     Ill-defined condition     HIGH CHOLESTEROL      Past Surgical History: Procedure Laterality Date    CARDIAC SURG PROCEDURE UNLIST  2018    2 stents placed     HX APPENDECTOMY      HX CATARACT REMOVAL Right     HX COLONOSCOPY  2013    COLONOSCOPY    HX COLONOSCOPY  2018    repeat 3 years    HX ENDOSCOPY  2018    h pylori moderate chronic gastritis    HX ENDOSCOPY  2020    Dr. Tan Lawson HX HEENT Bilateral     STRABISMUS CORRECTION    HX UROLOGICAL  10/31/2019    normal cystosocpy     No Known Allergies  Current Outpatient Medications   Medication Sig Dispense Refill    omeprazole (PRILOSEC) 40 mg capsule Take 40 mg by mouth daily. Take 30 min Before breakfast      glucose blood VI test strips (Contour Next Test Strips) strip USE TO TEST ONCE DAILY 100 Strip 3    metFORMIN (GLUCOPHAGE) 500 mg tablet TAKE 1 TABLET BY MOUTH TWICE DAILY WITH MEALS 180 Tab 1    amLODIPine (NORVASC) 10 mg tablet TAKE 1 TABLET BY MOUTH EVERY NIGHT 90 Tab 1    metoprolol succinate (TOPROL-XL) 25 mg XL tablet TAKE 1 TABLET BY MOUTH DAILY 90 Tab 1    atorvastatin (LIPITOR) 40 mg tablet TAKE 1 TABLET BY MOUTH EVERY DAY 90 Tab 1    losartan (COZAAR) 100 mg tablet TAKE 1 TABLET BY MOUTH DAILY 90 Tab 3    SITagliptin (JANUVIA) 100 mg tablet Take 1 Tab by mouth daily. 90 Tab 3    ondansetron (ZOFRAN ODT) 4 mg disintegrating tablet Take 1 Tab by mouth every eight (8) hours as needed for Nausea. 20 Tab 2    hydroCHLOROthiazide (HYDRODIURIL) 12.5 mg tablet TK 1 T PO QD IN THE MORNING  11    aspirin delayed-release 81 mg tablet 1 pill Mon, Wed, Fri      VIT A/VIT C/VIT E/ZINC/COPPER (OCUVITE PRESERVISION PO) Take 1 Tab by mouth daily.        Family History   Problem Relation Age of Onset    Diabetes Mother     Hypertension Mother     Other Father          age 55, [de-identified] stroke    Diabetes Sister     Hypertension Sister     Anesth Problems Neg Hx      Social History     Tobacco Use    Smoking status: Former Smoker     Packs/day: 0.75     Years: 25.00     Pack years: 18.75     Last attempt to quit: 2004     Years since quittin.4    Smokeless tobacco: Never Used   Substance Use Topics    Alcohol use: No       Specialists/Care Team   [employer] has established care with the following healthcare providers:  Patient Care Team:  Magi Pelaez MD as PCP - General (Family Practice)  Magi Pelaez MD as PCP - [facility] Empaneled Provider  Ema Avila MD (Ophthalmology)  Daniella Barros MD (Ophthalmology)  Chance Davila DPM ([facility])  Amelia Hendricks MD (Orthopedic Surgery)  Jimenez Saha MD (Cardiology)  Jodran Zarate MD (Urology)    Health Risk Assessment     Demographics   male  79 y.o. General Health Questions   -During the past 4 weeks:   -how would you rate your health in general? Good   -how often have you been bothered by feeling dizzy when standing up? never   -how much have you been bothered by bodily pain? Knee arthritis about usual, not worse   -Have you noticed any hearing difficulties? No (wife says a little bit; not bad enough to want hearing aides)   -has your physical and emotional health limited your social activities with family or friends? no    Emotional Health Questions   -Do you have a history of depression, anxiety, or emotional problems? No     3 most recent PHQ Screens 2020   Little interest or pleasure in doing things Not at all   Feeling down, depressed, irritable, or hopeless Not at all   Total Score PHQ 2 0        Health Habits   Please describe your diet habits: eats very well,a voids salt and sugar, drinks water coffee and tea, some diet cola  Do you get 5 servings of fruits or vegetables daily? yes  Do you exercise regularly?  Usually does the bike at the [facility] 3x per week; during pandemic only getting walks and playing badmitton in the yard    Activities of Daily Living and Functional Status   -Do you need help with eating, walking, dressing, bathing, toileting, the phone, transportation, shopping, preparing meals, housework, laundry, medications or managing money? No  -In the past four weeks, was someone available to help you if you needed and wanted help with anything? yes  -Are you confident are you that you can control and manage most of your health problems? yes  -Have you been given information to help you keep track of your medications? yes  -How often do you have trouble taking your medications as prescribed? never    Fall Risk and Home Safety   Have you fallen 2 or more times in the past year? No  Does your home have rugs in the hallway, lack grab bars in the bathroom, lack handrails on the stairs or have poor lighting? They think home is safe  Do you have smoke detectors and check them regularly? yes  Do you have difficulties driving a car? no  Do you always fasten your seat belt when you are in a car? yes    Review of Systems (if indicated for problems addressed today)   Card: denies chest pain  Pulm: denies shortness of breath      Physical Examination     Vitals:    05/22/20 0848 05/22/20 0904   BP: 140/76 128/68   Pulse: (!) 56    Resp: 16    Temp: 97.6 °F (36.4 °C)    TempSrc: Oral    SpO2: 96%    Weight: 193 lb 3.2 oz (87.6 kg)    Height: 5' 4\" (1.626 m)      Body mass index is 33.16 kg/m². No exam data present  Was the patient's timed Up & Go test unsteady or longer than 30 seconds?  Yes, hobbles on his feet     Evaluation of Cognitive Function   Mood/affect:  happy  Orientation: normal   Appearance: age appropriate  Family member/caregiver input: thinks he is doing well    Additional exam if indicated for problems addressed today:  General: stated age, well developed, well nourished and in NAD  Ears: TMs clear, canals clear  Neck: supple, symmetrical, trachea midline, no adenopathy and thyroid: not enlarged, symmetric, no tenderness/mass/nodules  Lungs:  clear to auscultation w/o rales, rhonchi, wheezes w/normal effort and no use of accessory muscles of respiration   Heart: regular rate and rhythm, S1, S2 normal, no murmur, click, rub or gallop  Abdomen: soft, nontender, no masses  Ext:  No edema noted. Normal sensation bilaterally to light touch; no callouses or deformities; 1+ DP pulses bilaterally  Lymph: no cervical adenopathy appreciated  Skin:  Normal. and no rash or abnormalities   Psych: alert and oriented to person, place, time and situation and Speech: appropriate quality, quantity and organization of sentences    Advice/Referrals/Counseling (as indicated)   Education and counseling provided for any problems identified above: diet, exercise    Preventive Services     Health Maintenance   Topic Date Due    Shingrix Vaccine Age 49> (1 of 2) 02/14/1991    Foot Exam Q1  05/17/2020    Medicare Yearly Exam  05/17/2020    Pneumococcal 65+ years (1 of 1 - PPSV23) 11/05/2020 (Originally 2/14/2006)    Influenza Age 5 to Adult  08/01/2020    MICROALBUMIN Q1  11/05/2020    Lipid Screen  11/05/2020    Eye Exam Retinal or Dilated  01/14/2021    GLAUCOMA SCREENING Q2Y  05/30/2021    DTaP/Tdap/Td series (2 - Td) 08/06/2025     (Preventive care checklist to be included in patient instructions)  Discussed today Done Previously Not Needed    X declines   Pneumococcal vaccines   X declines   Flu vaccine   X declines   Hepatitis B vaccine (if at risk)   X declines   Shingles vaccine   X declines   TDAP vaccine     x Digital rectal exam    x  PSA    x x Colorectal cancer screening     x Low-dose CT for lung cancer screening     x Bone density test    x  Glaucoma screening    x  Cholesterol test    x  Diabetes screening test     x  Diabetes self-management class     x Nutritionist referral for diabetes or renal disease     Discussion of Advance Directive   Discussed with Carolann Centeno his ability to prepare and advance directive in the case that an injury or illness causes him to be unable to make health care decisions.    Has not been interested and confirmed today still not interested, has talked to his kids    Assessment/Plan   Z00.00    ICD-10-CM ICD-9-CM    1. Medicare annual wellness visit, subsequent Z00.00 V70.0    2. Type 2 diabetes mellitus with nephropathy (HCC) A77.63 097.40 METABOLIC PANEL, BASIC     583.81 HEMOGLOBIN A1C WITH EAG       DIABETES FOOT EXAM   3. Obesity (BMI 30.0-34. 9) E66.9 278.00    4. Mixed hyperlipidemia E78.2 272.2    5. Essential hypertension I10 401.9    6. Chronic pain of both knees M25.561 719.46     M25.562 338.29     G89.29     7. Coronary artery disease involving native coronary artery of native heart without angina pectoris I25.10 414.01    8. Malignant neoplasm of urinary bladder, unspecified site (HCC) C67.9 188.9    9. Nausea R11.0 787.02        Orders Placed This Encounter    METABOLIC PANEL, BASIC    HEMOGLOBIN A1C WITH EAG     DIABETES FOOT EXAM    omeprazole (PRILOSEC) 40 mg capsule     Still declines vaccines  Preventive care as above  Chronic problems stable, no med chan  Thankfully his stomach is feeling much better, continues prilosec and will see GI next month  I am not sure of EGD result but he was told it was ok, nothing alarming at least and is feeling better    Follow-up and Dispositions    · Return in about 6 months (around 11/22/2020) for Follow up.          Zackary Brambila MD

## 2020-05-23 LAB
BUN SERPL-MCNC: 25 MG/DL (ref 8–27)
BUN/CREAT SERPL: 19 (ref 10–24)
CALCIUM SERPL-MCNC: 9.5 MG/DL (ref 8.6–10.2)
CHLORIDE SERPL-SCNC: 102 MMOL/L (ref 96–106)
CO2 SERPL-SCNC: 20 MMOL/L (ref 20–29)
CREAT SERPL-MCNC: 1.29 MG/DL (ref 0.76–1.27)
EST. AVERAGE GLUCOSE BLD GHB EST-MCNC: 160 MG/DL
GLUCOSE SERPL-MCNC: 130 MG/DL (ref 65–99)
HBA1C MFR BLD: 7.2 % (ref 4.8–5.6)
INTERPRETATION: NORMAL
POTASSIUM SERPL-SCNC: 4 MMOL/L (ref 3.5–5.2)
SODIUM SERPL-SCNC: 139 MMOL/L (ref 134–144)

## 2020-05-23 NOTE — PROGRESS NOTES
Sent in letter:  Kidney, sugar, and electrolytes are all stable and good. Keep doing what you have been doing, and we can recheck in 6 months!

## 2020-10-06 RX ORDER — AMLODIPINE BESYLATE 10 MG/1
10 TABLET ORAL
Qty: 90 TAB | Refills: 1 | Status: SHIPPED | OUTPATIENT
Start: 2020-10-06 | End: 2021-04-13 | Stop reason: SDUPTHER

## 2020-10-06 RX ORDER — ATORVASTATIN CALCIUM 40 MG/1
40 TABLET, FILM COATED ORAL DAILY
Qty: 90 TAB | Refills: 1 | Status: SHIPPED | OUTPATIENT
Start: 2020-10-06 | End: 2021-04-13 | Stop reason: SDUPTHER

## 2020-10-06 RX ORDER — METOPROLOL SUCCINATE 25 MG/1
25 TABLET, EXTENDED RELEASE ORAL DAILY
Qty: 90 TAB | Refills: 1 | Status: SHIPPED | OUTPATIENT
Start: 2020-10-06 | End: 2021-04-13 | Stop reason: SDUPTHER

## 2020-10-06 NOTE — TELEPHONE ENCOUNTER
Last Visit: 5/22/20  MD Rangle  Next Appointment: 11/24/20  MD Rangel  Previous Refill Encounter(s): 4/6/20  90 + 1 (all)    Requested Prescriptions     Pending Prescriptions Disp Refills    amLODIPine (NORVASC) 10 mg tablet 90 Tab 1     Sig: Take 1 Tab by mouth nightly.  atorvastatin (LIPITOR) 40 mg tablet 90 Tab 1     Sig: Take 1 Tab by mouth daily.  metoprolol succinate (TOPROL-XL) 25 mg XL tablet 90 Tab 1     Sig: Take 1 Tab by mouth daily.

## 2020-11-04 RX ORDER — METFORMIN HYDROCHLORIDE 500 MG/1
500 TABLET ORAL 2 TIMES DAILY WITH MEALS
Qty: 180 TAB | Refills: 1 | Status: SHIPPED | OUTPATIENT
Start: 2020-11-04 | End: 2021-05-11 | Stop reason: SDUPTHER

## 2020-11-04 NOTE — TELEPHONE ENCOUNTER
Last Visit: 5/22/20  MD Rangel  Next Appointment: 11/24/20  MD Rangel  Previous refill encounter(s)   11/06/2019 Jacobouvia #90 with 3 refills. Requested Prescriptions     Pending Prescriptions Disp Refills    SITagliptin (Januvia) 100 mg tablet 90 Tab 1     Sig: Take 1 Tab by mouth daily.

## 2020-11-04 NOTE — TELEPHONE ENCOUNTER
Last Visit: 5/22/20  MD Rangel  Next Appointment: 11/24/20  MD Rangel  Previous refill encounter(s)   05/05/2020 Glucophage #180 with 1 refill. Requested Prescriptions     Pending Prescriptions Disp Refills    metFORMIN (GLUCOPHAGE) 500 mg tablet 180 Tab 1     Sig: Take 1 Tab by mouth two (2) times daily (with meals).

## 2020-11-23 NOTE — PROGRESS NOTES
Abdelrahman Briggs [facility]  [place]. [facility], [address]  [phone]             Date of visit: 11/24/2020   Subjective:      History obtained from:  Wife and the patient. Erum Crowley is a 79 y.o. male who presents today for   Chief Complaint   Patient presents with    Diabetes    Hypertension    Cholesterol Problem     Feeling very well    Diabetes usually well-controlled (last a1c 7.2) and due for labs  Sugars 158 today, never over 200, lowest maybe 144, highest 175  Eating lots of veggies  Thinks he needs to cut back on bread a little  Exercising regularly at the [facility]    GERD on omeprazole working well for him      Bladder cancer doing well, seems to be resolved  Has had another cystoscopy and everything ok    Hx CAD, on aspirin 3 days per week and no symptoms  Declines to see cardiologist    Taking hctz 12.5mg but I had not prescribed, he is sure he is taking it  Doesn't check bp at home    Does not like to get vaccines and declines again today    Patient Active Problem List    Diagnosis Date Noted    Bladder cancer ([facility] [address] 06/10/2019    Coronary artery disease involving native heart without angina pectoris 05/17/2019    Obesity (BMI 30.0-34.9) 02/15/2019    Snoring 02/15/2019    Chronic knee pain 11/15/2018    Psoriasis 08/16/2018    History of gastritis 04/18/2018    Chronic ankle pain, bilateral 04/10/2018    Foot pain, bilateral 04/10/2018    Urinary hesitancy 04/10/2018    Elevated serum creatinine 01/06/2018    Type 2 diabetes mellitus with nephropathy ([facility] [address] 01/04/2018    Microalbuminuria 01/04/2018    Chronic foot pain, right 01/02/2018    Essential hypertension 01/02/2018    Mixed hyperlipidemia 01/02/2018     Current Outpatient Medications   Medication Sig Dispense Refill    losartan (COZAAR) 100 mg tablet TAKE 1 TABLET BY MOUTH DAILY 90 Tab 1    SITagliptin (Januvia) 100 mg tablet Take 1 Tab by mouth daily.  90 Tab 1    metFORMIN (GLUCOPHAGE) 500 mg tablet Take 1 Tab by mouth two (2) times daily (with meals). 180 Tab 1    amLODIPine (NORVASC) 10 mg tablet Take 1 Tab by mouth nightly. 90 Tab 1    atorvastatin (LIPITOR) 40 mg tablet Take 1 Tab by mouth daily. 90 Tab 1    metoprolol succinate (TOPROL-XL) 25 mg XL tablet Take 1 Tab by mouth daily. 90 Tab 1    omeprazole (PRILOSEC) 40 mg capsule Take 40 mg by mouth daily. Take 30 min Before breakfast      glucose blood VI test strips (Contour Next Test Strips) strip USE TO TEST ONCE DAILY 100 Strip 3    ondansetron (ZOFRAN ODT) 4 mg disintegrating tablet Take 1 Tab by mouth every eight (8) hours as needed for Nausea. 20 Tab 2    aspirin delayed-release 81 mg tablet 1 pill Mon, Wed, Fri      VIT A/VIT C/VIT E/ZINC/COPPER (OCUVITE PRESERVISION PO) Take 1 Tab by mouth daily.        No Known Allergies  Past Medical History:   Diagnosis Date    Diabetes ([facility] [address]     type 2; niddm    Hypertension     Ill-defined condition     HIGH CHOLESTEROL     Past Surgical History:   Procedure Laterality Date    CARDIAC SURG PROCEDURE UNLIST  2018    2 stents placed     HX APPENDECTOMY      HX CATARACT REMOVAL Right     HX COLONOSCOPY  2013    COLONOSCOPY    HX COLONOSCOPY  2018    repeat 3 years    HX ENDOSCOPY  2018    h pylori moderate chronic gastritis    HX ENDOSCOPY  2020    Dr. Madhavi Chritsianson    HX HEENT Bilateral     STRABISMUS CORRECTION    HX UROLOGICAL  10/31/2019    normal cystosocpy     Family History   Problem Relation Age of Onset    Diabetes Mother     Hypertension Mother     Other Father          age 55, [de-identified] stroke    Diabetes Sister     Hypertension Sister     Anesth Problems Neg Hx      Social History     Tobacco Use    Smoking status: Former Smoker     Packs/day: 0.75     Years: 25.00     Pack years: 18.75     Last attempt to quit: 2004     Years since quittin.9    Smokeless tobacco: Never Used   Substance Use Topics    Alcohol use: No      Social History Social History Narrative        2 kids and 4 grandkids live closeby    Originally from [facility] 66: denies chest pain  Pulm: denies shortness of breath      Objective:     Vitals:    11/24/20 0814 11/24/20 0834   BP: (!) 167/85 132/76   Pulse: (!) 56    Resp: 16    Temp: 98.1 °F (36.7 °C)    TempSrc: Temporal    SpO2: 95%    Weight: 194 lb (88 kg)    Height: 5' 4\" (1.626 m)      Body mass index is 33.3 kg/m². General: stated age, well developed, well nourished and in NAD  Neck: supple, symmetrical, trachea midline, no adenopathy and thyroid: not enlarged, symmetric, no tenderness/mass/nodules  Lungs:  clear to auscultation w/o rales, rhonchi, wheezes w/normal effort and no use of accessory muscles of respiration   Heart: regular rate and rhythm, S1, S2 normal, no murmur, click, rub or gallop  Abdomen: soft, nontender  Ext:  No edema noted. Lymph: no cervical adenopathy appreciated  Skin:  Normal. and no rash or abnormalities   Psych: alert and oriented to person, place, time and situation and Speech: appropriate quality, quantity and organization of sentences     Assessment/Plan:       ICD-10-CM ICD-9-CM    1. Type 2 diabetes mellitus with nephropathy (HCC)  E11.21 250.40 CBC WITH AUTOMATED DIFF     181.90 METABOLIC PANEL, COMPREHENSIVE      LIPID PANEL      HEMOGLOBIN A1C WITH EAG      MICROALBUMIN, UR, RAND W/ MICROALB/CREAT RATIO   2. Essential hypertension  I10 401.9    3. Obesity (BMI 30.0-34. 9)  E66.9 278.00    4. Mixed hyperlipidemia  E78.2 272.2    5. Malignant neoplasm of urinary bladder, unspecified site (HCC)  C67.9 188.9    6.  Coronary artery disease involving native coronary artery of native heart without angina pectoris  I25.10 414.01         Orders Placed This Encounter    CBC WITH AUTOMATED DIFF    METABOLIC PANEL, COMPREHENSIVE    LIPID PANEL    HEMOGLOBIN A1C WITH EAG    MICROALBUMIN, UR, RAND W/ MICROALB/CREAT RATIO    losartan (COZAAR) 100 mg tablet       Doing very well  Encouraged continued attempts at weight loss, less breads  Labs today  No med changes    Discussed the diagnosis and plan and he expressed understanding. Follow-up and Dispositions    · Return in about 6 months (around 5/24/2021) for Annual Wellness Visit, Follow up.          Denney Sandhoff, MD

## 2020-11-23 NOTE — PATIENT INSTRUCTIONS
????????? ????? ??? ???????: ??????????? ?????????? 
Diabetes Foot Health: Care Instructions 
?????????? ?? ??????? ????? 
  
??? ??????? ????? ????????? ? ?????????????? ????? ? ????????. ?????? ????? ???????? ? ??????????? ??????? ????????? ? ??????? ????, ??? ??????? ??????????? ??????????? ????? ?????. ?????? ????? ???????????? ??????????? ????????? ???????? ? ????????? ? ?????????????? ????????, ??????? ????????? ? ???. ??? ????????? ??????? ????? ????? ?????????? ???? ??? ????????? ????????. ?? ???? ????? ?? ??????? ????? ????????????? ??????????? ?? ???? ???????. 
???? ?? ??????? ????? ???? ??????? ? ??????. ???????? ????? ????? ????? ????????? ??? ???????? ????? ??? ?????????? ?? ???. 
??????????? ?????????? ? ????  ?????? ????? ?????? ??????? ? ????? ?????? ????????. ??????????? ????????? ?????, ???? ??? ??? ???????? ?????. ??? ????????? ??????? ????????? ?????? ?????. ????????????? ????? ?????? ????????, ??????? ?? ??????????, ? ????? ?? ????? ?????????? ????? ????????. 
??? ???????? ? ???????? ????????? 
· ????????????? ??????? ?????? ? ????? ?????? ?? ?????, ???????? ?? ????? ????????, ??????????? ??????? ?????? ? ?????, ???????? ?????????, ???? ??? ?????????, ? ????????? ????????? ??????????? ????????????. 
· ?? ??????. ??????? ?????? ?? ???????? ? ????? ????????? ???????? ?? ???????. ???? ?? ????????? ??????? ?????? ? ??? ????????? ??????, ???????? ????? ? ?????????? ? ??????????, ??????? ??????? ?????????? ?? ???? ????????. ??? ????? ???????? ???? ????? ???????? ??????? ??????. 
· ??????????? ?????? ? ?????? ??????????? ?????. ??????? ??????????? ????? ????? ??????? ?????????? ???? ? ??????????? ??????? ? ???????? ?????????. 
· ????????? ???????????? ????? ?? ??????? ????????? ????????, ???????, ?????? ? ???. ???? ??? ????? ?????, ??????????? ??????? ??? ????????? ????-?????? ??????. 
· ?????????? ?? ?????? ???????. 
? ????? ????? ?????? ????. ??????????? ?????? (? ?? ???????) ????. ?????????? ??????????? ???? ????????? ??? ?????? ?????? ????, ? ?? ??????. 
? ?????? ?????? ?????. ????????? ?? ??????. ?? ????? ???? ???? ??????? ??????. ?????? ?????? ???? ????? ???????? ???. ???? ???? ???? ???????? ???????, ?? ??? ????? ????? ???????? ??? ?????, ??? ????? ???????? ? ????????. 
? ????????????? ???????????? ????. ??????????? ??????????? ???? ??? ??????????? ???????????? ???? ?? ?????? ? ???????????? ??????? ? ??????. ?????? ?? ???????? ???? ????? ???????? ? ?????????? ????????????? ?????, ??????? ???????? ????. 
? ????????? ??????? ??? ??????? ??????? ????. ?? ??????????? ?????? ???????? ??? ?????? ??? ???????. ??????????? ????? ????? ????? ??? ?????? ??? ?????? ???????????? ??????????. 
? ????????? ? ????????????? ?????? ????? ? ?????????? ??????????? ??? ???????????? ??????? ??????. ??????????? ??????? ??? ????????, ? ?? ???????. ??????????? ????????? ????? ??? ??????????? ?????. 
· ????????? ??????? ?????. ????? ???????????? ????? ??? ????, ????????? ??? ????? ???????? ?????. ????? ??? ????? ? ???????? ????? ????? ? ??????????? ?????????. 
· ?????? ???? ???????????? ?????????? ??????????? ????? ??? ????????? ???????? ?????? ????????, ?????, ???????? ???????, ??????? ????? ??????? ??????????? ??????? ? ???. 
· ????????? ?????, ??????? ???????? ???. 
? ????? ????? ?? ????????? ????????????? ?????? ???????. ??? ???????? ????????????? ????????? ? ??????. 
? ?????????? ????? ? ???? ???????, ??????? ?? ?????? ?????? ? ??????. 
? ????????? ????? ?? ??????? ????????????? ??????????. ??? ????? ???????? ????? ? ???????? ??????????? ???????. ??????? ????? ?????? ???? ??????????? ?? ?????? ? ??????? ??????????, ???????? ???? ??? ?????. 
? ?????????? ?? ????? ????? ??????????, ??????? ?? ?? ????? ??? ?? ??? ? ???? ? ??????? ?????????? ????. ????????? ????? ?? ???????? ???? ?? ??????? ???????????, ??????? ??? ?????? ??????? ????? ??????? ????? ?????. 
 · ?? ?????? ???????. ?? ?????? ???????? ??? ????? ? ????? ?????? ????????. ?????? ??????? ????? ?????????. ??? ????? ?? ???????? ????? ?? ??????? ???????????? ??? ???????? ??????. 
· ????????? ????? ????????? ???? ????? ?? ????? ??????? ??????. ??? ??????? ??????? ?? ??????? ?????????? ? ?????. ?? ????????? ?????? ???????????? ???????? ?? ??????? ?? ????. ???????? ?????? ??? ??????? ?? ????, ??????? ????? ?????? ??? ??????? (????????, ???????? ??? ???????? ???????), ????? ???? ????????. 
· ????????? ??????? ??? ????????? ?? ??????? ??? ????? ??????. ?????????????? ??????????? ????? ???????? ? ????????? ????????, ???? ?? ?????? ?????????? ??????? ??????. 
????? ????? ?????????? ?? ???????? 
?????????? ??????? ????? ??? ??????????? ?? ??????? ??????????? ???????: 
· ??? ????????? ???? ? ?????, ??? ??? ???????? ????, ?? ?????????? ? ??????? 4 ????, ???????????? ???????, ??????? ??????? ??? ??????? ?????; 
· ??? ????????? ????? ??? ?????? ????????, ??????? ????? ???????? ???????????? ??? ????????? ?????????; 
· ??? ????????? ????, ????????? ????????? ????????? ????? ????????, ?????????????? ? ?????????????? ????; 
· ??? ?????????, ?????????????? ????? 24 ?????, ? ????? ? ?????; 
· ??? ????????? ???????? ??? ??????????? ? ??????, ??????? ?? ???????? ????? ??????????? ???? ??? ????? ????; 
· ??? ???????????? ??? ????????? ????? ????? ??? ?????????????? ???????. 
??????????? ?????????? ?? ??????????? ?????? ????????? ? ??????????? ??????????? ? ???????? ????? ? ????????? ???????. 
· ?? ?? ?????? ???????????? ?????????? ???? ?? ???????. 
??? ????? ???????? ?????????????? ??????????? 
????????? ??  
[email or web address] ??????? [id number] ? ???? ??????, ????? ?????? ?????? ? \"????????? ????? ??? ???????: ??????????? ??????????.\" 
????????????? ??: ??????? 20, 2019               ?????? ??????????: 12.6 © [id number] [employer].   
??????????? ?????????? ???????????? ?? ???????? ??? ?????????? ? ??????? ???????????????. ???? ? ??? ????????? ??????? ? ????? ? ??????????? ?????????? ??? ?????? ???????????, ?????? ??????? ?????????? ? ?????? ??????????? ? ??????? ???????????????. [employer] ?? ????? ?????-???? ???????????? ??? ??????????? ??????????????? ?? ????????????? ???? ?????? ??????????.

## 2020-11-24 ENCOUNTER — OFFICE VISIT (OUTPATIENT)
Dept: FAMILY MEDICINE CLINIC | Age: 80
End: 2020-11-24
Payer: MEDICARE

## 2020-11-24 VITALS
SYSTOLIC BLOOD PRESSURE: 132 MMHG | HEART RATE: 56 BPM | OXYGEN SATURATION: 95 % | BODY MASS INDEX: 33.12 KG/M2 | DIASTOLIC BLOOD PRESSURE: 76 MMHG | RESPIRATION RATE: 16 BRPM | TEMPERATURE: 98.1 F | WEIGHT: 194 LBS | HEIGHT: 64 IN

## 2020-11-24 DIAGNOSIS — I10 ESSENTIAL HYPERTENSION: ICD-10-CM

## 2020-11-24 DIAGNOSIS — C67.9 MALIGNANT NEOPLASM OF URINARY BLADDER, UNSPECIFIED SITE (HCC): ICD-10-CM

## 2020-11-24 DIAGNOSIS — E78.2 MIXED HYPERLIPIDEMIA: ICD-10-CM

## 2020-11-24 DIAGNOSIS — E11.21 TYPE 2 DIABETES MELLITUS WITH NEPHROPATHY (HCC): Primary | ICD-10-CM

## 2020-11-24 DIAGNOSIS — E66.9 OBESITY (BMI 30.0-34.9): ICD-10-CM

## 2020-11-24 DIAGNOSIS — I25.10 CORONARY ARTERY DISEASE INVOLVING NATIVE CORONARY ARTERY OF NATIVE HEART WITHOUT ANGINA PECTORIS: ICD-10-CM

## 2020-11-24 LAB
ALBUMIN SERPL-MCNC: 4 G/DL (ref 3.5–5)
ALBUMIN/GLOB SERPL: 1.3 {RATIO} (ref 1.1–2.2)
ALP SERPL-CCNC: 105 U/L (ref 45–117)
ALT SERPL-CCNC: 32 U/L (ref 12–78)
ANION GAP SERPL CALC-SCNC: 8 MMOL/L (ref 5–15)
AST SERPL-CCNC: 20 U/L (ref 15–37)
BASOPHILS # BLD: 0 K/UL (ref 0–0.1)
BASOPHILS NFR BLD: 0 % (ref 0–1)
BILIRUB SERPL-MCNC: 0.5 MG/DL (ref 0.2–1)
BUN SERPL-MCNC: 29 MG/DL (ref 6–20)
BUN/CREAT SERPL: 19 (ref 12–20)
CALCIUM SERPL-MCNC: 9.9 MG/DL (ref 8.5–10.1)
CHLORIDE SERPL-SCNC: 103 MMOL/L (ref 97–108)
CHOLEST SERPL-MCNC: 178 MG/DL
CO2 SERPL-SCNC: 28 MMOL/L (ref 21–32)
CREAT SERPL-MCNC: 1.52 MG/DL (ref 0.7–1.3)
CREAT UR-MCNC: 70.8 MG/DL
DIFFERENTIAL METHOD BLD: ABNORMAL
EOSINOPHIL # BLD: 0.2 K/UL (ref 0–0.4)
EOSINOPHIL NFR BLD: 2 % (ref 0–7)
ERYTHROCYTE [DISTWIDTH] IN BLOOD BY AUTOMATED COUNT: 14.6 % (ref 11.5–14.5)
EST. AVERAGE GLUCOSE BLD GHB EST-MCNC: 148 MG/DL
GLOBULIN SER CALC-MCNC: 3.2 G/DL (ref 2–4)
GLUCOSE SERPL-MCNC: 152 MG/DL (ref 65–100)
HBA1C MFR BLD: 6.8 % (ref 4–5.6)
HCT VFR BLD AUTO: 48.2 % (ref 36.6–50.3)
HDLC SERPL-MCNC: 50 MG/DL
HDLC SERPL: 3.6 {RATIO} (ref 0–5)
HGB BLD-MCNC: 15.1 G/DL (ref 12.1–17)
IMM GRANULOCYTES # BLD AUTO: 0 K/UL (ref 0–0.04)
IMM GRANULOCYTES NFR BLD AUTO: 0 % (ref 0–0.5)
LDLC SERPL CALC-MCNC: 95 MG/DL (ref 0–100)
LIPID PROFILE,FLP: ABNORMAL
LYMPHOCYTES # BLD: 1.6 K/UL (ref 0.8–3.5)
LYMPHOCYTES NFR BLD: 17 % (ref 12–49)
MCH RBC QN AUTO: 26.1 PG (ref 26–34)
MCHC RBC AUTO-ENTMCNC: 31.3 G/DL (ref 30–36.5)
MCV RBC AUTO: 83.4 FL (ref 80–99)
MICROALBUMIN UR-MCNC: 13 MG/DL
MICROALBUMIN/CREAT UR-RTO: 184 MG/G (ref 0–30)
MONOCYTES # BLD: 0.7 K/UL (ref 0–1)
MONOCYTES NFR BLD: 8 % (ref 5–13)
NEUTS SEG # BLD: 6.5 K/UL (ref 1.8–8)
NEUTS SEG NFR BLD: 73 % (ref 32–75)
NRBC # BLD: 0 K/UL (ref 0–0.01)
NRBC BLD-RTO: 0 PER 100 WBC
PLATELET # BLD AUTO: 184 K/UL (ref 150–400)
PMV BLD AUTO: 10.5 FL (ref 8.9–12.9)
POTASSIUM SERPL-SCNC: 4.1 MMOL/L (ref 3.5–5.1)
PROT SERPL-MCNC: 7.2 G/DL (ref 6.4–8.2)
RBC # BLD AUTO: 5.78 M/UL (ref 4.1–5.7)
SODIUM SERPL-SCNC: 139 MMOL/L (ref 136–145)
TRIGL SERPL-MCNC: 165 MG/DL (ref ?–150)
VLDLC SERPL CALC-MCNC: 33 MG/DL
WBC # BLD AUTO: 8.9 K/UL (ref 4.1–11.1)

## 2020-11-24 PROCEDURE — G8536 NO DOC ELDER MAL SCRN: HCPCS | Performed by: FAMILY MEDICINE

## 2020-11-24 PROCEDURE — G0463 HOSPITAL OUTPT CLINIC VISIT: HCPCS | Performed by: FAMILY MEDICINE

## 2020-11-24 PROCEDURE — G8752 SYS BP LESS 140: HCPCS | Performed by: FAMILY MEDICINE

## 2020-11-24 PROCEDURE — 3051F HG A1C>EQUAL 7.0%<8.0%: CPT | Performed by: FAMILY MEDICINE

## 2020-11-24 PROCEDURE — G8427 DOCREV CUR MEDS BY ELIG CLIN: HCPCS | Performed by: FAMILY MEDICINE

## 2020-11-24 PROCEDURE — 99214 OFFICE O/P EST MOD 30 MIN: CPT | Performed by: FAMILY MEDICINE

## 2020-11-24 PROCEDURE — 1101F PT FALLS ASSESS-DOCD LE1/YR: CPT | Performed by: FAMILY MEDICINE

## 2020-11-24 PROCEDURE — G8754 DIAS BP LESS 90: HCPCS | Performed by: FAMILY MEDICINE

## 2020-11-24 PROCEDURE — G8432 DEP SCR NOT DOC, RNG: HCPCS | Performed by: FAMILY MEDICINE

## 2020-11-24 PROCEDURE — G8417 CALC BMI ABV UP PARAM F/U: HCPCS | Performed by: FAMILY MEDICINE

## 2020-11-24 RX ORDER — LOSARTAN POTASSIUM 100 MG/1
TABLET ORAL
Qty: 90 TAB | Refills: 1 | Status: SHIPPED | OUTPATIENT
Start: 2020-11-24 | End: 2021-04-13 | Stop reason: SDUPTHER

## 2020-11-24 NOTE — PROGRESS NOTES
Chief Complaint   Patient presents with    Diabetes    Hypertension    Cholesterol Problem     1. Have you been to the ER, urgent care clinic since your last visit? Hospitalized since your last visit? No    2. Have you seen or consulted any other health care providers outside of the [address] [place] [address] since your last visit? Include any pap smears or colon screening.  No

## 2020-11-25 NOTE — PROGRESS NOTES
Sent in letter:  Labs were very good, overall! Sugar is actually over-controlled. I recommend you take just 1/2 Januvia pill daily (50mg). If you want a new prescription for 50mg pills, just let me know. Kidney test (creatinine) is slightly higher but not bad. Please be sure to stay hydrated with several glasses of water daily. Also, please do NOT take NSAID medications, like aleve or ibuprofen, as these can hurt the kidneys. Liver, blood counts, and cholesterol were great!

## 2021-02-19 ENCOUNTER — OFFICE VISIT (OUTPATIENT)
Dept: FAMILY MEDICINE CLINIC | Age: 81
End: 2021-02-19
Payer: MEDICARE

## 2021-02-19 VITALS
HEIGHT: 64 IN | HEART RATE: 82 BPM | BODY MASS INDEX: 32.91 KG/M2 | SYSTOLIC BLOOD PRESSURE: 118 MMHG | TEMPERATURE: 98.2 F | WEIGHT: 192.8 LBS | DIASTOLIC BLOOD PRESSURE: 68 MMHG | OXYGEN SATURATION: 97 % | RESPIRATION RATE: 16 BRPM

## 2021-02-19 DIAGNOSIS — S62.307D CLOSED DISPLACED FRACTURE OF FIFTH METACARPAL BONE OF LEFT HAND WITH ROUTINE HEALING, UNSPECIFIED PORTION OF METACARPAL, SUBSEQUENT ENCOUNTER: ICD-10-CM

## 2021-02-19 DIAGNOSIS — E11.21 TYPE 2 DIABETES MELLITUS WITH NEPHROPATHY (HCC): ICD-10-CM
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
## 2021-02-19 DIAGNOSIS — S01.01XD LACERATION OF SCALP, SUBSEQUENT ENCOUNTER: Primary | ICD-10-CM

## 2021-02-19 DIAGNOSIS — I10 ESSENTIAL HYPERTENSION: ICD-10-CM

## 2021-02-19 PROCEDURE — G8754 DIAS BP LESS 90: HCPCS | Performed by: FAMILY MEDICINE

## 2021-02-19 PROCEDURE — G8510 SCR DEP NEG, NO PLAN REQD: HCPCS | Performed by: FAMILY MEDICINE

## 2021-02-19 PROCEDURE — G8752 SYS BP LESS 140: HCPCS | Performed by: FAMILY MEDICINE

## 2021-02-19 PROCEDURE — 99213 OFFICE O/P EST LOW 20 MIN: CPT | Performed by: FAMILY MEDICINE

## 2021-02-19 PROCEDURE — G8427 DOCREV CUR MEDS BY ELIG CLIN: HCPCS | Performed by: FAMILY MEDICINE

## 2021-02-19 PROCEDURE — G8417 CALC BMI ABV UP PARAM F/U: HCPCS | Performed by: FAMILY MEDICINE

## 2021-02-19 PROCEDURE — G8536 NO DOC ELDER MAL SCRN: HCPCS | Performed by: FAMILY MEDICINE

## 2021-02-19 PROCEDURE — 1101F PT FALLS ASSESS-DOCD LE1/YR: CPT | Performed by: FAMILY MEDICINE

## 2021-02-19 RX ORDER — HYDROCHLOROTHIAZIDE 12.5 MG/1
12.5 TABLET ORAL DAILY
COMMUNITY
End: 2021-04-13 | Stop reason: SDUPTHER
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
## 2021-04-13 RX ORDER — METOPROLOL SUCCINATE 25 MG/1
25 TABLET, EXTENDED RELEASE ORAL DAILY
Qty: 90 TAB | Refills: 1 | Status: SHIPPED | OUTPATIENT
Start: 2021-04-13 | End: 2021-10-15 | Stop reason: SDUPTHER

## 2021-04-13 RX ORDER — HYDROCHLOROTHIAZIDE 12.5 MG/1
12.5 TABLET ORAL DAILY
Qty: 90 TAB | Refills: 1 | Status: SHIPPED | OUTPATIENT
Start: 2021-04-13 | End: 2021-10-15 | Stop reason: SDUPTHER

## 2021-04-13 RX ORDER — LOSARTAN POTASSIUM 100 MG/1
100 TABLET ORAL DAILY
Qty: 90 TAB | Refills: 1 | Status: SHIPPED | OUTPATIENT
Start: 2021-04-13 | End: 2021-10-15 | Stop reason: SDUPTHER

## 2021-04-13 RX ORDER — ATORVASTATIN CALCIUM 40 MG/1
40 TABLET, FILM COATED ORAL DAILY
Qty: 90 TAB | Refills: 1 | Status: SHIPPED | OUTPATIENT
Start: 2021-04-13 | End: 2021-10-15 | Stop reason: SDUPTHER

## 2021-04-13 RX ORDER — AMLODIPINE BESYLATE 10 MG/1
10 TABLET ORAL
Qty: 90 TAB | Refills: 1 | Status: SHIPPED | OUTPATIENT
Start: 2021-04-13 | End: 2021-10-15 | Stop reason: SDUPTHER

## 2021-04-13 NOTE — TELEPHONE ENCOUNTER
Last Visit: 2/19/21 MD Jina Rubi  Next Appointment: 5/25/21 MD Jina Rubi  Previous Refill Encounter(s):  Losartan 11/24/20 90 + 1  Metoprolol 10/6/20 90 + 1    Requested Prescriptions     Pending Prescriptions Disp Refills    losartan (COZAAR) 100 mg tablet 90 Tab 1     Sig: Take 1 Tab by mouth daily.  metoprolol succinate (TOPROL-XL) 25 mg XL tablet 90 Tab 1     Sig: Take 1 Tab by mouth daily.

## 2021-04-13 NOTE — TELEPHONE ENCOUNTER
MD Freeman Figueroa,    Requests for refills. More to follow. Thanks, María Bowser    Last Visit: 2/19/21 MD Freeman Figueroa  Next Appointment: 5/25/21 MD Freeman Figueroa  Previous Refill Encounter(s):   Amlodipine 10/6/20 90 + 1  Atorvastatin 10/6/20 90 + 1  hctz- historical provider    Requested Prescriptions     Pending Prescriptions Disp Refills    amLODIPine (NORVASC) 10 mg tablet 90 Tab 1     Sig: Take 1 Tab by mouth nightly.  atorvastatin (LIPITOR) 40 mg tablet 90 Tab 1     Sig: Take 1 Tab by mouth daily.  hydroCHLOROthiazide (HYDRODIURIL) 12.5 mg tablet 90 Tab 1     Sig: Take 1 Tab by mouth daily.

## 2021-05-11 RX ORDER — METFORMIN HYDROCHLORIDE 500 MG/1
500 TABLET ORAL 2 TIMES DAILY WITH MEALS
Qty: 180 TAB | Refills: 1 | Status: SHIPPED | OUTPATIENT
Start: 2021-05-11 | End: 2021-11-19 | Stop reason: SDUPTHER

## 2021-05-11 NOTE — TELEPHONE ENCOUNTER
Last visit 02/19/2021 MD Margarette Kellogg   Next appointment 05/26/2021 MD Margarette Kellogg  Previous refill encounter(s)   11/04/2020 Glucophage #180 with 1 refill     Requested Prescriptions     Pending Prescriptions Disp Refills    metFORMIN (GLUCOPHAGE) 500 mg tablet 180 Tab 1     Sig: Take 1 Tab by mouth two (2) times daily (with meals).

## 2021-05-26 ENCOUNTER — OFFICE VISIT (OUTPATIENT)
Dept: FAMILY MEDICINE CLINIC | Age: 81
End: 2021-05-26
Payer: MEDICARE

## 2021-05-26 VITALS
HEART RATE: 55 BPM | OXYGEN SATURATION: 98 % | SYSTOLIC BLOOD PRESSURE: 124 MMHG | RESPIRATION RATE: 16 BRPM | TEMPERATURE: 98.3 F | BODY MASS INDEX: 33.26 KG/M2 | DIASTOLIC BLOOD PRESSURE: 72 MMHG | WEIGHT: 194.8 LBS | HEIGHT: 64 IN

## 2021-05-26 DIAGNOSIS — Z13.39 SCREENING FOR ALCOHOLISM: ICD-10-CM

## 2021-05-26 DIAGNOSIS — E11.21 TYPE 2 DIABETES MELLITUS WITH NEPHROPATHY (HCC): ICD-10-CM

## 2021-05-26 DIAGNOSIS — C67.9 MALIGNANT NEOPLASM OF URINARY BLADDER, UNSPECIFIED SITE (HCC): ICD-10-CM

## 2021-05-26 DIAGNOSIS — Z13.31 SCREENING FOR DEPRESSION: ICD-10-CM

## 2021-05-26 DIAGNOSIS — K21.9 GASTROESOPHAGEAL REFLUX DISEASE, UNSPECIFIED WHETHER ESOPHAGITIS PRESENT: ICD-10-CM

## 2021-05-26 DIAGNOSIS — R79.89 ELEVATED SERUM CREATININE: ICD-10-CM

## 2021-05-26 DIAGNOSIS — I25.10 CORONARY ARTERY DISEASE INVOLVING NATIVE CORONARY ARTERY OF NATIVE HEART WITHOUT ANGINA PECTORIS: ICD-10-CM

## 2021-05-26 DIAGNOSIS — Z00.00 MEDICARE ANNUAL WELLNESS VISIT, SUBSEQUENT: Primary | ICD-10-CM

## 2021-05-26 PROCEDURE — G8427 DOCREV CUR MEDS BY ELIG CLIN: HCPCS | Performed by: FAMILY MEDICINE

## 2021-05-26 PROCEDURE — G8754 DIAS BP LESS 90: HCPCS | Performed by: FAMILY MEDICINE

## 2021-05-26 PROCEDURE — G0439 PPPS, SUBSEQ VISIT: HCPCS | Performed by: FAMILY MEDICINE

## 2021-05-26 PROCEDURE — G8417 CALC BMI ABV UP PARAM F/U: HCPCS | Performed by: FAMILY MEDICINE

## 2021-05-26 PROCEDURE — G0442 ANNUAL ALCOHOL SCREEN 15 MIN: HCPCS | Performed by: FAMILY MEDICINE

## 2021-05-26 PROCEDURE — 1101F PT FALLS ASSESS-DOCD LE1/YR: CPT | Performed by: FAMILY MEDICINE

## 2021-05-26 PROCEDURE — G8752 SYS BP LESS 140: HCPCS | Performed by: FAMILY MEDICINE

## 2021-05-26 PROCEDURE — 3051F HG A1C>EQUAL 7.0%<8.0%: CPT | Performed by: FAMILY MEDICINE

## 2021-05-26 PROCEDURE — G8510 SCR DEP NEG, NO PLAN REQD: HCPCS | Performed by: FAMILY MEDICINE

## 2021-05-26 PROCEDURE — G8536 NO DOC ELDER MAL SCRN: HCPCS | Performed by: FAMILY MEDICINE

## 2021-05-26 NOTE — PROGRESS NOTES
This is the Subsequent Medicare Annual Wellness Exam, performed 12 months or more after the Initial AWV or the last Subsequent AWV    I have reviewed the patient's medical history in detail and updated the computerized patient record. Bladder Cancer: has follow up apt on 7/7/21 with urology. Last visit was 4/6/21 with good report. Scalp has healed with no pain  GERD: he reports that this has been well controlled  DM2:  FBG has been around 160s. Dose of Januvia decreased after last visit  CAD: , no recent CP  Elevated Creatinine: not taking NSAIDs  Follows with Dr. Mayte Martinez (eye doctor) every 6 months. Reports has follow up in June. Lab Results   Component Value Date/Time    Hemoglobin A1c 6.8 (H) 11/24/2020 08:43 AM     Lab Results   Component Value Date/Time    Cholesterol, total 178 11/24/2020 08:43 AM    HDL Cholesterol 50 11/24/2020 08:43 AM    LDL, calculated 95 11/24/2020 08:43 AM    VLDL, calculated 33 11/24/2020 08:43 AM    Triglyceride 165 (H) 11/24/2020 08:43 AM    CHOL/HDL Ratio 3.6 11/24/2020 08:43 AM       Lab Results   Component Value Date/Time    Microalbumin/Creat ratio (mg/g creat) 184 (H) 11/24/2020 08:43 AM    Microalbumin,urine random 13.00 11/24/2020 08:43 AM         Assessment/Plan   Education and counseling provided:  Are appropriate based on today's review and evaluation      ICD-10-CM ICD-9-CM    1. Medicare annual wellness visit, subsequent  Z00.00 V70.0    2. Malignant neoplasm of urinary bladder, unspecified site (HCC)  C67.9 188.9    3. Coronary artery disease involving native coronary artery of native heart without angina pectoris  I25.10 414.01    4.  Type 2 diabetes mellitus with nephropathy (HCC)  E11.21 250.40 CBC W/O DIFF     396.95 METABOLIC PANEL, COMPREHENSIVE      HEMOGLOBIN A1C WITH EAG      TSH 3RD GENERATION      MICROALBUMIN, UR, RAND W/ MICROALB/CREAT RATIO      MICROALBUMIN, UR, RAND W/ MICROALB/CREAT RATIO      TSH 3RD GENERATION      HEMOGLOBIN A1C WITH EAG METABOLIC PANEL, COMPREHENSIVE      CBC W/O DIFF   5. Screening for alcoholism  Z13.39 V79.1 AK ANNUAL ALCOHOL SCREEN 15 MIN   6. Screening for depression  Z13.31 V79.0 DEPRESSION SCREEN ANNUAL     · Medicare Wellness Exam: Reviewed and addressed patient's medical history and concerns as discussed in note. Reviewed recommended screenings and immunizations. Discussed recommendations for diet, exercise, and lifestyle. · DM2: Chronic, stable. If kidney function consistently falls below 45, will stop metformn and start Glipizide. Check labs as above. · Increased Serum Creatinine: Chronic, will recheck kidney function. Discussed avoidance of nephrotoxic medications such as NSAIDS  All other conditions listed above: chronic and stable. Will continue current treatment regimen. Will check labs as reflected above. Discussed following up with specialist as scheduled. Discussed recommendations for diet and exercise. Depression Risk Factor Screening     3 most recent PHQ Screens 2021   Little interest or pleasure in doing things Not at all   Feeling down, depressed, irritable, or hopeless Not at all   Total Score PHQ 2 0       Alcohol Risk Screen    Do you average more than 1 drink per night or more than 7 drinks a week: No    In the past three months have you have had more than 4 drinks containing alcohol on one occasion: No        Functional Ability and Level of Safety    Hearin/10, not interested in hearing aids      Activities of Daily Living: The home contains: handrails  Patient does total self care      Ambulation: with no difficulty     Fall Risk:  Fall Risk Assessment, last 12 mths 2021   Able to walk? Yes   Fall in past 12 months? 0   Do you feel unsteady? 0   Are you worried about falling 0   Number of falls in past 12 months -   Fall with injury?  -      Abuse Screen:  Patient is not abused       Cognitive Screening    Has your family/caregiver stated any concerns about your memory: no Cognitive Screening: normal    Health Maintenance Due     Health Maintenance Due   Topic Date Due    COVID-19 Vaccine (1) Never done    Foot Exam Q1  05/22/2021       Patient Care Team   Patient Care Team:  Harsh Ramey MD as PCP - General (Family Medicine)  Harsh Ramey MD as PCP - [facility]  Marium De La Fuente MD (Ophthalmology)  Dipak Sanchez MD (Ophthalmology)  Ayaka Tavares DPM ([employer])  Gurdeep Davis MD (Orthopedic Surgery)  Cecy Aguirre MD (Cardiology)  Roshan Carter MD (Urology)    History     Patient Active Problem List   Diagnosis Code    Chronic foot pain, right M79.671, G89.29    Essential hypertension I10    Mixed hyperlipidemia E78.2    Type 2 diabetes mellitus with nephropathy ([facility] [address] E11.21    Microalbuminuria R80.9    Elevated serum creatinine R79.89    Chronic ankle pain, bilateral M25.571, G89.29, M25.572    Foot pain, bilateral M79.671, M79.672    Urinary hesitancy R39.11    History of gastritis Z87.19    Psoriasis L40.9    Chronic knee pain M25.569, G89.29    Obesity (BMI 30.0-34. 9) E66.9    Snoring R06.83    Coronary artery disease involving native heart without angina pectoris I25.10    Bladder cancer (HCC) C67.9     Past Medical History:   Diagnosis Date    Diabetes ([facility] [address]     type 2; niddm    Hypertension     Ill-defined condition     HIGH CHOLESTEROL      Past Surgical History:   Procedure Laterality Date    HX APPENDECTOMY  1956    HX CATARACT REMOVAL Right     HX COLONOSCOPY  2013    COLONOSCOPY    HX COLONOSCOPY  06/2018    repeat 3 years    HX ENDOSCOPY  02/2018    h pylori moderate chronic gastritis    HX ENDOSCOPY  01/08/2020    Dr. Michael Wright HX HEENT Bilateral     STRABISMUS CORRECTION    HX UROLOGICAL  10/31/2019    normal cystosocpy    OR CARDIAC SURG PROCEDURE UNLIST  03/14/2018    2 stents placed      Current Outpatient Medications   Medication Sig Dispense Refill    metFORMIN (GLUCOPHAGE) 500 mg tablet Take 1 Tab by mouth two (2) times daily (with meals). 180 Tab 1    amLODIPine (NORVASC) 10 mg tablet Take 1 Tab by mouth nightly. 90 Tab 1    atorvastatin (LIPITOR) 40 mg tablet Take 1 Tab by mouth daily. 90 Tab 1    hydroCHLOROthiazide (HYDRODIURIL) 12.5 mg tablet Take 1 Tab by mouth daily. 90 Tab 1    losartan (COZAAR) 100 mg tablet Take 1 Tab by mouth daily. 90 Tab 1    metoprolol succinate (TOPROL-XL) 25 mg XL tablet Take 1 Tab by mouth daily. 90 Tab 1    SITagliptin (JANUVIA) 50 mg tablet Take 1 Tab by mouth daily. Lower dose 90 Tab 1    omeprazole (PRILOSEC) 40 mg capsule Take 40 mg by mouth daily. Take 30 min Before breakfast      glucose blood VI test strips (Contour Next Test Strips) strip USE TO TEST ONCE DAILY 100 Strip 3    aspirin delayed-release 81 mg tablet 1 pill Mon, Wed, Fri      VIT A/VIT C/VIT E/ZINC/COPPER (OCUVITE PRESERVISION PO) Take 1 Tab by mouth daily.  ondansetron (ZOFRAN ODT) 4 mg disintegrating tablet Take 1 Tab by mouth every eight (8) hours as needed for Nausea.  (Patient not taking: Reported on 2021) 21 Tab 2     No Known Allergies    Family History   Problem Relation Age of Onset    Diabetes Mother     Hypertension Mother     Other Father          age 55, [de-identified] stroke    Diabetes Sister     Hypertension Sister     Anesth Problems Neg Hx      Social History     Tobacco Use    Smoking status: Former Smoker     Packs/day: 0.75     Years: 25.00     Pack years: 18.75     Quit date: 2004     Years since quittin.4    Smokeless tobacco: Never Used   Substance Use Topics    Alcohol use: No         Tana Isaac MD

## 2021-05-26 NOTE — PROGRESS NOTES
Chief Complaint   Patient presents with   [address] [place] [address]     1. Have you been to the ER, urgent care clinic since your last visit? Hospitalized since your last visit? No    2. Have you seen or consulted any other health care providers outside of the [address] [place] [address] since your last visit? Include any pap smears or colon screening.  Yes Where: Dr Tiffany Ennis 4/9 [facility]   Urologist Dr Neville Colmenares 4/6  Eye exam next month Dr Rip Denton @ [facility]      Due AWV

## 2021-05-28 LAB
ALBUMIN SERPL-MCNC: 4 G/DL (ref 3.5–5)
ALBUMIN/GLOB SERPL: 1.3 {RATIO} (ref 1.1–2.2)
ALP SERPL-CCNC: 97 U/L (ref 45–117)
ALT SERPL-CCNC: 29 U/L (ref 12–78)
ANION GAP SERPL CALC-SCNC: 8 MMOL/L (ref 5–15)
AST SERPL-CCNC: 20 U/L (ref 15–37)
BILIRUB SERPL-MCNC: 0.5 MG/DL (ref 0.2–1)
BUN SERPL-MCNC: 23 MG/DL (ref 6–20)
BUN/CREAT SERPL: 18 (ref 12–20)
CALCIUM SERPL-MCNC: 9.6 MG/DL (ref 8.5–10.1)
CHLORIDE SERPL-SCNC: 106 MMOL/L (ref 97–108)
CO2 SERPL-SCNC: 26 MMOL/L (ref 21–32)
CREAT SERPL-MCNC: 1.26 MG/DL (ref 0.7–1.3)
CREAT UR-MCNC: 117 MG/DL
ERYTHROCYTE [DISTWIDTH] IN BLOOD BY AUTOMATED COUNT: 15.3 % (ref 11.5–14.5)
EST. AVERAGE GLUCOSE BLD GHB EST-MCNC: 154 MG/DL
GLOBULIN SER CALC-MCNC: 3.2 G/DL (ref 2–4)
GLUCOSE SERPL-MCNC: 128 MG/DL (ref 65–100)
HBA1C MFR BLD: 7 % (ref 4–5.6)
HCT VFR BLD AUTO: 47.1 % (ref 36.6–50.3)
HGB BLD-MCNC: 14.4 G/DL (ref 12.1–17)
MCH RBC QN AUTO: 26 PG (ref 26–34)
MCHC RBC AUTO-ENTMCNC: 30.6 G/DL (ref 30–36.5)
MCV RBC AUTO: 85 FL (ref 80–99)
MICROALBUMIN UR-MCNC: 29.9 MG/DL
MICROALBUMIN/CREAT UR-RTO: 256 MG/G (ref 0–30)
NRBC # BLD: 0 K/UL (ref 0–0.01)
NRBC BLD-RTO: 0 PER 100 WBC
PLATELET # BLD AUTO: 189 K/UL (ref 150–400)
PMV BLD AUTO: 11.9 FL (ref 8.9–12.9)
POTASSIUM SERPL-SCNC: 4.3 MMOL/L (ref 3.5–5.1)
PROT SERPL-MCNC: 7.2 G/DL (ref 6.4–8.2)
RBC # BLD AUTO: 5.54 M/UL (ref 4.1–5.7)
SODIUM SERPL-SCNC: 140 MMOL/L (ref 136–145)
TSH SERPL DL<=0.05 MIU/L-ACNC: 0.82 UIU/ML (ref 0.36–3.74)
WBC # BLD AUTO: 8.7 K/UL (ref 4.1–11.1)

## 2021-06-01 PROBLEM — K21.9 GASTROESOPHAGEAL REFLUX DISEASE: Status: ACTIVE | Noted: 2021-06-01

## 2021-06-21 RX ORDER — BLOOD SUGAR DIAGNOSTIC
STRIP MISCELLANEOUS
Qty: 100 STRIP | Refills: 3 | Status: SHIPPED | OUTPATIENT
Start: 2021-06-21 | End: 2022-07-07 | Stop reason: SDUPTHER

## 2021-06-21 NOTE — TELEPHONE ENCOUNTER
Last visit 05/26/2021 MD Jeff Martinez   Next appointment 11/19/2021 MD Jeff Martinez   Previous refill encounter(s)   05/18/2020 Contour Next TS #100 with 3 refills     Requested Prescriptions     Pending Prescriptions Disp Refills    glucose blood VI test strips (Contour Next Test Strips) strip 100 Strip 3     Sig: USE TO TEST ONCE DAILY

## 2021-06-22 ENCOUNTER — TELEPHONE (OUTPATIENT)
Dept: FAMILY MEDICINE CLINIC | Age: 81
End: 2021-06-22

## 2021-06-22 NOTE — TELEPHONE ENCOUNTER
Pt's pharmacy called stating they just received contour next strips but insurance does not cover them. Pt states they want to use those specific strips.  Pharmacy is wondering if we can do a prior auth    glucose blood VI test strips (Contour Next Test Strips) strip [[id number]]          Pt is completely out of strips

## 2021-06-25 NOTE — TELEPHONE ENCOUNTER
Pharmacy called checking the status of the contour next test strips that need prior authorization.     Best call back #[phone]

## 2021-06-28 ENCOUNTER — TELEPHONE (OUTPATIENT)
Dept: FAMILY MEDICINE CLINIC | Age: 81
End: 2021-06-28

## 2021-06-28 NOTE — TELEPHONE ENCOUNTER
Additional request for test strips change from [employer]. PA was initiated.   Thanks, kp

## 2021-06-28 NOTE — TELEPHONE ENCOUNTER
Chief Complaint   Patient presents with    Prior Auth     CONTOUR NEXT TEST STRIPS :  PRIOR AUTHORIZATION RESOLVED.       Bianca Hernadez and it was confirmed that Contour Next test strips were dispensed 6/29/2021 at no cost.  Christiano Avila LPN

## 2021-06-29 NOTE — TELEPHONE ENCOUNTER
Chief Complaint   Patient presents with    Prior Auth     Prior authorization completed on Contour Next test strips. Response PA is resolved . Contacted [facility] to confirm if patient has received Contour Next test strips. Patient has received test strips at no cost.   One Touch is preferred the Contour Next was dispensed.   Alexia Nielsen LPN

## 2021-08-17 NOTE — TELEPHONE ENCOUNTER
Last visit 05/06/2021 MD Tommie Barrow   Next appointment 11/19/2021 MD Tommie Barrow   Previous refill encounter(s)  11/25/2020 Januvia #90 with 1 refill     Requested Prescriptions     Pending Prescriptions Disp Refills    SITagliptin (JANUVIA) 50 mg tablet 90 Tablet 1     Sig: Take 1 Tablet by mouth daily.  Lower dose

## 2021-10-15 RX ORDER — HYDROCHLOROTHIAZIDE 12.5 MG/1
12.5 TABLET ORAL DAILY
Qty: 90 TABLET | Refills: 1 | Status: SHIPPED | OUTPATIENT
Start: 2021-10-15 | End: 2022-04-04 | Stop reason: SDUPTHER

## 2021-10-15 RX ORDER — METOPROLOL SUCCINATE 25 MG/1
25 TABLET, EXTENDED RELEASE ORAL DAILY
Qty: 90 TABLET | Refills: 1 | Status: SHIPPED | OUTPATIENT
Start: 2021-10-15 | End: 2022-04-11 | Stop reason: SDUPTHER

## 2021-10-15 RX ORDER — LOSARTAN POTASSIUM 100 MG/1
100 TABLET ORAL DAILY
Qty: 90 TABLET | Refills: 1 | Status: SHIPPED | OUTPATIENT
Start: 2021-10-15 | End: 2022-04-04 | Stop reason: SDUPTHER

## 2021-10-15 RX ORDER — ATORVASTATIN CALCIUM 40 MG/1
40 TABLET, FILM COATED ORAL DAILY
Qty: 90 TABLET | Refills: 1 | Status: SHIPPED | OUTPATIENT
Start: 2021-10-15 | End: 2022-04-04 | Stop reason: SDUPTHER

## 2021-10-15 RX ORDER — AMLODIPINE BESYLATE 10 MG/1
10 TABLET ORAL
Qty: 90 TABLET | Refills: 1 | Status: SHIPPED | OUTPATIENT
Start: 2021-10-15 | End: 2022-04-04 | Stop reason: SDUPTHER

## 2021-10-15 NOTE — TELEPHONE ENCOUNTER
Last Visit: 5/26/21 MD Alberto García, labs completed  Next Appointment: 11/19/21 MD Alberto García  Previous Refill Encounter(s): 4/13/21 (all 90 + 1)    Requested Prescriptions     Pending Prescriptions Disp Refills    amLODIPine (NORVASC) 10 mg tablet 90 Tablet 1     Sig: Take 1 Tablet by mouth nightly.  atorvastatin (LIPITOR) 40 mg tablet 90 Tablet 1     Sig: Take 1 Tablet by mouth daily.  hydroCHLOROthiazide (HYDRODIURIL) 12.5 mg tablet 90 Tablet 1     Sig: Take 1 Tablet by mouth daily.  losartan (COZAAR) 100 mg tablet 90 Tablet 1     Sig: Take 1 Tablet by mouth daily.  metoprolol succinate (TOPROL-XL) 25 mg XL tablet 90 Tablet 1     Sig: Take 1 Tablet by mouth daily.

## 2021-11-19 ENCOUNTER — OFFICE VISIT (OUTPATIENT)
Dept: FAMILY MEDICINE CLINIC | Age: 81
End: 2021-11-19
Payer: MEDICARE

## 2021-11-19 VITALS
RESPIRATION RATE: 16 BRPM | OXYGEN SATURATION: 97 % | HEART RATE: 55 BPM | HEIGHT: 64 IN | WEIGHT: 192 LBS | DIASTOLIC BLOOD PRESSURE: 70 MMHG | TEMPERATURE: 97.7 F | BODY MASS INDEX: 32.78 KG/M2 | SYSTOLIC BLOOD PRESSURE: 135 MMHG

## 2021-11-19 DIAGNOSIS — I10 ESSENTIAL HYPERTENSION: ICD-10-CM

## 2021-11-19 DIAGNOSIS — K21.9 GASTROESOPHAGEAL REFLUX DISEASE, UNSPECIFIED WHETHER ESOPHAGITIS PRESENT: ICD-10-CM

## 2021-11-19 DIAGNOSIS — C67.9 MALIGNANT NEOPLASM OF URINARY BLADDER, UNSPECIFIED SITE (HCC): ICD-10-CM

## 2021-11-19 DIAGNOSIS — R80.9 MICROALBUMINURIA: ICD-10-CM

## 2021-11-19 DIAGNOSIS — I25.10 CORONARY ARTERY DISEASE INVOLVING NATIVE CORONARY ARTERY OF NATIVE HEART WITHOUT ANGINA PECTORIS: ICD-10-CM

## 2021-11-19 DIAGNOSIS — E11.21 TYPE 2 DIABETES MELLITUS WITH NEPHROPATHY (HCC): Primary | ICD-10-CM

## 2021-11-19 DIAGNOSIS — Z87.19 HISTORY OF GASTRITIS: ICD-10-CM

## 2021-11-19 LAB
ALBUMIN SERPL-MCNC: 3.7 G/DL (ref 3.5–5)
ALBUMIN/GLOB SERPL: 1.2 {RATIO} (ref 1.1–2.2)
ALP SERPL-CCNC: 95 U/L (ref 45–117)
ALT SERPL-CCNC: 29 U/L (ref 12–78)
ANION GAP SERPL CALC-SCNC: 5 MMOL/L (ref 5–15)
AST SERPL-CCNC: 17 U/L (ref 15–37)
BILIRUB SERPL-MCNC: 0.6 MG/DL (ref 0.2–1)
BUN SERPL-MCNC: 25 MG/DL (ref 6–20)
BUN/CREAT SERPL: 20 (ref 12–20)
CALCIUM SERPL-MCNC: 10 MG/DL (ref 8.5–10.1)
CHLORIDE SERPL-SCNC: 107 MMOL/L (ref 97–108)
CHOLEST SERPL-MCNC: 183 MG/DL
CO2 SERPL-SCNC: 27 MMOL/L (ref 21–32)
CREAT SERPL-MCNC: 1.28 MG/DL (ref 0.7–1.3)
ERYTHROCYTE [DISTWIDTH] IN BLOOD BY AUTOMATED COUNT: 15 % (ref 11.5–14.5)
EST. AVERAGE GLUCOSE BLD GHB EST-MCNC: 163 MG/DL
GLOBULIN SER CALC-MCNC: 3.2 G/DL (ref 2–4)
GLUCOSE SERPL-MCNC: 159 MG/DL (ref 65–100)
HBA1C MFR BLD: 7.3 % (ref 4–5.6)
HCT VFR BLD AUTO: 45.1 % (ref 36.6–50.3)
HDLC SERPL-MCNC: 51 MG/DL
HDLC SERPL: 3.6 {RATIO} (ref 0–5)
HGB BLD-MCNC: 14.7 G/DL (ref 12.1–17)
LDLC SERPL CALC-MCNC: 92 MG/DL (ref 0–100)
MCH RBC QN AUTO: 25.9 PG (ref 26–34)
MCHC RBC AUTO-ENTMCNC: 32.6 G/DL (ref 30–36.5)
MCV RBC AUTO: 79.5 FL (ref 80–99)
NRBC # BLD: 0 K/UL (ref 0–0.01)
NRBC BLD-RTO: 0 PER 100 WBC
PLATELET # BLD AUTO: 168 K/UL (ref 150–400)
PMV BLD AUTO: 10.7 FL (ref 8.9–12.9)
POTASSIUM SERPL-SCNC: 4.1 MMOL/L (ref 3.5–5.1)
PROT SERPL-MCNC: 6.9 G/DL (ref 6.4–8.2)
RBC # BLD AUTO: 5.67 M/UL (ref 4.1–5.7)
SODIUM SERPL-SCNC: 139 MMOL/L (ref 136–145)
TRIGL SERPL-MCNC: 200 MG/DL (ref ?–150)
VLDLC SERPL CALC-MCNC: 40 MG/DL
WBC # BLD AUTO: 8.9 K/UL (ref 4.1–11.1)

## 2021-11-19 PROCEDURE — G8536 NO DOC ELDER MAL SCRN: HCPCS | Performed by: FAMILY MEDICINE

## 2021-11-19 PROCEDURE — 1101F PT FALLS ASSESS-DOCD LE1/YR: CPT | Performed by: FAMILY MEDICINE

## 2021-11-19 PROCEDURE — G8427 DOCREV CUR MEDS BY ELIG CLIN: HCPCS | Performed by: FAMILY MEDICINE

## 2021-11-19 PROCEDURE — 3051F HG A1C>EQUAL 7.0%<8.0%: CPT | Performed by: FAMILY MEDICINE

## 2021-11-19 PROCEDURE — G8754 DIAS BP LESS 90: HCPCS | Performed by: FAMILY MEDICINE

## 2021-11-19 PROCEDURE — G8432 DEP SCR NOT DOC, RNG: HCPCS | Performed by: FAMILY MEDICINE

## 2021-11-19 PROCEDURE — G8417 CALC BMI ABV UP PARAM F/U: HCPCS | Performed by: FAMILY MEDICINE

## 2021-11-19 PROCEDURE — G0463 HOSPITAL OUTPT CLINIC VISIT: HCPCS | Performed by: FAMILY MEDICINE

## 2021-11-19 PROCEDURE — 99213 OFFICE O/P EST LOW 20 MIN: CPT | Performed by: FAMILY MEDICINE

## 2021-11-19 PROCEDURE — G8752 SYS BP LESS 140: HCPCS | Performed by: FAMILY MEDICINE

## 2021-11-19 RX ORDER — METFORMIN HYDROCHLORIDE 500 MG/1
500 TABLET ORAL 2 TIMES DAILY WITH MEALS
Qty: 180 TABLET | Refills: 1 | Status: SHIPPED | OUTPATIENT
Start: 2021-11-19 | End: 2022-04-04 | Stop reason: SDUPTHER

## 2021-11-19 NOTE — PROGRESS NOTES
Chief Complaint   Patient presents with    Follow-up        1. \"Have you been to the ER, urgent care clinic since your last visit? Hospitalized since your last visit? \" No    2. \"Have you seen or consulted any other health care providers outside of the [address] [place] [address] since your last visit? \" Yes Where: [address] [facility]     3. For patients aged 39-70: Has the patient had a colonoscopy? Yes, HM satisfied with blue hyperlink     If the patient is female:    4. For patients aged 41-77: Has the patient had a mammogram within the past 2 years? No    5. For patients aged 21-30: Has the patient had a pap smear?  No    3 most recent PHQ Screens 5/26/2021   Little interest or pleasure in doing things Not at all   Feeling down, depressed, irritable, or hopeless Not at all   Total Score PHQ 2 0

## 2021-11-19 NOTE — PROGRESS NOTES
Ta Lanier  [de-identified] y.o. male  1940  [address] [place] [id number]  [id number]     [facility]       Encounter Date: 11/19/2021           Established Patient Visit Note: America Heard MD    Reason for Appointment:  Chief Complaint   Patient presents with    Follow-up       History of Present Illness:  History provided by patient    Ta Lanier is a [de-identified] y.o. 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 Has apt in Dec with Dr. Thony Guajardo  · GERD: taking omeprazole through Dr. Maria Elena Mijares  · HTN: not checking BP at home    He states that he does not want COVID vaccine or Flu vaccine at this time. Review of Systems  Review of Systems   Constitutional: Negative for chills and fever. Respiratory: Negative for cough, shortness of breath and wheezing. Cardiovascular: Negative for chest pain and palpitations. Allergies: Patient has no known allergies. Medications: (Updated to reflect final medication list after visit)    Current Outpatient Medications:     metFORMIN (GLUCOPHAGE) 500 mg tablet, Take 1 Tablet by mouth two (2) times daily (with meals). , Disp: 180 Tablet, Rfl: 1    SITagliptin (JANUVIA) 50 mg tablet, Take 1 Tablet by mouth daily. Lower dose, Disp: 90 Tablet, Rfl: 1    amLODIPine (NORVASC) 10 mg tablet, Take 1 Tablet by mouth nightly., Disp: 90 Tablet, Rfl: 1    atorvastatin (LIPITOR) 40 mg tablet, Take 1 Tablet by mouth daily. , Disp: 90 Tablet, Rfl: 1    hydroCHLOROthiazide (HYDRODIURIL) 12.5 mg tablet, Take 1 Tablet by mouth daily. , Disp: 90 Tablet, Rfl: 1    losartan (COZAAR) 100 mg tablet, Take 1 Tablet by mouth daily. , Disp: 90 Tablet, Rfl: 1    metoprolol succinate (TOPROL-XL) 25 mg XL tablet, Take 1 Tablet by mouth daily. , Disp: 90 Tablet, Rfl: 1    glucose blood VI test strips (Contour Next Test Strips) strip, USE TO TEST ONCE DAILY, Disp: 100 Strip, Rfl: 3    omeprazole (PRILOSEC) 40 mg capsule, Take 40 mg by mouth daily. Take 30 min Before breakfast, Disp: , Rfl:     aspirin delayed-release 81 mg tablet, 1 pill Mon, Wed, Fri, Disp: , Rfl:     VIT A/VIT C/VIT E/ZINC/COPPER (OCUVITE PRESERVISION PO), Take 1 Tab by mouth daily. , Disp: , Rfl:     History  Patient Care Team:  Baltazar Mohr MD as PCP - General (Family Medicine)  Baltazar Mohr MD as PCP - [facility]  Felipe Sigala MD (Ophthalmology)  Miranda Guerin MD (Ophthalmology)  Sergey Ramirez DPM (Podiatry)  Papi Hdez MD (Orthopedic Surgery)  Moustapha Del Valle MD (Cardiology)  Karlee Griffith MD (Urology)    Past Medical History: he has a past medical history of Diabetes [facility]), Hypertension, and Ill-defined condition. Past Surgical History: he has a past surgical history that includes hx cataract removal (Right); hx heent (Bilateral); hx appendectomy (1956); hx endoscopy (02/2018); pr cardiac surg procedure unlist (03/14/2018); hx colonoscopy (2013); hx colonoscopy (06/2018); hx urological (10/31/2019); and hx endoscopy (01/08/2020). Family Medical History: family history includes Diabetes in his mother and sister; Hypertension in his mother and sister; Other in his father. Social History: he reports that he quit smoking about 17 years ago. He has a 18.75 pack-year smoking history. He has never used smokeless tobacco. He reports that he does not drink alcohol and does not use drugs.     Health Maintenance Due   Topic Date Due    COVID-19 Vaccine (1) Never done    Foot Exam Q1  05/22/2021    Flu Vaccine (1) Never done       Objective:   Visit Vitals  /70   Pulse (!) 55   Temp 97.7 °F (36.5 °C)   Resp 16   Ht 5' 4\" (1.626 m)   Wt 192 lb (87.1 kg)   SpO2 97%   BMI 32.96 kg/m²     Wt Readings from Last 3 Encounters:   11/19/21 192 lb (87.1 kg)   05/26/21 194 lb 12.8 oz (88.4 kg)   02/19/21 192 lb 12.8 oz (87.5 kg)       Physical Exam  Constitutional:       Appearance: Normal appearance. HENT:      Head: Normocephalic and atraumatic. Right Ear: External ear normal.      Left Ear: External ear normal.      Nose: Nose normal.      Mouth/Throat:      Pharynx: No oropharyngeal exudate. Eyes:      General: No scleral icterus. Right eye: No discharge. Left eye: No discharge. Conjunctiva/sclera: Conjunctivae normal.      Pupils: Pupils are equal, round, and reactive to light. Neck:      Thyroid: No thyromegaly. Vascular: No JVD. Trachea: No tracheal deviation. Cardiovascular:      Rate and Rhythm: Normal rate and regular rhythm. Heart sounds: Normal heart sounds. No murmur heard. No friction rub. No gallop. Pulmonary:      Effort: Pulmonary effort is normal. No respiratory distress. Breath sounds: Normal breath sounds. No stridor. No wheezing. Musculoskeletal:         General: No tenderness or deformity. Normal range of motion. Cervical back: Normal range of motion and neck supple. Lymphadenopathy:      Cervical: No cervical adenopathy. Skin:     General: Skin is warm and dry. Neurological:      Mental Status: He is alert. Cranial Nerves: No cranial nerve deficit. Coordination: Coordination normal.      Gait: Gait is intact. Deep Tendon Reflexes: Reflexes normal.         Assessment & Plan:      ICD-10-CM ICD-9-CM    1. Type 2 diabetes mellitus with nephropathy (HCC)  E11.21 250.40 metFORMIN (GLUCOPHAGE) 500 mg tablet     583.81 SITagliptin (JANUVIA) 50 mg tablet      CBC W/O DIFF      HEMOGLOBIN A1C WITH EAG      HEMOGLOBIN A1C WITH EAG      CBC W/O DIFF   2. Gastroesophageal reflux disease, unspecified whether esophagitis present  K21.9 530.81    3.  Malignant neoplasm of urinary bladder, unspecified site (HCC)  C67.9 188.9    4. Coronary artery disease involving native coronary artery of native heart without angina pectoris  I25.10 414.01 LIPID PANEL      METABOLIC PANEL, COMPREHENSIVE      METABOLIC PANEL, COMPREHENSIVE      LIPID PANEL   5. Essential hypertension  I10 401.9    6. History of gastritis  Z87.19 V12.79    7. Microalbuminuria  R80.9 791.0      All conditions listed above: chronic and stable. Will continue current treatment regimen. Will check labs as reflected above. Discussed following up with specialist as scheduled. Discussed recommendations for diet and exercise. I have discussed the diagnosis with the patient and the intended plan as seen in the above orders. The patient has received an after-visit summary along with patient information handout. I have discussed medication side effects and warnings with the patient as well.     Disposition  Follow-up and Dispositions    · Return in about 6 months (around 6/1/2022) for medicare wellness exam.           Bulmaro Dutta MD

## 2022-01-28 ENCOUNTER — HOSPITAL ENCOUNTER (OUTPATIENT)
Dept: VASCULAR SURGERY | Age: 82
Discharge: HOME | End: 2022-01-28
Payer: MEDICARE

## 2022-01-28 ENCOUNTER — OFFICE VISIT (OUTPATIENT)
Dept: FAMILY MEDICINE CLINIC | Age: 82
End: 2022-01-28
Payer: MEDICARE

## 2022-01-28 VITALS
WEIGHT: 193.6 LBS | HEIGHT: 64 IN | HEART RATE: 58 BPM | OXYGEN SATURATION: 96 % | TEMPERATURE: 98.3 F | DIASTOLIC BLOOD PRESSURE: 76 MMHG | SYSTOLIC BLOOD PRESSURE: 147 MMHG | RESPIRATION RATE: 16 BRPM | BODY MASS INDEX: 33.05 KG/M2

## 2022-01-28 DIAGNOSIS — M79.89 SWELLING OF LOWER LEG: Primary | ICD-10-CM

## 2022-01-28 DIAGNOSIS — M79.89 SWELLING OF LOWER LEG: ICD-10-CM

## 2022-01-28 DIAGNOSIS — E11.21 TYPE 2 DIABETES MELLITUS WITH NEPHROPATHY (HCC): ICD-10-CM

## 2022-01-28 DIAGNOSIS — E66.9 OBESITY (BMI 30.0-34.9): ICD-10-CM

## 2022-01-28 PROCEDURE — G8417 CALC BMI ABV UP PARAM F/U: HCPCS | Performed by: NURSE PRACTITIONER

## 2022-01-28 PROCEDURE — G8753 SYS BP > OR = 140: HCPCS | Performed by: NURSE PRACTITIONER

## 2022-01-28 PROCEDURE — G8754 DIAS BP LESS 90: HCPCS | Performed by: NURSE PRACTITIONER

## 2022-01-28 PROCEDURE — 99213 OFFICE O/P EST LOW 20 MIN: CPT | Performed by: NURSE PRACTITIONER

## 2022-01-28 PROCEDURE — G8432 DEP SCR NOT DOC, RNG: HCPCS | Performed by: NURSE PRACTITIONER

## 2022-01-28 PROCEDURE — 93971 EXTREMITY STUDY: CPT

## 2022-01-28 PROCEDURE — G8536 NO DOC ELDER MAL SCRN: HCPCS | Performed by: NURSE PRACTITIONER

## 2022-01-28 PROCEDURE — 1101F PT FALLS ASSESS-DOCD LE1/YR: CPT | Performed by: NURSE PRACTITIONER

## 2022-01-28 PROCEDURE — G8427 DOCREV CUR MEDS BY ELIG CLIN: HCPCS | Performed by: NURSE PRACTITIONER

## 2022-01-28 RX ORDER — VALSARTAN AND HYDROCHLOROTHIAZIDE 320; 25 MG/1; MG/1
1 TABLET, FILM COATED ORAL
COMMUNITY
End: 2022-09-28

## 2022-01-28 NOTE — PROGRESS NOTES
[facility] Note  Subjective:      Christianne Sims is a [de-identified] y.o. male who presents for left lower leg swelling discoloration and pain x 3 weeks. Patient has history of hypertension, diabetes, hyperlipidemia and obesity. Taking medications as prescribed, no medication side effects reported. Past Medical History:   Diagnosis Date    Diabetes ([address]     type 2; niddm    Hypertension     Ill-defined condition     HIGH CHOLESTEROL     Past Surgical History:   Procedure Laterality Date    HX APPENDECTOMY  1956    HX CATARACT REMOVAL Right     HX COLONOSCOPY  2013    COLONOSCOPY    HX COLONOSCOPY  06/2018    repeat 3 years    HX ENDOSCOPY  02/2018    h pylori moderate chronic gastritis    HX ENDOSCOPY  01/08/2020    Dr. Aguila Chan HX HEENT Bilateral     STRABISMUS CORRECTION    HX UROLOGICAL  10/31/2019    normal cystosocpy    ID CARDIAC SURG PROCEDURE UNLIST  03/14/2018    2 stents placed        Current Outpatient Medications   Medication Sig Dispense Refill    valsartan-hydroCHLOROthiazide (DIOVAN-HCT) 320-25 mg per tablet 1 Tablet.  metFORMIN (GLUCOPHAGE) 500 mg tablet Take 1 Tablet by mouth two (2) times daily (with meals). 180 Tablet 1    SITagliptin (JANUVIA) 50 mg tablet Take 1 Tablet by mouth daily. Lower dose 90 Tablet 1    amLODIPine (NORVASC) 10 mg tablet Take 1 Tablet by mouth nightly. 90 Tablet 1    atorvastatin (LIPITOR) 40 mg tablet Take 1 Tablet by mouth daily. 90 Tablet 1    hydroCHLOROthiazide (HYDRODIURIL) 12.5 mg tablet Take 1 Tablet by mouth daily. 90 Tablet 1    losartan (COZAAR) 100 mg tablet Take 1 Tablet by mouth daily. 90 Tablet 1    metoprolol succinate (TOPROL-XL) 25 mg XL tablet Take 1 Tablet by mouth daily. 90 Tablet 1    glucose blood VI test strips (Contour Next Test Strips) strip USE TO TEST ONCE DAILY 100 Strip 3    omeprazole (PRILOSEC) 40 mg capsule Take 40 mg by mouth daily.  Take 30 min Before breakfast      aspirin delayed-release 81 mg tablet 1 pill Mon, Wed, Fri      VIT A/VIT C/VIT E/ZINC/COPPER (OCUVITE PRESERVISION PO) Take 1 Tab by mouth daily. No Known Allergies    ROS:   Complete review of systems was reviewed with pertinent information listed in HPI. Review of Systems   Constitutional: Negative. HENT: Negative. Respiratory: Negative. Cardiovascular: Positive for leg swelling. Negative for chest pain, palpitations, orthopnea, claudication and PND. Gastrointestinal: Negative. Genitourinary: Negative. Musculoskeletal: Negative. Objective:     Visit Vitals  BP (!) 147/76 (BP 1 Location: Right upper arm, BP Patient Position: Sitting, BP Cuff Size: Adult)   Pulse (!) 58   Temp 98.3 °F (36.8 °C) (Temporal)   Resp 16   Ht 5' 4\" (1.626 m)   Wt 193 lb 9.6 oz (87.8 kg)   SpO2 96%   BMI 33.23 kg/m²       Vitals and Nurse Documentation reviewed. Physical Exam  Constitutional:       Appearance: Normal appearance. HENT:      Mouth/Throat:      Mouth: Mucous membranes are moist.   Cardiovascular:      Rate and Rhythm: Normal rate and regular rhythm. Pulses: Normal pulses. Heart sounds: Normal heart sounds. No murmur heard. Comments: Swelling of left lower leg,   Pulmonary:      Effort: Pulmonary effort is normal.      Breath sounds: Normal breath sounds. Abdominal:      General: Bowel sounds are normal.      Palpations: Abdomen is soft. Musculoskeletal:      Cervical back: Normal range of motion and neck supple. Neurological:      Mental Status: He is alert. Psychiatric:         Mood and Affect: Mood normal.         Thought Content: Thought content normal.         Assessment/Plan:     Diagnoses and all orders for this visit:    1. Swelling of lower leg  -     DUPLEX LOWER EXT VENOUS LEFT; Future    2. Type 2 diabetes mellitus with nephropathy (HCC)    3. Obesity (BMI 30.0-34. 9)          Pt expressed understanding with the diagnosis and plan        Discussed expected course/resolution/complications of diagnosis in detail with patient.    Medication risks/benefits/costs/interactions/alternatives discussed with patient.    Pt was given an after visit summary which includes diagnoses, current medications & vitals.  Pt expressed understanding with the diagnosis and plan

## 2022-01-28 NOTE — PROGRESS NOTES
Chief Complaint   Patient presents with    Skin Problem     bruising on left leg x3 weeks, hard and slightly tender to touch         1. \"Have you been to the ER, urgent care clinic since your last visit? Hospitalized since your last visit? \" No    2. \"Have you seen or consulted any other health care providers outside of the [address] [place] [address] since your last visit? \" Yes When: 1/11 Where: dr. Dalila Nair Reason for visit: CT scan urologist    3. For patients over 45: Has the patient had a colonoscopy? Yes - no Care Gap present     Have you had the covid vaccine. .. no  Pt declined flu shot      3 most recent PHQ Screens 1/28/2022   Little interest or pleasure in doing things Not at all   Feeling down, depressed, irritable, or hopeless Not at all   Total Score PHQ 2 0       Health Maintenance Due   Topic Date Due    COVID-19 Vaccine (1) Never done    Foot Exam Q1  05/22/2021    Flu Vaccine (1) Never done

## 2022-03-18 PROBLEM — I25.10 CORONARY ARTERY DISEASE INVOLVING NATIVE HEART WITHOUT ANGINA PECTORIS: Status: ACTIVE | Noted: 2019-05-17

## 2022-03-18 PROBLEM — K21.9 GASTROESOPHAGEAL REFLUX DISEASE: Status: ACTIVE | Noted: 2021-06-01

## 2022-03-18 PROBLEM — C67.9 BLADDER CANCER (HCC): Status: ACTIVE | Noted: 2019-06-10

## 2022-03-19 PROBLEM — E66.9 OBESITY (BMI 30.0-34.9): Status: ACTIVE | Noted: 2019-02-15

## 2022-03-19 PROBLEM — G89.29 CHRONIC ANKLE PAIN, BILATERAL: Status: ACTIVE | Noted: 2018-04-10

## 2022-03-19 PROBLEM — G89.29 CHRONIC FOOT PAIN, RIGHT: Status: ACTIVE | Noted: 2018-01-02

## 2022-03-19 PROBLEM — E78.2 MIXED HYPERLIPIDEMIA: Status: ACTIVE | Noted: 2018-01-02

## 2022-03-19 PROBLEM — E66.811 OBESITY (BMI 30.0-34.9): Status: ACTIVE | Noted: 2019-02-15

## 2022-03-19 PROBLEM — R06.83 SNORING: Status: ACTIVE | Noted: 2019-02-15

## 2022-03-19 PROBLEM — M79.672 FOOT PAIN, BILATERAL: Status: ACTIVE | Noted: 2018-04-10

## 2022-03-19 PROBLEM — M79.671 CHRONIC FOOT PAIN, RIGHT: Status: ACTIVE | Noted: 2018-01-02

## 2022-03-19 PROBLEM — R39.11 URINARY HESITANCY: Status: ACTIVE | Noted: 2018-04-10

## 2022-03-19 PROBLEM — M25.571 CHRONIC ANKLE PAIN, BILATERAL: Status: ACTIVE | Noted: 2018-04-10

## 2022-03-19 PROBLEM — M25.572 CHRONIC ANKLE PAIN, BILATERAL: Status: ACTIVE | Noted: 2018-04-10

## 2022-03-19 PROBLEM — L40.9 PSORIASIS: Status: ACTIVE | Noted: 2018-08-16

## 2022-03-19 PROBLEM — I10 ESSENTIAL HYPERTENSION: Status: ACTIVE | Noted: 2018-01-02

## 2022-03-19 PROBLEM — Z87.19 HISTORY OF GASTRITIS: Status: ACTIVE | Noted: 2018-04-18

## 2022-03-19 PROBLEM — R80.9 MICROALBUMINURIA: Status: ACTIVE | Noted: 2018-01-04

## 2022-03-19 PROBLEM — G89.29 CHRONIC KNEE PAIN: Status: ACTIVE | Noted: 2018-11-15

## 2022-03-19 PROBLEM — M79.671 FOOT PAIN, BILATERAL: Status: ACTIVE | Noted: 2018-04-10

## 2022-03-19 PROBLEM — M25.569 CHRONIC KNEE PAIN: Status: ACTIVE | Noted: 2018-11-15

## 2022-03-19 PROBLEM — E11.21 TYPE 2 DIABETES MELLITUS WITH NEPHROPATHY (HCC): Status: ACTIVE | Noted: 2018-01-04

## 2022-03-20 PROBLEM — R79.89 ELEVATED SERUM CREATININE: Status: ACTIVE | Noted: 2018-01-06

## 2022-04-04 DIAGNOSIS — E11.21 TYPE 2 DIABETES MELLITUS WITH NEPHROPATHY (HCC): ICD-10-CM

## 2022-04-04 RX ORDER — METFORMIN HYDROCHLORIDE 500 MG/1
500 TABLET ORAL 2 TIMES DAILY WITH MEALS
Qty: 180 TABLET | Refills: 1 | Status: SHIPPED | OUTPATIENT
Start: 2022-04-04 | End: 2022-09-28 | Stop reason: SDUPTHER

## 2022-04-04 RX ORDER — LOSARTAN POTASSIUM 100 MG/1
100 TABLET ORAL DAILY
Qty: 90 TABLET | Refills: 1 | Status: SHIPPED | OUTPATIENT
Start: 2022-04-04 | End: 2022-09-28 | Stop reason: SDUPTHER

## 2022-04-04 RX ORDER — ATORVASTATIN CALCIUM 40 MG/1
40 TABLET, FILM COATED ORAL DAILY
Qty: 90 TABLET | Refills: 1 | Status: SHIPPED | OUTPATIENT
Start: 2022-04-04 | End: 2022-09-28 | Stop reason: SDUPTHER

## 2022-04-04 RX ORDER — HYDROCHLOROTHIAZIDE 12.5 MG/1
12.5 TABLET ORAL DAILY
Qty: 90 TABLET | Refills: 1 | Status: SHIPPED | OUTPATIENT
Start: 2022-04-04 | End: 2022-09-28 | Stop reason: SDUPTHER

## 2022-04-04 RX ORDER — AMLODIPINE BESYLATE 10 MG/1
10 TABLET ORAL
Qty: 90 TABLET | Refills: 1 | Status: SHIPPED | OUTPATIENT
Start: 2022-04-04 | End: 2022-09-28 | Stop reason: SDUPTHER

## 2022-04-04 NOTE — TELEPHONE ENCOUNTER
Last Visit: 1/28/22 NP Mervat, lipid 11/2021  Next Appointment: 6/1/22 MD Ken  Previous Refill Encounter(s): 10/15/21 (all)  Amlodipine 90 + 1  atovastatin  90 + 1  hctz 90 + 1  Losartan 90 + 1  Metformin 180 + 1    Requested Prescriptions     Pending Prescriptions Disp Refills    amLODIPine (NORVASC) 10 mg tablet 90 Tablet 1     Sig: Take 1 Tablet by mouth nightly.  atorvastatin (LIPITOR) 40 mg tablet 90 Tablet 1     Sig: Take 1 Tablet by mouth daily.  hydroCHLOROthiazide (HYDRODIURIL) 12.5 mg tablet 90 Tablet 1     Sig: Take 1 Tablet by mouth daily.  losartan (COZAAR) 100 mg tablet 90 Tablet 1     Sig: Take 1 Tablet by mouth daily.  metFORMIN (GLUCOPHAGE) 500 mg tablet 180 Tablet 1     Sig: Take 1 Tablet by mouth two (2) times daily (with meals).

## 2022-04-06 LAB — SARS-COV-2, NAA: NEGATIVE

## 2022-04-11 RX ORDER — METOPROLOL SUCCINATE 25 MG/1
25 TABLET, EXTENDED RELEASE ORAL DAILY
Qty: 90 TABLET | Refills: 1 | Status: SHIPPED | OUTPATIENT
Start: 2022-04-11 | End: 2022-09-28 | Stop reason: SDUPTHER

## 2022-04-11 NOTE — TELEPHONE ENCOUNTER
Last Visit: 1/28/22 JOHNNY Crowell  Next Appointment: 6/1/22 MD Nacho Moore  Previous Refill Encounter(s): 10/15/21 90 + 1    Requested Prescriptions     Pending Prescriptions Disp Refills    metoprolol succinate (TOPROL-XL) 25 mg XL tablet 90 Tablet 1     Sig: Take 1 Tablet by mouth daily.

## 2022-05-09 ENCOUNTER — TELEPHONE (OUTPATIENT)
Dept: FAMILY MEDICINE CLINIC | Age: 82
End: 2022-05-09

## 2022-05-09 DIAGNOSIS — E11.21 TYPE 2 DIABETES MELLITUS WITH NEPHROPATHY (HCC): Primary | ICD-10-CM

## 2022-05-09 NOTE — TELEPHONE ENCOUNTER
Kelli from [place] called and stated a new prescription is needed for the patient for Lancet 90 day supply    Best call back# [phone]    Fax# [phone]

## 2022-05-10 RX ORDER — LANCETS
EACH MISCELLANEOUS
Qty: 100 EACH | Refills: 11 | Status: SHIPPED | OUTPATIENT
Start: 2022-05-10

## 2022-05-10 NOTE — TELEPHONE ENCOUNTER
ICD-10-CM ICD-9-CM    1.  Type 2 diabetes mellitus with nephropathy [facility]  E11.21 250.40 lancets misc     583.81      Jenifer Monaco MD

## 2022-05-10 NOTE — TELEPHONE ENCOUNTER
Kelli from [place] called again in regards to a new prescription needed for Lancet 90 day supply for the patient     Best call back# [phone]     Fax# [phone]

## 2022-06-01 ENCOUNTER — OFFICE VISIT (OUTPATIENT)
Dept: FAMILY MEDICINE CLINIC | Age: 82
End: 2022-06-01
Payer: MEDICARE

## 2022-06-01 VITALS
RESPIRATION RATE: 16 BRPM | DIASTOLIC BLOOD PRESSURE: 70 MMHG | HEART RATE: 52 BPM | SYSTOLIC BLOOD PRESSURE: 112 MMHG | TEMPERATURE: 97.7 F | HEIGHT: 64 IN | WEIGHT: 184 LBS | BODY MASS INDEX: 31.41 KG/M2 | OXYGEN SATURATION: 97 %

## 2022-06-01 DIAGNOSIS — K21.9 GASTROESOPHAGEAL REFLUX DISEASE, UNSPECIFIED WHETHER ESOPHAGITIS PRESENT: ICD-10-CM

## 2022-06-01 DIAGNOSIS — C67.9 MALIGNANT NEOPLASM OF URINARY BLADDER, UNSPECIFIED SITE (HCC): ICD-10-CM

## 2022-06-01 DIAGNOSIS — N18.31 STAGE 3A CHRONIC KIDNEY DISEASE (HCC): ICD-10-CM

## 2022-06-01 DIAGNOSIS — R80.9 MICROALBUMINURIA: ICD-10-CM

## 2022-06-01 DIAGNOSIS — I25.10 CORONARY ARTERY DISEASE INVOLVING NATIVE CORONARY ARTERY OF NATIVE HEART WITHOUT ANGINA PECTORIS: ICD-10-CM

## 2022-06-01 DIAGNOSIS — I10 ESSENTIAL HYPERTENSION: ICD-10-CM

## 2022-06-01 DIAGNOSIS — E11.21 TYPE 2 DIABETES MELLITUS WITH NEPHROPATHY (HCC): ICD-10-CM

## 2022-06-01 DIAGNOSIS — Z00.00 MEDICARE ANNUAL WELLNESS VISIT, SUBSEQUENT: Primary | ICD-10-CM

## 2022-06-01 PROBLEM — N18.30 CHRONIC RENAL DISEASE, STAGE III (HCC): Status: ACTIVE | Noted: 2022-06-01

## 2022-06-01 LAB
ALBUMIN SERPL-MCNC: 3.7 G/DL (ref 3.5–5)
ALBUMIN/GLOB SERPL: 1.2 {RATIO} (ref 1.1–2.2)
ALP SERPL-CCNC: 88 U/L (ref 45–117)
ALT SERPL-CCNC: 25 U/L (ref 12–78)
ANION GAP SERPL CALC-SCNC: 5 MMOL/L (ref 5–15)
AST SERPL-CCNC: 16 U/L (ref 15–37)
BILIRUB SERPL-MCNC: 0.5 MG/DL (ref 0.2–1)
BUN SERPL-MCNC: 22 MG/DL (ref 6–20)
BUN/CREAT SERPL: 17 (ref 12–20)
CALCIUM SERPL-MCNC: 9.6 MG/DL (ref 8.5–10.1)
CHLORIDE SERPL-SCNC: 107 MMOL/L (ref 97–108)
CO2 SERPL-SCNC: 29 MMOL/L (ref 21–32)
CREAT SERPL-MCNC: 1.32 MG/DL (ref 0.7–1.3)
ERYTHROCYTE [DISTWIDTH] IN BLOOD BY AUTOMATED COUNT: 15.9 % (ref 11.5–14.5)
EST. AVERAGE GLUCOSE BLD GHB EST-MCNC: 140 MG/DL
GLOBULIN SER CALC-MCNC: 3.1 G/DL (ref 2–4)
GLUCOSE SERPL-MCNC: 136 MG/DL (ref 65–100)
HBA1C MFR BLD: 6.5 % (ref 4–5.6)
HCT VFR BLD AUTO: 45.7 % (ref 36.6–50.3)
HGB BLD-MCNC: 14.3 G/DL (ref 12.1–17)
MCH RBC QN AUTO: 27 PG (ref 26–34)
MCHC RBC AUTO-ENTMCNC: 31.3 G/DL (ref 30–36.5)
MCV RBC AUTO: 86.2 FL (ref 80–99)
NRBC # BLD: 0 K/UL (ref 0–0.01)
NRBC BLD-RTO: 0 PER 100 WBC
PLATELET # BLD AUTO: 144 K/UL (ref 150–400)
PMV BLD AUTO: 10.4 FL (ref 8.9–12.9)
POTASSIUM SERPL-SCNC: 4.2 MMOL/L (ref 3.5–5.1)
PROT SERPL-MCNC: 6.8 G/DL (ref 6.4–8.2)
RBC # BLD AUTO: 5.3 M/UL (ref 4.1–5.7)
SODIUM SERPL-SCNC: 141 MMOL/L (ref 136–145)
WBC # BLD AUTO: 7.7 K/UL (ref 4.1–11.1)

## 2022-06-01 PROCEDURE — G8752 SYS BP LESS 140: HCPCS | Performed by: FAMILY MEDICINE

## 2022-06-01 PROCEDURE — G8432 DEP SCR NOT DOC, RNG: HCPCS | Performed by: FAMILY MEDICINE

## 2022-06-01 PROCEDURE — G8417 CALC BMI ABV UP PARAM F/U: HCPCS | Performed by: FAMILY MEDICINE

## 2022-06-01 PROCEDURE — 1101F PT FALLS ASSESS-DOCD LE1/YR: CPT | Performed by: FAMILY MEDICINE

## 2022-06-01 PROCEDURE — G8536 NO DOC ELDER MAL SCRN: HCPCS | Performed by: FAMILY MEDICINE

## 2022-06-01 PROCEDURE — G8754 DIAS BP LESS 90: HCPCS | Performed by: FAMILY MEDICINE

## 2022-06-01 PROCEDURE — G8427 DOCREV CUR MEDS BY ELIG CLIN: HCPCS | Performed by: FAMILY MEDICINE

## 2022-06-01 PROCEDURE — 3044F HG A1C LEVEL LT 7.0%: CPT | Performed by: FAMILY MEDICINE

## 2022-06-01 PROCEDURE — G0439 PPPS, SUBSEQ VISIT: HCPCS | Performed by: FAMILY MEDICINE

## 2022-06-01 PROCEDURE — 1123F ACP DISCUSS/DSCN MKR DOCD: CPT | Performed by: FAMILY MEDICINE

## 2022-06-01 RX ORDER — LEVOFLOXACIN 250 MG/1
250 TABLET ORAL DAILY
COMMUNITY
Start: 2022-04-08

## 2022-06-01 NOTE — LETTER
6/1/2022    . [place] [phone]      Dear Yesenia Alvares:    Your kidney function is stable from last check. We will continue to monitor this. Your A1c (average blood sugar) is improved from last check. Good work. Your platelets (blood clot forming cells) are just a little low. We will plan to recheck this with your next labs. The remainder of your labs are stable. Donna Barboza MD      Please find your most recent results below. Resulted Orders   HEMOGLOBIN A1C WITH EAG   Result Value Ref Range    Hemoglobin A1c 6.5 (H) 4.0 - 5.6 %    Est. average glucose 416 mg/dL   METABOLIC PANEL, COMPREHENSIVE   Result Value Ref Range    Sodium 141 136 - 145 mmol/L    Potassium 4.2 3.5 - 5.1 mmol/L    Chloride 107 97 - 108 mmol/L    CO2 29 21 - 32 mmol/L    Anion gap 5 5 - 15 mmol/L    Glucose 136 (H) 65 - 100 mg/dL    BUN 22 (H) 6 - 20 MG/DL    Creatinine 1.32 (H) 0.70 - 1.30 MG/DL    BUN/Creatinine ratio 17 12 - 20      GFR est AA >60 >60 ml/min/1.73m2    GFR est non-AA 52 (L) >60 ml/min/1.73m2    Calcium 9.6 8.5 - 10.1 MG/DL    Bilirubin, total 0.5 0.2 - 1.0 MG/DL    ALT (SGPT) 25 12 - 78 U/L    AST (SGOT) 16 15 - 37 U/L    Alk. phosphatase 88 45 - 117 U/L    Protein, total 6.8 6.4 - 8.2 g/dL    Albumin 3.7 3.5 - 5.0 g/dL    Globulin 3.1 2.0 - 4.0 g/dL    A-G Ratio 1.2 1.1 - 2.2     CBC W/O DIFF   Result Value Ref Range    WBC 7.7 4.1 - 11.1 K/uL    RBC 5.30 4. 10 - 5.70 M/uL    HGB 14.3 12.1 - 17.0 g/dL    HCT 45.7 36.6 - 50.3 %    MCV 86.2 80.0 - 99.0 FL    MCH 27.0 26.0 - 34.0 PG    MCHC 31.3 30.0 - 36.5 g/dL    RDW 15.9 (H) 11.5 - 14.5 %    PLATELET 235 (L) 499 - 400 K/uL    MPV 10.4 8.9 - 12.9 FL    NRBC 0.0 0  WBC    ABSOLUTE NRBC 0.00 0.00 - 0.01 K/uL

## 2022-06-01 NOTE — PROGRESS NOTES
Chief Complaint   Patient presents with    Diabetes     fasting     Hypertension    Cholesterol Problem     1. Have you been to the ER, urgent care clinic since your last visit? Hospitalized since your last visit? No    2. Have you seen or consulted any other health care providers outside of the [address] [place] [address] since your last visit? Include any pap smears or colon screening.  No Pt is 9 weeks pregnant and had covid on 12/31  covid positive       When can she get vaxed for covid at this point?

## 2022-06-01 NOTE — PATIENT INSTRUCTIONS
Medicare Wellness Visit, Male    The best way to live healthy is to have a lifestyle where you eat a well-balanced diet, exercise regularly, limit alcohol use, and quit all forms of tobacco/nicotine, if applicable. Regular preventive services are another way to keep healthy. Preventive services (vaccines, screening tests, monitoring & exams) can help personalize your care plan, which helps you manage your own care. Screening tests can find health problems at the earliest stages, when they are easiest to treat. [facility] follows the current, evidence-based guidelines published by the [facility] ([facility]) when recommending preventive services for our patients. Because we follow these guidelines, sometimes recommendations change over time as research supports it. (For example, a prostate screening blood test is no longer routinely recommended for men with no symptoms). Of course, you and your doctor may decide to screen more often for some diseases, based on your risk and co-morbidities (chronic disease you are already diagnosed with). Preventive services for you include:  - Medicare offers their members a free annual wellness visit, which is time for you and your primary care provider to discuss and plan for your preventive service needs. Take advantage of this benefit every year!  -All adults over age 72 should receive the recommended pneumonia vaccines. Current USPSTF guidelines recommend a series of two vaccines for the best pneumonia protection.   -All adults should have a flu vaccine yearly and tetanus vaccine every 10 years.  -All adults age 48 and older should receive the shingles vaccines (series of two vaccines).        -All adults age 38-68 who are overweight should have a diabetes screening test once every three years.   -Other screening tests & preventive services for persons with diabetes include: an eye exam to screen for diabetic retinopathy, a kidney function test, a foot exam, and stricter control over your cholesterol.   -Cardiovascular screening for adults with routine risk involves an electrocardiogram (ECG) at intervals determined by the provider.   -Colorectal cancer screening should be done for adults age 54-65 with no increased risk factors for colorectal cancer. There are a number of acceptable methods of screening for this type of cancer. Each test has its own benefits and drawbacks. Discuss with your provider what is most appropriate for you during your annual wellness visit. The different tests include: colonoscopy (considered the best screening method), a fecal occult blood test, a fecal DNA test, and sigmoidoscopy.  -All adults born between [facility] should be screened once for Hepatitis C.  -An Abdominal Aortic Aneurysm (AAA) Screening is recommended for men age 73-68 who has ever smoked in their lifetime.      Here is a list of your current Health Maintenance items (your personalized list of preventive services) with a due date:  Health Maintenance Due   Topic Date Due    COVID-19 Vaccine (1) Never done    Diabetic Foot Care  05/22/2021    Albumin Urine Test  05/26/2022

## 2022-06-01 NOTE — PROGRESS NOTES
This is the Subsequent Medicare Annual Wellness Exam, performed 12 months or more after the Initial AWV or the last Subsequent AWV    · Left Leg Swelling: he reports that this has resolved  · DM2: He reports BG as 120s to 160s in am.  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 Aurelio Reddy) Yesterday. · CAD: denies any recent CP; he would like to hold to hold off on seeing heart doctor  · CKD: last GFR was 54 on 11/19/21  · Last saw eye doctor in June. Has apt in Dec with Dr. Cristhian Haley  · GERD: taking omeprazole through Dr. Yelena Elmore  · HTN: not checking BP at home    HM   He states that he does not want COVID vaccine or Flu vaccine at this time. I have reviewed the patient's medical history in detail and updated the computerized patient record. Assessment/Plan   Education and counseling provided:  Are appropriate based on today's review and evaluation      ICD-10-CM ICD-9-CM    1. Medicare annual wellness visit, subsequent  Z00.00 V70.0    2. Type 2 diabetes mellitus with nephropathy (HCC)  E11.21 250.40 CBC W/O DIFF     973.45 METABOLIC PANEL, COMPREHENSIVE      HEMOGLOBIN A1C WITH EAG      HEMOGLOBIN A1C WITH EAG      METABOLIC PANEL, COMPREHENSIVE      CBC W/O DIFF   3. Stage 3a chronic kidney disease (HCC)  N18.31 585.3    4. Malignant neoplasm of urinary bladder, unspecified site (HCC)  C67.9 188.9    5. Essential hypertension  I10 401.9    6. Gastroesophageal reflux disease, unspecified whether esophagitis present  K21.9 530.81    7. Microalbuminuria  R80.9 791.0    8. Coronary artery disease involving native coronary artery of native heart without angina pectoris  I25.10 414.01      · Medicare Wellness Exam: Reviewed and addressed patient's medical history and concerns as discussed in note. Reviewed recommended screenings and immunizations. Discussed recommendations for diet, exercise, and lifestyle.   · All conditions listed above: Chronic, stable and/or managed by specialist. Will continue current treatment regimen. Will check labs as reflected above. Discussed following up with specialist as scheduled. Discussed recommendations for diet and exercise. Depression Risk Factor Screening     3 most recent PHQ Screens 1/28/2022   Little interest or pleasure in doing things Not at all   Feeling down, depressed, irritable, or hopeless Not at all   Total Score PHQ 2 0       Alcohol & Drug Abuse Risk Screen    Do you average more than 1 drink per night or more than 7 drinks a week: No    In the past three months have you have had more than 4 drinks containing alcohol on one occasion: No          Functional Ability and Level of Safety    Hearing: Hearing is good. Activities of Daily Living: The home contains: no safety equipment. Patient does total self care      Ambulation: with no difficulty     Fall Risk:  Fall Risk Assessment, last 12 mths 6/1/2022   Able to walk? Yes   Fall in past 12 months? 0   Do you feel unsteady? 0   Are you worried about falling 0   Number of falls in past 12 months -   Fall with injury?  -      Abuse Screen:  Patient is not abused       Cognitive Screening    Has your family/caregiver stated any concerns about your memory: no     Cognitive Screening: Normal - Clock Drawing Test    Health Maintenance Due     Health Maintenance Due   Topic Date Due    COVID-19 Vaccine (1) Never done    Foot Exam Q1  05/22/2021    MICROALBUMIN Q1  05/26/2022       Patient Care Team   Patient Care Team:  Scout Daniels MD as PCP - General (Family Medicine)  Scout Daniels MD as PCP - [address] [place] [address]  [facility] Provider  Jeff Izquierdo MD (Ophthalmology)  Jewel Zarate MD (Ophthalmology)  Pj Conte DPM (Podiatry)  Tyler Mckeon MD (Orthopedic Surgery)  Joseph Giordano MD (Cardiovascular Disease Physician)  Ambrocio Rasmussen MD (Urology)    History     Patient Active Problem List   Diagnosis Code    Chronic foot pain, right M79.671, G89.29    Essential hypertension I10    Mixed hyperlipidemia E78.2    Type 2 diabetes mellitus with nephropathy (HCC) E11.21    Microalbuminuria R80.9    Elevated serum creatinine R79.89    Chronic ankle pain, bilateral M25.571, G89.29, M25.572    Foot pain, bilateral M79.671, M79.672    Urinary hesitancy R39.11    History of gastritis Z87.19    Psoriasis L40.9    Chronic knee pain M25.569, G89.29    Obesity (BMI 30.0-34. 9) E66.9    Snoring R06.83    Coronary artery disease involving native heart without angina pectoris I25.10    Bladder cancer (HCC) C67.9    Gastroesophageal reflux disease K21.9    Chronic renal disease, stage III N18.30     Past Medical History:   Diagnosis Date    Diabetes ([facility] [address]     type 2; niddm    Hypertension     Ill-defined condition     HIGH CHOLESTEROL      Past Surgical History:   Procedure Laterality Date    HX APPENDECTOMY  1956    HX CATARACT REMOVAL Right     HX COLONOSCOPY  2013    COLONOSCOPY    HX COLONOSCOPY  06/2018    repeat 3 years    HX ENDOSCOPY  02/2018    h pylori moderate chronic gastritis    HX ENDOSCOPY  01/08/2020    Dr. Arlington Eisenmenger HX HEENT Bilateral     STRABISMUS CORRECTION    HX UROLOGICAL      4/6/2022    [place] CARDIAC SURG PROCEDURE UNLIST  03/14/2018    2 stents placed      Current Outpatient Medications   Medication Sig Dispense Refill    lancets misc Use to check blood sugar once daily 100 Each 11    metoprolol succinate (TOPROL-XL) 25 mg XL tablet Take 1 Tablet by mouth daily. 90 Tablet 1    amLODIPine (NORVASC) 10 mg tablet Take 1 Tablet by mouth nightly. 90 Tablet 1    atorvastatin (LIPITOR) 40 mg tablet Take 1 Tablet by mouth daily. 90 Tablet 1    hydroCHLOROthiazide (HYDRODIURIL) 12.5 mg tablet Take 1 Tablet by mouth daily. 90 Tablet 1    losartan (COZAAR) 100 mg tablet Take 1 Tablet by mouth daily.  90 Tablet 1    metFORMIN (GLUCOPHAGE) 500 mg tablet Take 1 Tablet by mouth two (2) times daily (with meals). 180 Tablet 1    SITagliptin (JANUVIA) 50 mg tablet Take 1 Tablet by mouth daily. Lower dose 90 Tablet 1    glucose blood VI test strips (Contour Next Test Strips) strip USE TO TEST ONCE DAILY 100 Strip 3    omeprazole (PRILOSEC) 40 mg capsule Take 40 mg by mouth daily. Take 30 min Before breakfast      aspirin delayed-release 81 mg tablet 1 pill Mon, Wed, Fri      VIT A/VIT C/VIT E/ZINC/COPPER (OCUVITE PRESERVISION PO) Take 1 Tab by mouth daily.  levoFLOXacin (LEVAQUIN) 250 mg tablet Take 250 mg by mouth daily. (Patient not taking: Reported on 2022)      valsartan-hydroCHLOROthiazide (DIOVAN-HCT) 320-25 mg per tablet 1 Tablet.  (Patient not taking: Reported on 2022)       No Known Allergies    Family History   Problem Relation Age of Onset    Diabetes Mother     Hypertension Mother     Other Father          age 55, [de-identified] stroke    Diabetes Sister     Hypertension Sister     Anesth Problems Neg Hx      Social History     Tobacco Use    Smoking status: Former Smoker     Packs/day: 0.75     Years: 25.00     Pack years: 18.75     Quit date: 2004     Years since quittin.4    Smokeless tobacco: Never Used   Substance Use Topics    Alcohol use: No         Davin Patel MD

## 2022-06-02 NOTE — PROGRESS NOTES
Letter Sent:    Dear Larisa Green:    Your kidney function is stable from last check. We will continue to monitor this. Your A1c (average blood sugar) is improved from last check. Good work. Your platelets (blood clot forming cells) are just a little low. We will plan to recheck this with your next labs. The remainder of your labs are stable.      Shea Medellin MD

## 2022-07-07 RX ORDER — BLOOD SUGAR DIAGNOSTIC
STRIP MISCELLANEOUS
Qty: 100 STRIP | Refills: 3 | Status: SHIPPED | OUTPATIENT
Start: 2022-07-07

## 2022-07-07 RX ORDER — INSULIN PUMP SYRINGE, 3 ML
EACH MISCELLANEOUS
Qty: 1 KIT | Refills: 0 | Status: SHIPPED | OUTPATIENT
Start: 2022-07-07

## 2022-07-07 NOTE — TELEPHONE ENCOUNTER
Fax from [employer]. Insurance no longer covers contour next test strips. (sent today 7/7/22)    Asking to change to OneTouch meter and supplies. Entered meter and strips. Thanks, Sergio Gibson    Last Visit: 6/1/22 MD Vanessa Meyer  Next Appointment: 12/2/22 MD Vanessa Meyer  Previous Refill Encounter(s): 7/7/22    Requested Prescriptions     Pending Prescriptions Disp Refills    Blood-Glucose Meter (OneTouch Ultra2 Meter) monitoring kit 1 Kit 0     Sig: Use to test glucose once daily. Dx: E11.21    glucose blood VI test strips (OneTouch Ultra Test) strip 100 Strip 3     Sig: Use to check glucose once daily.   Dx: E11.21     For [facility] in place:    Recommendation Provided To:    Intervention Detail: New Rx: 2, reason: Patient Preference   Gap Closed?:    Intervention Accepted By:   Zannie Cowden Time Spent (min): 5

## 2022-07-07 NOTE — TELEPHONE ENCOUNTER
Last Visit: 6/1/22 MD Ken  Next Appointment: 12/2/22 MD Ken  Previous Refill Encounter(s): 6/21/21 100 + 3    Requested Prescriptions     Pending Prescriptions Disp Refills    glucose blood VI test strips (Contour Next Test Strips) strip 100 Strip 3     Sig: USE TO TEST ONCE DAILY     For Pharmacy Admin Tracking Only     CPA in place:    Recommendation Provided To:    Intervention Detail: New Rx: 1, reason: Patient Preference   Gap Closed?:    Intervention Accepted By:   Blank Feng Time Spent (min): 2

## 2022-08-08 DIAGNOSIS — E11.21 TYPE 2 DIABETES MELLITUS WITH NEPHROPATHY (HCC): ICD-10-CM

## 2022-08-08 NOTE — TELEPHONE ENCOUNTER
Chief Complaint   Patient presents with    Medication Refill     Junuvia 50mg    Patient seen on 06/01/22.

## 2022-09-28 DIAGNOSIS — E11.21 TYPE 2 DIABETES MELLITUS WITH NEPHROPATHY (HCC): ICD-10-CM

## 2022-09-28 RX ORDER — METFORMIN HYDROCHLORIDE 500 MG/1
500 TABLET ORAL 2 TIMES DAILY WITH MEALS
Qty: 180 TABLET | Refills: 0 | Status: SHIPPED | OUTPATIENT
Start: 2022-09-28

## 2022-09-28 RX ORDER — AMLODIPINE BESYLATE 10 MG/1
10 TABLET ORAL
Qty: 90 TABLET | Refills: 0 | Status: SHIPPED | OUTPATIENT
Start: 2022-09-28

## 2022-09-28 RX ORDER — METOPROLOL SUCCINATE 25 MG/1
25 TABLET, EXTENDED RELEASE ORAL DAILY
Qty: 90 TABLET | Refills: 0 | Status: SHIPPED | OUTPATIENT
Start: 2022-09-28

## 2022-09-28 RX ORDER — LOSARTAN POTASSIUM 100 MG/1
100 TABLET ORAL DAILY
Qty: 90 TABLET | Refills: 0 | Status: SHIPPED | OUTPATIENT
Start: 2022-09-28

## 2022-09-28 RX ORDER — HYDROCHLOROTHIAZIDE 12.5 MG/1
12.5 TABLET ORAL DAILY
Qty: 90 TABLET | Refills: 0 | Status: SHIPPED | OUTPATIENT
Start: 2022-09-28

## 2022-09-28 RX ORDER — ATORVASTATIN CALCIUM 40 MG/1
40 TABLET, FILM COATED ORAL DAILY
Qty: 90 TABLET | Refills: 0 | Status: SHIPPED | OUTPATIENT
Start: 2022-09-28

## 2022-09-28 NOTE — TELEPHONE ENCOUNTER
Last visit 06/01/2022 MD Awilda Carrasco  Next appointment 12/02/2022 MD Awilda Carrasco   Previous refill encounter(s)   04/04/2022:  - Glucophage #180 with 1 refill,   - Cozaar #90 with 1 refill,   - HCTZ #90 with 1 refill,   - Lipitor #90 with 1 refill,   - Norvasc #90 with 1 refill,   04/11/2022 Toprol-XL #90 with 1 refill. For Pharmacy Admin Tracking Only  Intervention Detail: New Rx: 6, reason: Patient Preference  Time Spent (min): 10      Requested Prescriptions     Pending Prescriptions Disp Refills    losartan (COZAAR) 100 mg tablet 90 Tablet 0     Sig: Take 1 Tablet by mouth daily. atorvastatin (LIPITOR) 40 mg tablet 90 Tablet 0     Sig: Take 1 Tablet by mouth daily. amLODIPine (NORVASC) 10 mg tablet 90 Tablet 0     Sig: Take 1 Tablet by mouth nightly. hydroCHLOROthiazide (HYDRODIURIL) 12.5 mg tablet 90 Tablet 0     Sig: Take 1 Tablet by mouth daily. metoprolol succinate (TOPROL-XL) 25 mg XL tablet 90 Tablet 0     Sig: Take 1 Tablet by mouth daily. metFORMIN (GLUCOPHAGE) 500 mg tablet 180 Tablet 0     Sig: Take 1 Tablet by mouth two (2) times daily (with meals).

## 2022-12-02 ENCOUNTER — OFFICE VISIT (OUTPATIENT)
Dept: FAMILY MEDICINE CLINIC | Age: 82
End: 2022-12-02
Payer: MEDICARE

## 2022-12-02 VITALS
SYSTOLIC BLOOD PRESSURE: 144 MMHG | RESPIRATION RATE: 14 BRPM | OXYGEN SATURATION: 97 % | TEMPERATURE: 98.4 F | HEIGHT: 64 IN | BODY MASS INDEX: 31.24 KG/M2 | DIASTOLIC BLOOD PRESSURE: 80 MMHG | WEIGHT: 183 LBS | HEART RATE: 55 BPM

## 2022-12-02 DIAGNOSIS — C67.9 MALIGNANT NEOPLASM OF URINARY BLADDER, UNSPECIFIED SITE (HCC): ICD-10-CM

## 2022-12-02 DIAGNOSIS — E11.21 TYPE 2 DIABETES MELLITUS WITH NEPHROPATHY (HCC): ICD-10-CM

## 2022-12-02 DIAGNOSIS — R80.9 MICROALBUMINURIA: ICD-10-CM

## 2022-12-02 DIAGNOSIS — K21.9 GASTROESOPHAGEAL REFLUX DISEASE, UNSPECIFIED WHETHER ESOPHAGITIS PRESENT: ICD-10-CM

## 2022-12-02 DIAGNOSIS — N52.9 ERECTILE DYSFUNCTION, UNSPECIFIED ERECTILE DYSFUNCTION TYPE: Primary | ICD-10-CM

## 2022-12-02 DIAGNOSIS — I25.10 CORONARY ARTERY DISEASE INVOLVING NATIVE CORONARY ARTERY OF NATIVE HEART WITHOUT ANGINA PECTORIS: ICD-10-CM

## 2022-12-02 DIAGNOSIS — I10 ESSENTIAL HYPERTENSION: ICD-10-CM

## 2022-12-02 PROCEDURE — G8536 NO DOC ELDER MAL SCRN: HCPCS | Performed by: FAMILY MEDICINE

## 2022-12-02 PROCEDURE — G8417 CALC BMI ABV UP PARAM F/U: HCPCS | Performed by: FAMILY MEDICINE

## 2022-12-02 PROCEDURE — 3044F HG A1C LEVEL LT 7.0%: CPT | Performed by: FAMILY MEDICINE

## 2022-12-02 PROCEDURE — G8427 DOCREV CUR MEDS BY ELIG CLIN: HCPCS | Performed by: FAMILY MEDICINE

## 2022-12-02 PROCEDURE — 3078F DIAST BP <80 MM HG: CPT | Performed by: FAMILY MEDICINE

## 2022-12-02 PROCEDURE — G8754 DIAS BP LESS 90: HCPCS | Performed by: FAMILY MEDICINE

## 2022-12-02 PROCEDURE — 1101F PT FALLS ASSESS-DOCD LE1/YR: CPT | Performed by: FAMILY MEDICINE

## 2022-12-02 PROCEDURE — G8753 SYS BP > OR = 140: HCPCS | Performed by: FAMILY MEDICINE

## 2022-12-02 PROCEDURE — 99214 OFFICE O/P EST MOD 30 MIN: CPT | Performed by: FAMILY MEDICINE

## 2022-12-02 PROCEDURE — 3074F SYST BP LT 130 MM HG: CPT | Performed by: FAMILY MEDICINE

## 2022-12-02 PROCEDURE — G8510 SCR DEP NEG, NO PLAN REQD: HCPCS | Performed by: FAMILY MEDICINE

## 2022-12-02 PROCEDURE — 1123F ACP DISCUSS/DSCN MKR DOCD: CPT | Performed by: FAMILY MEDICINE

## 2022-12-02 RX ORDER — TADALAFIL 5 MG/1
TABLET ORAL
Qty: 30 TABLET | Refills: 2 | Status: SHIPPED | OUTPATIENT
Start: 2022-12-02 | End: 2022-12-02 | Stop reason: SDUPTHER

## 2022-12-02 RX ORDER — TADALAFIL 5 MG/1
TABLET ORAL
Qty: 30 TABLET | Refills: 2 | Status: SHIPPED | OUTPATIENT
Start: 2022-12-02

## 2022-12-02 NOTE — PROGRESS NOTES
Chan Portillo  81 y.o. male  1940  [address] [place] [id number]  [id number]     [facility]       Encounter Date: 12/2/2022           Established Patient Visit Note: Carrol Guidry MD    Reason for Appointment:  Chief Complaint   Patient presents with    Follow-up         History of Present Illness:  History provided by patient    Patient declines interpretor. He prefers for his wife to interpret. Chan Portillo is a 81 y.o. male who presents to clinic today for:       DM2: He reports BG as 130-140s in am.  last A1c was 6.5 on 6/1/22  Microalbutrinuria: last M:C was 256 on 5/26/21  Bladder Cancer: followed by urology (Dr. Kane Del Real) with last visit in June. He has follow up visit in Feb.  He reports that urology is monitoring his PSA. CAD: denies any recent CP; he would like to hold to hold off on seeing heart doctor He continues Atorvastatin, metoprolol, and aspirin. CKD: last GFR was 52   GERD: previously followed by GI (Dr. Bairon Chávez); he states that he is no longer taking omeprazole d/t symptoms improving. He denies any recent reflux. HTN: not checking BP at home  ED: he reports that he has noticied some difficulty with both getting and keeping an erection       Eye Exam: he has apt next week with  Dr. Yesenia Scott      HM   He states that he does not want COVID vaccine or Flu vaccine at this time. Review of Systems  All other ROS were reviewed and are negative except as discussed in HPI        Allergies: Patient has no known allergies. Medications:     Current Outpatient Medications:     tadalafiL (Cialis) 5 mg tablet, Take 1 to 2 tabs at least 30 minutes prior to anticipated sexual activity as one single dose and not more than once daily. , Disp: 30 Tablet, Rfl: 2    losartan (COZAAR) 100 mg tablet, Take 1 Tablet by mouth daily. , Disp: 90 Tablet, Rfl: 0    atorvastatin (LIPITOR) 40 mg tablet, Take 1 Tablet by mouth daily. , Disp: 90 Tablet, Rfl: 0    amLODIPine (NORVASC) 10 mg tablet, Take 1 Tablet by mouth nightly., Disp: 90 Tablet, Rfl: 0    hydroCHLOROthiazide (HYDRODIURIL) 12.5 mg tablet, Take 1 Tablet by mouth daily. , Disp: 90 Tablet, Rfl: 0    metoprolol succinate (TOPROL-XL) 25 mg XL tablet, Take 1 Tablet by mouth daily. , Disp: 90 Tablet, Rfl: 0    metFORMIN (GLUCOPHAGE) 500 mg tablet, Take 1 Tablet by mouth two (2) times daily (with meals). , Disp: 180 Tablet, Rfl: 0    SITagliptin (JANUVIA) 50 mg tablet, Take 1 Tablet by mouth in the morning. Lower dose, Disp: 90 Tablet, Rfl: 1    glucose blood VI test strips (Contour Next Test Strips) strip, USE TO TEST ONCE DAILY, Disp: 100 Strip, Rfl: 3    Blood-Glucose Meter (OneTouch Ultra2 Meter) monitoring kit, Use to test glucose once daily. Dx: E11.21, Disp: 1 Kit, Rfl: 0    glucose blood VI test strips (OneTouch Ultra Test) strip, Use to check glucose once daily. Dx: E11.21, Disp: 100 Strip, Rfl: 3    lancets misc, Use to check blood sugar once daily, Disp: 100 Each, Rfl: 11    aspirin delayed-release 81 mg tablet, 1 pill Mon, Wed, Fri, Disp: , Rfl:     VIT A/VIT C/VIT E/ZINC/COPPER (OCUVITE PRESERVISION PO), Take 1 Tab by mouth daily. , Disp: , Rfl:     History  Patient Care Team:  Verenice Ballesteros MD as PCP - General (Family Medicine)  Verenice Ballesteros MD as PCP - [facility]  Joan Conteh MD (Ophthalmology)  Jessica Marcso MD (Ophthalmology)  Ashley Huizar DPM (Podiatry)  Casey Coulter MD (Orthopedic Surgery)  Jaiden Castro MD (Cardiovascular Disease Physician)  Rachelle Del Cid MD (Urology)    Past Medical History: he has a past medical history of Diabetes [facility]), Hypertension, and Ill-defined condition.     Past Surgical History: he has a past surgical history that includes hx cataract removal (Right); hx heent (Bilateral); hx appendectomy (1956); hx endoscopy (02/2018); pr cardiac surg procedure unlist (03/14/2018); hx colonoscopy (2013); hx colonoscopy (06/2018); hx endoscopy (01/08/2020); and hx urological.    Family Medical History: family history includes Diabetes in his mother and sister; Hypertension in his mother and sister; Other in his father. Social History: he reports that he quit smoking about 18 years ago. He has a 18.75 pack-year smoking history. He has never used smokeless tobacco. He reports that he does not drink alcohol and does not use drugs. Objective:   Visit Vitals  BP (!) 144/80 (BP 1 Location: Right arm, BP Patient Position: Sitting, BP Cuff Size: Adult long)   Pulse (!) 55   Temp 98.4 °F (36.9 °C) (Temporal)   Resp 14   Ht 5' 4\" (1.626 m)   Wt 183 lb (83 kg)   SpO2 97%   BMI 31.41 kg/m²     Wt Readings from Last 3 Encounters:   12/02/22 183 lb (83 kg)   06/01/22 184 lb (83.5 kg)   01/28/22 193 lb 9.6 oz (87.8 kg)       Physical Exam  Constitutional:       Appearance: Normal appearance. HENT:      Head: Normocephalic and atraumatic. Right Ear: External ear normal.      Left Ear: External ear normal.      Nose: Nose normal.      Mouth/Throat:      Pharynx: No oropharyngeal exudate. Eyes:      General: No scleral icterus. Right eye: No discharge. Left eye: No discharge. Conjunctiva/sclera: Conjunctivae normal.      Pupils: Pupils are equal, round, and reactive to light. Neck:      Thyroid: No thyromegaly. Vascular: No JVD. Trachea: No tracheal deviation. Cardiovascular:      Rate and Rhythm: Normal rate and regular rhythm. Heart sounds: Normal heart sounds. No murmur heard. No friction rub. No gallop. Pulmonary:      Effort: Pulmonary effort is normal. No respiratory distress. Breath sounds: Normal breath sounds. No stridor. No wheezing. Musculoskeletal:         General: No tenderness or deformity. Normal range of motion. Cervical back: Normal range of motion and neck supple. Lymphadenopathy:      Cervical: No cervical adenopathy.    Skin:     General: Skin is warm and dry.   Neurological:      Mental Status: He is alert. Cranial Nerves: No cranial nerve deficit. Coordination: Coordination normal.      Gait: Gait is intact. Deep Tendon Reflexes: Reflexes normal.       Assessment & Plan:      ICD-10-CM ICD-9-CM    1. Erectile dysfunction, unspecified erectile dysfunction type  N52.9 607.84 tadalafiL (Cialis) 5 mg tablet      DISCONTINUED: tadalafiL (Cialis) 5 mg tablet      DISCONTINUED: tadalafiL (Cialis) 5 mg tablet      2. Type 2 diabetes mellitus with nephropathy ([facility])  E11.21 250.40 HEMOGLOBIN A1C WITH EAG     583.81 HEMOGLOBIN A1C WITH EAG      3. Essential hypertension  I10 401.9 CBC W/O DIFF      METABOLIC PANEL, COMPREHENSIVE      URINALYSIS W/ RFLX MICROSCOPIC      TSH 3RD GENERATION      TSH 3RD GENERATION      URINALYSIS W/ RFLX MICROSCOPIC      METABOLIC PANEL, COMPREHENSIVE      CBC W/O DIFF      4. Coronary artery disease involving native coronary artery of native heart without angina pectoris  I25.10 414.01 LIPID PANEL      LIPID PANEL      5. Malignant neoplasm of urinary bladder, unspecified site ([facility])  C67.9 188.9       6. Gastroesophageal reflux disease, unspecified whether esophagitis present  K21.9 530.81       7. Microalbuminuria  R80.9 791.0           ED: Chronic, uncontrolled. discussed medication options and associated risks/benefits/side effects/precautions; patient would like to try  Cialis. Will start. Discussed precautions related to this and patient expresses understanding. All other conditions listed above: Chronic, stable and/or managed by specialist. Will continue current treatment regimen. Will check labs as reflected above. Discussed following up with specialist as scheduled. Discussed recommendations for diet and exercise. I have discussed the diagnosis with the patient and the intended plan as seen in the above orders. The patient has received an after-visit summary along with patient information handout.   I have discussed medication side effects and warnings with the patient as well.     Disposition  Follow-up and Dispositions    Return in about 6 months (around 6/2/2023) for medicare wellness exam.           Lilibeth Ceja MD

## 2022-12-02 NOTE — PROGRESS NOTES
Chief Complaint   Patient presents with    Follow-up     1. Have you been to the ER, urgent care clinic since your last visit? Hospitalized since your last visit? No    2. Have you seen or consulted any other health care providers outside of the [address] [place] [address] since your last visit? Include any pap smears or colon screening.  No

## 2022-12-03 LAB
ALBUMIN SERPL-MCNC: 3.8 G/DL (ref 3.5–5)
ALBUMIN/GLOB SERPL: 1.2 {RATIO} (ref 1.1–2.2)
ALP SERPL-CCNC: 83 U/L (ref 45–117)
ALT SERPL-CCNC: 33 U/L (ref 12–78)
ANION GAP SERPL CALC-SCNC: 5 MMOL/L (ref 5–15)
APPEARANCE UR: ABNORMAL
AST SERPL-CCNC: 17 U/L (ref 15–37)
BACTERIA URNS QL MICRO: NEGATIVE /HPF
BILIRUB SERPL-MCNC: 0.7 MG/DL (ref 0.2–1)
BILIRUB UR QL: NEGATIVE
BUN SERPL-MCNC: 19 MG/DL (ref 6–20)
BUN/CREAT SERPL: 14 (ref 12–20)
CALCIUM SERPL-MCNC: 9.8 MG/DL (ref 8.5–10.1)
CHLORIDE SERPL-SCNC: 104 MMOL/L (ref 97–108)
CHOLEST SERPL-MCNC: 147 MG/DL
CO2 SERPL-SCNC: 29 MMOL/L (ref 21–32)
COLOR UR: ABNORMAL
CREAT SERPL-MCNC: 1.38 MG/DL (ref 0.7–1.3)
EPITH CASTS URNS QL MICRO: ABNORMAL /LPF
ERYTHROCYTE [DISTWIDTH] IN BLOOD BY AUTOMATED COUNT: 13.9 % (ref 11.5–14.5)
EST. AVERAGE GLUCOSE BLD GHB EST-MCNC: 131 MG/DL
GLOBULIN SER CALC-MCNC: 3.2 G/DL (ref 2–4)
GLUCOSE SERPL-MCNC: 134 MG/DL (ref 65–100)
GLUCOSE UR STRIP.AUTO-MCNC: NEGATIVE MG/DL
HBA1C MFR BLD: 6.2 % (ref 4–5.6)
HCT VFR BLD AUTO: 47.3 % (ref 36.6–50.3)
HDLC SERPL-MCNC: 56 MG/DL
HDLC SERPL: 2.6 {RATIO} (ref 0–5)
HGB BLD-MCNC: 15.5 G/DL (ref 12.1–17)
HGB UR QL STRIP: NEGATIVE
HYALINE CASTS URNS QL MICRO: ABNORMAL /LPF (ref 0–5)
KETONES UR QL STRIP.AUTO: NEGATIVE MG/DL
LDLC SERPL CALC-MCNC: 60.6 MG/DL (ref 0–100)
LEUKOCYTE ESTERASE UR QL STRIP.AUTO: NEGATIVE
MCH RBC QN AUTO: 27.6 PG (ref 26–34)
MCHC RBC AUTO-ENTMCNC: 32.8 G/DL (ref 30–36.5)
MCV RBC AUTO: 84.2 FL (ref 80–99)
NITRITE UR QL STRIP.AUTO: NEGATIVE
NRBC # BLD: 0 K/UL (ref 0–0.01)
NRBC BLD-RTO: 0 PER 100 WBC
PH UR STRIP: 6 [PH] (ref 5–8)
PLATELET # BLD AUTO: 158 K/UL (ref 150–400)
PMV BLD AUTO: 10.2 FL (ref 8.9–12.9)
POTASSIUM SERPL-SCNC: 4.4 MMOL/L (ref 3.5–5.1)
PROT SERPL-MCNC: 7 G/DL (ref 6.4–8.2)
PROT UR STRIP-MCNC: 100 MG/DL
RBC # BLD AUTO: 5.62 M/UL (ref 4.1–5.7)
RBC #/AREA URNS HPF: ABNORMAL /HPF (ref 0–5)
SODIUM SERPL-SCNC: 138 MMOL/L (ref 136–145)
SP GR UR REFRACTOMETRY: 1.02 (ref 1–1.03)
TRIGL SERPL-MCNC: 152 MG/DL (ref ?–150)
TSH SERPL DL<=0.05 MIU/L-ACNC: 1.15 UIU/ML (ref 0.36–3.74)
UROBILINOGEN UR QL STRIP.AUTO: 0.2 EU/DL (ref 0.2–1)
VLDLC SERPL CALC-MCNC: 30.4 MG/DL
WBC # BLD AUTO: 8 K/UL (ref 4.1–11.1)
WBC URNS QL MICRO: ABNORMAL /HPF (ref 0–4)

## 2022-12-08 DIAGNOSIS — R80.9 PROTEINURIA, UNSPECIFIED TYPE: Primary | ICD-10-CM

## 2022-12-08 NOTE — PROGRESS NOTES
Please let patient know that there is protein in his urine. I would like to obtain an ultrasound to evaluate this further. Please provide patient with phone number to central scheduling ([phone]). His A1c (average blood sugar) is improved from last check (6.5 to 6.2). The remainder of his labs are stable.

## 2022-12-08 NOTE — PROGRESS NOTES
Called Patient. Name and  confirmed. Informed him about  there is protein in your urine. Dr. Gume Bergman would like to obtain an ultrasound to evaluate this further. A1c (average blood sugar) is improved from last check (6.5 to 6.2). The remainder of his labs are stable. Central scheduling phone number has been provided. Patient stated understanding.

## 2022-12-19 ENCOUNTER — HOSPITAL ENCOUNTER (OUTPATIENT)
Dept: ULTRASOUND IMAGING | Age: 82
Discharge: HOME | End: 2022-12-19
Attending: FAMILY MEDICINE
Payer: MEDICARE

## 2022-12-19 DIAGNOSIS — R80.9 PROTEINURIA, UNSPECIFIED TYPE: ICD-10-CM

## 2022-12-19 PROCEDURE — 76770 US EXAM ABDO BACK WALL COMP: CPT

## 2022-12-20 NOTE — PROGRESS NOTES
Called Patient. Spoke with patient's wife Corrinne Reel as per PHI. Name and  confirmed. Informed her about  his ultrasound did not show any masses. It did show findings consistent with chronic kidney disease. We will continue to monitor this. She stated understanding.

## 2022-12-20 NOTE — PROGRESS NOTES
Please let patient know that his ultrasound did not show any masses. It did show findings consistent with chronic kidney disease. We will continue to monitor this.

## 2023-01-05 RX ORDER — AMLODIPINE BESYLATE 10 MG/1
10 TABLET ORAL
Qty: 90 TABLET | Refills: 1 | Status: SHIPPED | OUTPATIENT
Start: 2023-01-05

## 2023-01-05 RX ORDER — LOSARTAN POTASSIUM 100 MG/1
100 TABLET ORAL DAILY
Qty: 90 TABLET | Refills: 1 | Status: SHIPPED | OUTPATIENT
Start: 2023-01-05

## 2023-01-05 RX ORDER — METOPROLOL SUCCINATE 25 MG/1
25 TABLET, EXTENDED RELEASE ORAL DAILY
Qty: 90 TABLET | Refills: 1 | Status: SHIPPED | OUTPATIENT
Start: 2023-01-05

## 2023-01-05 RX ORDER — HYDROCHLOROTHIAZIDE 12.5 MG/1
12.5 TABLET ORAL DAILY
Qty: 90 TABLET | Refills: 1 | Status: SHIPPED | OUTPATIENT
Start: 2023-01-05

## 2023-01-05 RX ORDER — ATORVASTATIN CALCIUM 40 MG/1
40 TABLET, FILM COATED ORAL DAILY
Qty: 90 TABLET | Refills: 1 | Status: SHIPPED | OUTPATIENT
Start: 2023-01-05

## 2023-01-05 NOTE — TELEPHONE ENCOUNTER
Last Visit: 12/2/22 with MD Christianna Osler  Next Appointment: 6/5/23 with MD Christianna Osler  Previous Refill Encounter(s): 9/28/22 90 d/s    Requested Prescriptions     Pending Prescriptions Disp Refills    hydroCHLOROthiazide (HYDRODIURIL) 12.5 mg tablet 90 Tablet 1     Sig: Take 1 Tablet by mouth daily. atorvastatin (LIPITOR) 40 mg tablet 90 Tablet 1     Sig: Take 1 Tablet by mouth daily. amLODIPine (NORVASC) 10 mg tablet 90 Tablet 1     Sig: Take 1 Tablet by mouth nightly. losartan (COZAAR) 100 mg tablet 90 Tablet 1     Sig: Take 1 Tablet by mouth daily. metoprolol succinate (TOPROL-XL) 25 mg XL tablet 90 Tablet 1     Sig: Take 1 Tablet by mouth daily. For Pharmacy Admin Tracking Only    Program: Medication Refill  CPA in place:   Recommendation Provided To:    Intervention Detail: New Rx: 5, reason: Patient Preference  Intervention Accepted By:   Harris Pink Closed?:   Time Spent (min): 5

## 2023-02-03 DIAGNOSIS — E11.21 TYPE 2 DIABETES MELLITUS WITH NEPHROPATHY (HCC): ICD-10-CM

## 2023-02-03 RX ORDER — METFORMIN HYDROCHLORIDE 500 MG/1
500 TABLET ORAL 2 TIMES DAILY WITH MEALS
Qty: 180 TABLET | Refills: 1 | Status: SHIPPED | OUTPATIENT
Start: 2023-02-03

## 2023-02-03 NOTE — TELEPHONE ENCOUNTER
Last Visit: 12/2/22 MD Meera Medina  Next Appointment: 6/5/23 MD Meera Medina  Previous Refill Encounter(s):   Metformin 9/28/22 180  Januvia 8/8/22 90 + 1    Requested Prescriptions     Pending Prescriptions Disp Refills    metFORMIN (GLUCOPHAGE) 500 mg tablet 180 Tablet 1     Sig: Take 1 Tablet by mouth two (2) times daily (with meals). SITagliptin phosphate (JANUVIA) 50 mg tablet 90 Tablet 1     Sig: Take 1 Tablet by mouth daily. Lower dose     For Pharmacy Admin Tracking Only    Program: Medication Refill  CPA in place:   Recommendation Provided To:    Intervention Detail: New Rx: 2, reason: Patient Preference  Intervention Accepted By:   Violeta Dunlap Closed?:   Time Spent (min): 5

## 2023-05-24 RX ORDER — AMLODIPINE BESYLATE 10 MG/1
1 TABLET ORAL NIGHTLY
COMMUNITY
Start: 2023-01-05 | End: 2023-06-06 | Stop reason: SDUPTHER

## 2023-05-24 RX ORDER — METOPROLOL SUCCINATE 25 MG/1
25 TABLET, EXTENDED RELEASE ORAL DAILY
COMMUNITY
Start: 2023-01-05 | End: 2023-06-06 | Stop reason: SDUPTHER

## 2023-05-24 RX ORDER — ATORVASTATIN CALCIUM 40 MG/1
40 TABLET, FILM COATED ORAL DAILY
COMMUNITY
Start: 2023-01-05 | End: 2023-06-06 | Stop reason: SDUPTHER

## 2023-05-24 RX ORDER — TADALAFIL 5 MG/1
TABLET ORAL
COMMUNITY
Start: 2022-12-02

## 2023-05-24 RX ORDER — HYDROCHLOROTHIAZIDE 12.5 MG/1
12.5 TABLET ORAL DAILY
COMMUNITY
Start: 2023-01-05 | End: 2023-06-06 | Stop reason: SDUPTHER

## 2023-05-24 RX ORDER — ASPIRIN 81 MG/1
TABLET ORAL
COMMUNITY
Start: 2019-02-15 | End: 2023-06-06 | Stop reason: SDUPTHER

## 2023-05-24 RX ORDER — LANCETS 30 GAUGE
EACH MISCELLANEOUS
COMMUNITY
Start: 2022-05-10

## 2023-05-24 RX ORDER — LOSARTAN POTASSIUM 100 MG/1
100 TABLET ORAL DAILY
COMMUNITY
Start: 2023-01-05 | End: 2023-06-06 | Stop reason: SDUPTHER

## 2023-06-06 ENCOUNTER — OFFICE VISIT (OUTPATIENT)
Age: 83
End: 2023-06-06
Payer: MEDICARE

## 2023-06-06 VITALS
HEIGHT: 64 IN | DIASTOLIC BLOOD PRESSURE: 82 MMHG | SYSTOLIC BLOOD PRESSURE: 160 MMHG | WEIGHT: 181.8 LBS | OXYGEN SATURATION: 98 % | TEMPERATURE: 97.2 F | RESPIRATION RATE: 18 BRPM | HEART RATE: 54 BPM | BODY MASS INDEX: 31.04 KG/M2

## 2023-06-06 DIAGNOSIS — I25.10 CORONARY ARTERY DISEASE INVOLVING NATIVE HEART WITHOUT ANGINA PECTORIS, UNSPECIFIED VESSEL OR LESION TYPE: ICD-10-CM

## 2023-06-06 DIAGNOSIS — Z00.00 MEDICARE ANNUAL WELLNESS VISIT, SUBSEQUENT: Primary | ICD-10-CM

## 2023-06-06 DIAGNOSIS — I10 ESSENTIAL HYPERTENSION: ICD-10-CM

## 2023-06-06 DIAGNOSIS — N18.31 CHRONIC KIDNEY DISEASE, STAGE 3A (HCC): ICD-10-CM

## 2023-06-06 DIAGNOSIS — E11.21 TYPE 2 DIABETES MELLITUS WITH NEPHROPATHY (HCC): ICD-10-CM

## 2023-06-06 DIAGNOSIS — C67.9 MALIGNANT NEOPLASM OF URINARY BLADDER, UNSPECIFIED SITE (HCC): ICD-10-CM

## 2023-06-06 LAB
ALBUMIN SERPL-MCNC: 3.9 G/DL (ref 3.5–5)
ALBUMIN/GLOB SERPL: 1.2 (ref 1.1–2.2)
ALP SERPL-CCNC: 85 U/L (ref 45–117)
ALT SERPL-CCNC: 27 U/L (ref 12–78)
ANION GAP SERPL CALC-SCNC: 3 MMOL/L (ref 5–15)
AST SERPL-CCNC: 18 U/L (ref 15–37)
BILIRUB SERPL-MCNC: 0.6 MG/DL (ref 0.2–1)
BUN SERPL-MCNC: 29 MG/DL (ref 6–20)
BUN/CREAT SERPL: 21 (ref 12–20)
CALCIUM SERPL-MCNC: 10.2 MG/DL (ref 8.5–10.1)
CHLORIDE SERPL-SCNC: 106 MMOL/L (ref 97–108)
CO2 SERPL-SCNC: 29 MMOL/L (ref 21–32)
CREAT SERPL-MCNC: 1.38 MG/DL (ref 0.7–1.3)
EST. AVERAGE GLUCOSE BLD GHB EST-MCNC: 128 MG/DL
GLOBULIN SER CALC-MCNC: 3.2 G/DL (ref 2–4)
GLUCOSE SERPL-MCNC: 124 MG/DL (ref 65–100)
HBA1C MFR BLD: 6.1 % (ref 4–5.6)
POTASSIUM SERPL-SCNC: 4.5 MMOL/L (ref 3.5–5.1)
PROT SERPL-MCNC: 7.1 G/DL (ref 6.4–8.2)
SODIUM SERPL-SCNC: 138 MMOL/L (ref 136–145)

## 2023-06-06 PROCEDURE — 3077F SYST BP >= 140 MM HG: CPT | Performed by: STUDENT IN AN ORGANIZED HEALTH CARE EDUCATION/TRAINING PROGRAM

## 2023-06-06 PROCEDURE — 1036F TOBACCO NON-USER: CPT | Performed by: STUDENT IN AN ORGANIZED HEALTH CARE EDUCATION/TRAINING PROGRAM

## 2023-06-06 PROCEDURE — 99214 OFFICE O/P EST MOD 30 MIN: CPT | Performed by: STUDENT IN AN ORGANIZED HEALTH CARE EDUCATION/TRAINING PROGRAM

## 2023-06-06 PROCEDURE — 3079F DIAST BP 80-89 MM HG: CPT | Performed by: STUDENT IN AN ORGANIZED HEALTH CARE EDUCATION/TRAINING PROGRAM

## 2023-06-06 PROCEDURE — G8427 DOCREV CUR MEDS BY ELIG CLIN: HCPCS | Performed by: STUDENT IN AN ORGANIZED HEALTH CARE EDUCATION/TRAINING PROGRAM

## 2023-06-06 PROCEDURE — G0439 PPPS, SUBSEQ VISIT: HCPCS | Performed by: STUDENT IN AN ORGANIZED HEALTH CARE EDUCATION/TRAINING PROGRAM

## 2023-06-06 PROCEDURE — 1123F ACP DISCUSS/DSCN MKR DOCD: CPT | Performed by: STUDENT IN AN ORGANIZED HEALTH CARE EDUCATION/TRAINING PROGRAM

## 2023-06-06 PROCEDURE — G8417 CALC BMI ABV UP PARAM F/U: HCPCS | Performed by: STUDENT IN AN ORGANIZED HEALTH CARE EDUCATION/TRAINING PROGRAM

## 2023-06-06 RX ORDER — HYDROCHLOROTHIAZIDE 12.5 MG/1
12.5 TABLET ORAL 2 TIMES DAILY
Qty: 180 TABLET | Refills: 0 | Status: SHIPPED | OUTPATIENT
Start: 2023-06-06

## 2023-06-06 RX ORDER — AMLODIPINE BESYLATE 10 MG/1
10 TABLET ORAL NIGHTLY
Qty: 90 TABLET | Refills: 1 | Status: SHIPPED | OUTPATIENT
Start: 2023-06-06

## 2023-06-06 RX ORDER — METOPROLOL SUCCINATE 25 MG/1
25 TABLET, EXTENDED RELEASE ORAL DAILY
Qty: 90 TABLET | Refills: 1 | Status: SHIPPED | OUTPATIENT
Start: 2023-06-06

## 2023-06-06 RX ORDER — ATORVASTATIN CALCIUM 40 MG/1
40 TABLET, FILM COATED ORAL DAILY
Qty: 90 TABLET | Refills: 1 | Status: SHIPPED | OUTPATIENT
Start: 2023-06-06

## 2023-06-06 RX ORDER — LOSARTAN POTASSIUM 100 MG/1
100 TABLET ORAL DAILY
Qty: 90 TABLET | Refills: 1 | Status: SHIPPED | OUTPATIENT
Start: 2023-06-06

## 2023-06-06 RX ORDER — ASPIRIN 81 MG/1
TABLET ORAL
Qty: 90 TABLET | Refills: 0 | Status: SHIPPED | OUTPATIENT
Start: 2023-06-06

## 2023-06-06 SDOH — ECONOMIC STABILITY: FOOD INSECURITY: WITHIN THE PAST 12 MONTHS, YOU WORRIED THAT YOUR FOOD WOULD RUN OUT BEFORE YOU GOT MONEY TO BUY MORE.: NEVER TRUE

## 2023-06-06 SDOH — ECONOMIC STABILITY: INCOME INSECURITY: HOW HARD IS IT FOR YOU TO PAY FOR THE VERY BASICS LIKE FOOD, HOUSING, MEDICAL CARE, AND HEATING?: NOT HARD AT ALL

## 2023-06-06 SDOH — ECONOMIC STABILITY: HOUSING INSECURITY
IN THE LAST 12 MONTHS, WAS THERE A TIME WHEN YOU DID NOT HAVE A STEADY PLACE TO SLEEP OR SLEPT IN A SHELTER (INCLUDING NOW)?: NO

## 2023-06-06 SDOH — ECONOMIC STABILITY: FOOD INSECURITY: WITHIN THE PAST 12 MONTHS, THE FOOD YOU BOUGHT JUST DIDN'T LAST AND YOU DIDN'T HAVE MONEY TO GET MORE.: NEVER TRUE

## 2023-06-06 ASSESSMENT — PATIENT HEALTH QUESTIONNAIRE - PHQ9
SUM OF ALL RESPONSES TO PHQ QUESTIONS 1-9: 0
SUM OF ALL RESPONSES TO PHQ9 QUESTIONS 1 & 2: 0
1. LITTLE INTEREST OR PLEASURE IN DOING THINGS: 0
2. FEELING DOWN, DEPRESSED OR HOPELESS: 0
SUM OF ALL RESPONSES TO PHQ QUESTIONS 1-9: 0

## 2023-06-06 ASSESSMENT — LIFESTYLE VARIABLES
HOW MANY STANDARD DRINKS CONTAINING ALCOHOL DO YOU HAVE ON A TYPICAL DAY: 1 OR 2
HOW OFTEN DO YOU HAVE A DRINK CONTAINING ALCOHOL: MONTHLY OR LESS

## 2023-06-06 NOTE — PROGRESS NOTES
No chief complaint on file. 1. \"Have you been to the ER, urgent care clinic since your last visit? Hospitalized since your last visit? \" no  2. \"Have you seen or consulted any other health care providers outside of the [address] [place] [address] since your last visit? \" yes - eye doctor 6/5/23  3. For patients aged 39-70: Has the patient had a colonoscopy / FIT/ Cologuard? NA - based on age             Financial Resource Strain: Low Risk     Difficulty of Paying Living Expenses: Not hard at all      Food Insecurity: No Food Insecurity    Worried About [address] in the Last Year: Never true    [address] in the Last Year: Never true        No flowsheet data found. No flowsheet data found.      PHQ-9  6/6/2023   Little interest or pleasure in doing things 0   Little interest or pleasure in doing things -   Feeling down, depressed, or hopeless 0   PHQ-2 Score 0   Total Score PHQ 2 -   PHQ-9 Total Score 0       Health Maintenance Due   Topic Date Due    COVID-19 Vaccine (1) Never done    Pneumococcal 65+ years Vaccine (1 - PCV) Never done    Shingles vaccine (1 of 2) Never done
Authorizing Provider   amLODIPine (NORVASC) 10 MG tablet Take 1 tablet by mouth nightly Yes Radha Longoria MD   aspirin 81 MG EC tablet 1 pill Mon, Wed, Fri Yes Radha Longoria MD   atorvastatin (LIPITOR) 40 MG tablet Take 1 tablet by mouth daily Yes Radha Longoria MD   hydroCHLOROthiazide (HYDRODIURIL) 12.5 MG tablet Take 1 tablet by mouth in the morning and at bedtime Yes Radha Longoria MD   losartan (COZAAR) 100 MG tablet Take 1 tablet by mouth daily Yes Radha Longoria MD   metFORMIN (GLUCOPHAGE) 500 MG tablet Take 1 tablet by mouth 2 times daily (with meals) Yes Radha Longoria MD   metoprolol succinate (TOPROL XL) 25 MG extended release tablet Take 1 tablet by mouth daily Yes Radha Longoria MD   SITagliptin (JANUVIA) 50 MG tablet Take 2 tablets by mouth daily Yes Radha Longoria MD   Lancets MISC Use to check blood sugar once daily Yes Ar Automatic Reconciliation   tadalafil (CIALIS) 5 MG tablet Take 1 to 2 tabs at least 30 minutes prior to anticipated sexual activity as one single dose and not more than once daily.   Patient not taking: Reported on 6/6/2023  Ar Automatic Reconciliation       CareTeam (Including outside providers/suppliers regularly involved in providing care):   Patient Care Team:  Tianna Nelson MD as PCP - Empaneled Provider  Radha Longoria MD as Consulting Physician (Family Medicine)  Radha Longoria MD as Consulting Physician (Family Medicine)  Soham Caballero MD (Urology)     Reviewed and updated this visit:  Tobacco  Allergies  Meds  Med Hx  Surg Hx  Soc Hx  Fam Hx

## 2023-06-06 NOTE — PATIENT INSTRUCTIONS
download the FREE \"Exercise & Physical Activity: Your Everyday Guide\" from [employer]. Call 6-673.433.3222 or search [employer] online. You need 7402-5299 mg of calcium and 9215-2154 IU of vitamin D per day. It is possible to meet your calcium requirement with diet alone, but a vitamin D supplement is usually necessary to meet this goal.  When exposed to the sun, use a sunscreen that protects against both UVA and UVB radiation with an SPF of 30 or greater. Reapply every 2 to 3 hours or after sweating, drying off with a towel, or swimming. Always wear a seat belt when traveling in a car. Always wear a helmet when riding a bicycle or motorcycle.

## 2023-06-07 ENCOUNTER — TELEPHONE (OUTPATIENT)
Age: 83
End: 2023-06-07

## 2023-06-07 NOTE — TELEPHONE ENCOUNTER
MD Arvin Sparks stating the [address] and [address] 50mg is not covered. Rx: 6/6/23    Call plan to initiate PA:  [phone]   Patient ID: [id number]    Or change medication.   Thanks, Anup Joe    (Rx noted as 2 tabs daily 90 + 1)       For Pharmacy Admin Tracking Only    Program: Medication Refill  Intervention Detail: New Rx: 1, reason: Patient Preference  Time Spent (min): 5

## 2023-06-09 DIAGNOSIS — E11.21 TYPE 2 DIABETES MELLITUS WITH NEPHROPATHY (HCC): Primary | ICD-10-CM

## 2023-06-09 NOTE — TELEPHONE ENCOUNTER
Call and spoke to pharmacy  two ID confirmed. Pharmacy stated that the insurance will not pay for Januvia        Sig: Take 2 tablets by mouth daily   This medication has a 100 mg and it will pay for that dose instead of 2 doses of 50 mg daily.

## 2023-07-13 ENCOUNTER — OFFICE VISIT (OUTPATIENT)
Age: 83
End: 2023-07-13
Payer: MEDICARE

## 2023-07-13 VITALS
TEMPERATURE: 97 F | WEIGHT: 178.6 LBS | SYSTOLIC BLOOD PRESSURE: 138 MMHG | RESPIRATION RATE: 18 BRPM | DIASTOLIC BLOOD PRESSURE: 67 MMHG | BODY MASS INDEX: 30.49 KG/M2 | HEIGHT: 64 IN | HEART RATE: 54 BPM | OXYGEN SATURATION: 96 %

## 2023-07-13 DIAGNOSIS — I10 ESSENTIAL HYPERTENSION: Primary | ICD-10-CM

## 2023-07-13 DIAGNOSIS — N18.31 CHRONIC KIDNEY DISEASE, STAGE 3A (HCC): ICD-10-CM

## 2023-07-13 DIAGNOSIS — E83.52 HYPERCALCEMIA: ICD-10-CM

## 2023-07-13 PROCEDURE — 99214 OFFICE O/P EST MOD 30 MIN: CPT | Performed by: STUDENT IN AN ORGANIZED HEALTH CARE EDUCATION/TRAINING PROGRAM

## 2023-07-13 PROCEDURE — 3075F SYST BP GE 130 - 139MM HG: CPT | Performed by: STUDENT IN AN ORGANIZED HEALTH CARE EDUCATION/TRAINING PROGRAM

## 2023-07-13 PROCEDURE — 1123F ACP DISCUSS/DSCN MKR DOCD: CPT | Performed by: STUDENT IN AN ORGANIZED HEALTH CARE EDUCATION/TRAINING PROGRAM

## 2023-07-13 PROCEDURE — 3078F DIAST BP <80 MM HG: CPT | Performed by: STUDENT IN AN ORGANIZED HEALTH CARE EDUCATION/TRAINING PROGRAM

## 2023-07-13 RX ORDER — HYDROCHLOROTHIAZIDE 12.5 MG/1
12.5 TABLET ORAL 2 TIMES DAILY
Qty: 90 TABLET | Refills: 1 | Status: SHIPPED | OUTPATIENT
Start: 2023-07-13

## 2023-07-13 RX ORDER — LOSARTAN POTASSIUM 100 MG/1
100 TABLET ORAL DAILY
Qty: 90 TABLET | Refills: 1 | Status: SHIPPED | OUTPATIENT
Start: 2023-07-13

## 2023-07-13 NOTE — PROGRESS NOTES
Chief Complaint   Patient presents with    Hypertension       1. \"Have you been to the ER, urgent care clinic since your last visit? Hospitalized since your last visit? \" yes - ER appt. For kidney stone , having surgery next week   2. \"Have you seen or consulted any other health care providers outside of the [address] [place] [address] since your last visit? \" no  3. For patients aged 43-73: Has the patient had a colonoscopy / FIT/ Cologuard? NA - based on age           Financial Resource Strain: Low Risk     Difficulty of Paying Living Expenses: Not hard at all      Food Insecurity: No Food Insecurity    Worried About [place] in the Last Year: Never true    [address] in the Last Year: Never true        No flowsheet data found. No flowsheet data found.      PHQ-9  6/13/2023   Little interest or pleasure in doing things 0   Little interest or pleasure in doing things -   Feeling down, depressed, or hopeless 0   PHQ-2 Score 0   Total Score PHQ 2 -   PHQ-9 Total Score 0       Health Maintenance Due   Topic Date Due    COVID-19 Vaccine (1) Never done    Pneumococcal 65+ years Vaccine (1 - PCV) Never done    Shingles vaccine (1 of 2) Never done

## 2023-07-13 NOTE — PROGRESS NOTES
José Walker  81 y.o. male  1940  [address] [facility] [address]  [id number]     [facility]       Chief Complaint: HTN, high Ca  Source: self, his wife who accompanies him and the medical record     Subjective  José Walker is an 81 y.o. male who presents for Followup for BP. Taking metoprolol, losartan, HCTZ - once daily not BID as discussed at last visit, amlodipine daily. Not checking BP at home regularly but when he does it runs ~130-140/80s. Denies SOB, dizziness, HA. Saw cardiologist last week who recommended he get  an ECHO - has appt for ECHO 6/27 with BS - ordered at last visit due to loud systolic murmur appreciated on exam at that time. Elevated Ca on bloodwork:last month, no Ca supplements, no tums. No bone pain, no constipation + kidney stone - having operation next week for removal    ROS  Negative except what is mentioned in HPI      Allergies - reviewed:   No Known Allergies      Medications - reviewed:   Current Outpatient Medications   Medication Sig    SITagliptin (JANUVIA) 100 MG tablet Take 1 tablet by mouth daily    amLODIPine (NORVASC) 10 MG tablet Take 1 tablet by mouth nightly    aspirin 81 MG EC tablet 1 pill Mon, Wed, Fri    atorvastatin (LIPITOR) 40 MG tablet Take 1 tablet by mouth daily    hydroCHLOROthiazide (HYDRODIURIL) 12.5 MG tablet Take 1 tablet by mouth in the morning and at bedtime    losartan (COZAAR) 100 MG tablet Take 1 tablet by mouth daily    metFORMIN (GLUCOPHAGE) 500 MG tablet Take 1 tablet by mouth 2 times daily (with meals)    metoprolol succinate (TOPROL XL) 25 MG extended release tablet Take 1 tablet by mouth daily    Lancets MISC Use to check blood sugar once daily    tadalafil (CIALIS) 5 MG tablet      No current facility-administered medications for this visit.          Past Medical History - reviewed:  Past Medical History:   Diagnosis Date    Diabetes ([address])     type 2; niddm    Hypertension     Ill-defined

## 2023-07-14 LAB
ALBUMIN SERPL-MCNC: 3.7 G/DL (ref 3.5–5)
ALBUMIN/GLOB SERPL: 1.1 (ref 1.1–2.2)
ALP SERPL-CCNC: 90 U/L (ref 45–117)
ALT SERPL-CCNC: 32 U/L (ref 12–78)
ANION GAP SERPL CALC-SCNC: 7 MMOL/L (ref 5–15)
AST SERPL-CCNC: 22 U/L (ref 15–37)
BILIRUB SERPL-MCNC: 0.4 MG/DL (ref 0.2–1)
BUN SERPL-MCNC: 30 MG/DL (ref 6–20)
BUN/CREAT SERPL: 20 (ref 12–20)
CALCIUM SERPL-MCNC: 10.5 MG/DL (ref 8.5–10.1)
CALCIUM SERPL-MCNC: 10.5 MG/DL (ref 8.5–10.1)
CHLORIDE SERPL-SCNC: 107 MMOL/L (ref 97–108)
CO2 SERPL-SCNC: 27 MMOL/L (ref 21–32)
CREAT SERPL-MCNC: 1.49 MG/DL (ref 0.7–1.3)
GLOBULIN SER CALC-MCNC: 3.5 G/DL (ref 2–4)
GLUCOSE SERPL-MCNC: 84 MG/DL (ref 65–100)
POTASSIUM SERPL-SCNC: 4.2 MMOL/L (ref 3.5–5.1)
PROT SERPL-MCNC: 7.2 G/DL (ref 6.4–8.2)
PTH-INTACT SERPL-MCNC: 56.8 PG/ML (ref 18.4–88)
SODIUM SERPL-SCNC: 141 MMOL/L (ref 136–145)

## 2023-07-17 DIAGNOSIS — E83.52 HYPERCALCEMIA: Primary | ICD-10-CM

## 2023-07-27 ENCOUNTER — HOSPITAL ENCOUNTER (OUTPATIENT)
Facility: HOSPITAL | Age: 83
Discharge: HOME | End: 2023-07-29
Attending: STUDENT IN AN ORGANIZED HEALTH CARE EDUCATION/TRAINING PROGRAM
Payer: MEDICARE

## 2023-07-27 VITALS
WEIGHT: 178 LBS | BODY MASS INDEX: 30.39 KG/M2 | SYSTOLIC BLOOD PRESSURE: 137 MMHG | HEIGHT: 64 IN | DIASTOLIC BLOOD PRESSURE: 69 MMHG

## 2023-07-27 DIAGNOSIS — R01.1 HEART MURMUR: ICD-10-CM

## 2023-07-27 PROCEDURE — 93306 TTE W/DOPPLER COMPLETE: CPT

## 2023-07-30 LAB
ECHO AO ROOT DIAM: 3.7 CM
ECHO AO ROOT INDEX: 1.99 CM/M2
ECHO AV AREA PEAK VELOCITY: 1.6 CM2
ECHO AV AREA VTI: 2.1 CM2
ECHO AV AREA/BSA PEAK VELOCITY: 0.9 CM2/M2
ECHO AV AREA/BSA VTI: 1.1 CM2/M2
ECHO AV MEAN GRADIENT: 10 MMHG
ECHO AV MEAN VELOCITY: 1.5 M/S
ECHO AV PEAK GRADIENT: 21 MMHG
ECHO AV PEAK VELOCITY: 2.3 M/S
ECHO AV VELOCITY RATIO: 0.43
ECHO AV VTI: 42.8 CM
ECHO BSA: 1.91 M2
ECHO EST RA PRESSURE: 3 MMHG
ECHO LA DIAMETER INDEX: 2.37 CM/M2
ECHO LA DIAMETER: 4.4 CM
ECHO LA TO AORTIC ROOT RATIO: 1.19
ECHO LA VOL 2C: 84 ML (ref 18–58)
ECHO LA VOL 2C: 86 ML (ref 18–58)
ECHO LA VOL 4C: 79 ML (ref 18–58)
ECHO LA VOL 4C: 85 ML (ref 18–58)
ECHO LA VOL BP: 82 ML (ref 18–58)
ECHO LA VOL/BSA BIPLANE: 44 ML/M2 (ref 16–34)
ECHO LA VOLUME AREA LENGTH: 86 ML
ECHO LA VOLUME INDEX AREA LENGTH: 46 ML/M2 (ref 16–34)
ECHO LV E' LATERAL VELOCITY: 9 CM/S
ECHO LV E' SEPTAL VELOCITY: 9 CM/S
ECHO LV FRACTIONAL SHORTENING: 36 % (ref 28–44)
ECHO LV INTERNAL DIMENSION DIASTOLE INDEX: 2.69 CM/M2
ECHO LV INTERNAL DIMENSION DIASTOLIC: 5 CM (ref 4.2–5.9)
ECHO LV INTERNAL DIMENSION SYSTOLIC INDEX: 1.72 CM/M2
ECHO LV INTERNAL DIMENSION SYSTOLIC: 3.2 CM
ECHO LV IVSD: 1.1 CM (ref 0.6–1)
ECHO LV MASS 2D: 182 G (ref 88–224)
ECHO LV MASS INDEX 2D: 97.8 G/M2 (ref 49–115)
ECHO LV POSTERIOR WALL DIASTOLIC: 0.9 CM (ref 0.6–1)
ECHO LV RELATIVE WALL THICKNESS RATIO: 0.36
ECHO LVOT AREA: 3.8 CM2
ECHO LVOT AV VTI INDEX: 0.57
ECHO LVOT DIAM: 2.2 CM
ECHO LVOT MEAN GRADIENT: 2 MMHG
ECHO LVOT PEAK GRADIENT: 4 MMHG
ECHO LVOT PEAK VELOCITY: 1 M/S
ECHO LVOT STROKE VOLUME INDEX: 49.4 ML/M2
ECHO LVOT SV: 91.9 ML
ECHO LVOT VTI: 24.2 CM
ECHO MV A VELOCITY: 1.08 M/S
ECHO MV AREA PHT: 1.9 CM2
ECHO MV E DECELERATION TIME (DT): 389.9 MS
ECHO MV E VELOCITY: 0.64 M/S
ECHO MV E/A RATIO: 0.59
ECHO MV E/E' LATERAL: 7.11
ECHO MV E/E' RATIO (AVERAGED): 7.11
ECHO MV E/E' SEPTAL: 7.11
ECHO MV PRESSURE HALF TIME (PHT): 113.1 MS
ECHO PV MAX VELOCITY: 0.7 M/S
ECHO PV PEAK GRADIENT: 2 MMHG
ECHO RV FREE WALL PEAK S': 19 CM/S
ECHO RV INTERNAL DIMENSION: 3.9 CM
ECHO RV TAPSE: 1.9 CM (ref 1.7–?)

## 2023-08-04 NOTE — TELEPHONE ENCOUNTER
Refill request for metformin. New rx sent on 6/6/23 for 180 + 1. Contacted [facility] to advise. Rx to be filled. Thanks, [facility] Only    Program: Medication Refill  CPA in place:    Recommendation Provided To:    Intervention Detail: Discontinued Rx: 1, reason: Duplicate Therapy  Intervention Accepted By:   Carolee Ho Closed?:    Time Spent (min): 10

## 2023-08-07 NOTE — TELEPHONE ENCOUNTER
PCP: Caden Mariee MD    Last appt: [unfilled]  Future Appointments   Date Time Provider [facility] [address]   1/16/2024  8:40 AM Caden Mariee MD [facility] AM[facility]       Requested Prescriptions     Pending Prescriptions Disp Refills    CONTOUR NEXT TEST strip [Pharmacy Med Name: CONTOUR NEXT TEST STRIPS 100S] 100 strip      Sig: USE TO TEST ONCE DAILY

## 2023-08-07 NOTE — TELEPHONE ENCOUNTER
Refill requests for [facility] [place] [facility] and [place]. New rx sent on 6/9/23 for 90 d/s. Contacted Irene to advise. Thanksdana    For Pharmacy Admin Tracking Only    Program: Medication Refill  CPA in place:    Recommendation Provided To:    Intervention Detail: Discontinued Rx: 1, reason: Duplicate Therapy  Intervention Accepted By:   Guido Perkins Closed?:    Time Spent (min): 5

## 2023-08-08 RX ORDER — PERPHENAZINE 16 MG/1
TABLET, FILM COATED ORAL
Qty: 100 STRIP | Refills: 3 | Status: SHIPPED | OUTPATIENT
Start: 2023-08-08

## 2023-09-02 DIAGNOSIS — I10 ESSENTIAL HYPERTENSION: ICD-10-CM

## 2023-09-06 RX ORDER — HYDROCHLOROTHIAZIDE 12.5 MG/1
12.5 TABLET ORAL 2 TIMES DAILY
Qty: 180 TABLET | Refills: 0 | Status: SHIPPED | OUTPATIENT
Start: 2023-09-06

## 2023-09-06 NOTE — TELEPHONE ENCOUNTER
PCP: Milan Cast MD    Last appt: 7/13/2023       Future Appointments   Date Time Provider [address]   1/16/2024  8:40 AM Milan Cast MD [facility] AM[facility]       Requested Prescriptions     Pending Prescriptions Disp Refills    hydroCHLOROthiazide (HYDRODIURIL) 12.5 MG tablet [Pharmacy Med Name: HYDROCHLOROTHIAZIDE 12.5MG TABLETS] 180 tablet      Sig: TAKE 1 TABLET BY MOUTH IN THE MORNING AND AT BEDTIME       Prior labs and Blood pressures:  BP Readings from Last 3 Encounters:   07/27/23 137/69   07/13/23 138/67   06/13/23 (!) 151/70     Lab Results   Component Value Date/Time     07/13/2023 11:51 AM    K 4.2 07/13/2023 11:51 AM     07/13/2023 11:51 AM    CO2 27 07/13/2023 11:51 AM    BUN 30 07/13/2023 11:51 AM    GFRAA >60 06/01/2022 10:22 AM     No results found for: HBA1C, EXY1RBKF  Lab Results   Component Value Date/Time    CHOL 147 12/02/2022 10:24 AM    HDL 56 12/02/2022 10:24 AM     No results found for: VITD3, VD3RIA    Lab Results   Component Value Date/Time    TSH 1.15 12/02/2022 10:24 AM

## 2023-09-11 DIAGNOSIS — E11.21 TYPE 2 DIABETES MELLITUS WITH NEPHROPATHY (HCC): ICD-10-CM

## 2023-09-11 RX ORDER — SITAGLIPTIN 100 MG/1
100 TABLET, FILM COATED ORAL DAILY
Qty: 90 TABLET | Refills: 1 | Status: SHIPPED | OUTPATIENT
Start: 2023-09-11

## 2023-09-11 NOTE — TELEPHONE ENCOUNTER
PCP: Roger Mora MD    Last appt: 7/13/2023     Future Appointments   Date Time Provider [address]   1/16/2024  8:40 AM Roger Mora MD [facility] AM[facility]       Requested Prescriptions     Pending Prescriptions Disp Refills    JANUVIA 100 MG tablet [Pharmacy Med Name: Jane Gal 100MG TABLETS] 90 tablet 0     Sig: TAKE 1 TABLET BY MOUTH DAILY       Prior labs and Blood pressures:  BP Readings from Last 3 Encounters:   07/27/23 137/69   07/13/23 138/67   06/13/23 (!) 151/70     Lab Results   Component Value Date/Time     07/13/2023 11:51 AM    K 4.2 07/13/2023 11:51 AM     07/13/2023 11:51 AM    CO2 27 07/13/2023 11:51 AM    BUN 30 07/13/2023 11:51 AM    GFRAA >60 06/01/2022 10:22 AM     No results found for: \"HBA1C\", \"[id number]\"  Lab Results   Component Value Date/Time    CHOL 147 12/02/2022 10:24 AM    HDL 56 12/02/2022 10:24 AM     No results found for: \"VITD3\", \"VD3RIA\"    Lab Results   Component Value Date/Time    TSH 1.15 12/02/2022 10:24 AM

## 2023-12-04 DIAGNOSIS — I10 ESSENTIAL HYPERTENSION: ICD-10-CM

## 2023-12-06 RX ORDER — HYDROCHLOROTHIAZIDE 12.5 MG/1
12.5 TABLET ORAL 2 TIMES DAILY
Qty: 180 TABLET | Refills: 0 | Status: SHIPPED | OUTPATIENT
Start: 2023-12-06

## 2023-12-06 NOTE — TELEPHONE ENCOUNTER
Last appointment: 7/13/23 MD Chad Chu  Next appointment: 1/16/24 Chad Chu  Previous refill encounter(s): 9/6/23 180    Requested Prescriptions     Pending Prescriptions Disp Refills    hydroCHLOROthiazide (HYDRODIURIL) 12.5 MG tablet [Pharmacy Med Name: HYDROCHLOROTHIAZIDE 12.5MG TABLETS] 180 tablet 0     Sig: TAKE 1 TABLET BY MOUTH IN THE MORNING AND AT BEDTIME     For Pharmacy Admin Tracking Only    Program: Medication Refill  CPA in place:    Recommendation Provided To:    Intervention Detail: New Rx: 1, reason: Patient Preference  Intervention Accepted By:   Jasmeet Mccain Closed?:    Time Spent (min): 5

## 2024-01-16 ENCOUNTER — OFFICE VISIT (OUTPATIENT)
Age: 84
End: 2024-01-16
Payer: MEDICARE

## 2024-01-16 VITALS
SYSTOLIC BLOOD PRESSURE: 122 MMHG | HEIGHT: 64 IN | TEMPERATURE: 96.8 F | DIASTOLIC BLOOD PRESSURE: 70 MMHG | WEIGHT: 185.4 LBS | HEART RATE: 50 BPM | BODY MASS INDEX: 31.65 KG/M2 | OXYGEN SATURATION: 95 % | RESPIRATION RATE: 16 BRPM

## 2024-01-16 DIAGNOSIS — I10 ESSENTIAL HYPERTENSION: ICD-10-CM

## 2024-01-16 DIAGNOSIS — E11.21 TYPE 2 DIABETES MELLITUS WITH NEPHROPATHY (HCC): Primary | ICD-10-CM

## 2024-01-16 DIAGNOSIS — N52.9 VASCULOGENIC ERECTILE DYSFUNCTION, UNSPECIFIED VASCULOGENIC ERECTILE DYSFUNCTION TYPE: ICD-10-CM

## 2024-01-16 DIAGNOSIS — I25.10 CORONARY ARTERY DISEASE INVOLVING NATIVE HEART WITHOUT ANGINA PECTORIS, UNSPECIFIED VESSEL OR LESION TYPE: ICD-10-CM

## 2024-01-16 DIAGNOSIS — E83.52 HYPERCALCEMIA: ICD-10-CM

## 2024-01-16 LAB
ALBUMIN SERPL-MCNC: 3.7 G/DL (ref 3.5–5)
ALBUMIN/GLOB SERPL: 1.1 (ref 1.1–2.2)
ALP SERPL-CCNC: 86 U/L (ref 45–117)
ALT SERPL-CCNC: 25 U/L (ref 12–78)
ANION GAP SERPL CALC-SCNC: 5 MMOL/L (ref 5–15)
AST SERPL-CCNC: 16 U/L (ref 15–37)
BILIRUB SERPL-MCNC: 0.4 MG/DL (ref 0.2–1)
BUN SERPL-MCNC: 27 MG/DL (ref 6–20)
BUN/CREAT SERPL: 21 (ref 12–20)
CALCIUM SERPL-MCNC: 9.5 MG/DL (ref 8.5–10.1)
CHLORIDE SERPL-SCNC: 108 MMOL/L (ref 97–108)
CHOLEST SERPL-MCNC: 140 MG/DL
CO2 SERPL-SCNC: 27 MMOL/L (ref 21–32)
CREAT SERPL-MCNC: 1.3 MG/DL (ref 0.7–1.3)
EST. AVERAGE GLUCOSE BLD GHB EST-MCNC: 134 MG/DL
GLOBULIN SER CALC-MCNC: 3.3 G/DL (ref 2–4)
GLUCOSE SERPL-MCNC: 147 MG/DL (ref 65–100)
HBA1C MFR BLD: 6.3 % (ref 4–5.6)
HDLC SERPL-MCNC: 47 MG/DL
HDLC SERPL: 3 (ref 0–5)
LDLC SERPL CALC-MCNC: 72.6 MG/DL (ref 0–100)
POTASSIUM SERPL-SCNC: 4 MMOL/L (ref 3.5–5.1)
PROT SERPL-MCNC: 7 G/DL (ref 6.4–8.2)
SODIUM SERPL-SCNC: 140 MMOL/L (ref 136–145)
TRIGL SERPL-MCNC: 102 MG/DL
VLDLC SERPL CALC-MCNC: 20.4 MG/DL

## 2024-01-16 PROCEDURE — 3074F SYST BP LT 130 MM HG: CPT | Performed by: STUDENT IN AN ORGANIZED HEALTH CARE EDUCATION/TRAINING PROGRAM

## 2024-01-16 PROCEDURE — 99215 OFFICE O/P EST HI 40 MIN: CPT | Performed by: STUDENT IN AN ORGANIZED HEALTH CARE EDUCATION/TRAINING PROGRAM

## 2024-01-16 PROCEDURE — 3078F DIAST BP <80 MM HG: CPT | Performed by: STUDENT IN AN ORGANIZED HEALTH CARE EDUCATION/TRAINING PROGRAM

## 2024-01-16 PROCEDURE — 1123F ACP DISCUSS/DSCN MKR DOCD: CPT | Performed by: STUDENT IN AN ORGANIZED HEALTH CARE EDUCATION/TRAINING PROGRAM

## 2024-01-16 RX ORDER — TADALAFIL 5 MG/1
TABLET ORAL
Qty: 30 TABLET | Refills: 0 | Status: SHIPPED | OUTPATIENT
Start: 2024-01-16 | End: 2024-01-16

## 2024-01-16 RX ORDER — METOPROLOL SUCCINATE 25 MG/1
25 TABLET, EXTENDED RELEASE ORAL DAILY
Qty: 90 TABLET | Refills: 1 | Status: SHIPPED | OUTPATIENT
Start: 2024-01-16

## 2024-01-16 RX ORDER — ATORVASTATIN CALCIUM 40 MG/1
40 TABLET, FILM COATED ORAL DAILY
Qty: 90 TABLET | Refills: 1 | Status: SHIPPED | OUTPATIENT
Start: 2024-01-16

## 2024-01-16 RX ORDER — AMLODIPINE BESYLATE 10 MG/1
10 TABLET ORAL NIGHTLY
Qty: 90 TABLET | Refills: 1 | Status: SHIPPED | OUTPATIENT
Start: 2024-01-16

## 2024-01-16 RX ORDER — HYDROCHLOROTHIAZIDE 12.5 MG/1
12.5 TABLET ORAL 2 TIMES DAILY
Qty: 180 TABLET | Refills: 0 | Status: SHIPPED | OUTPATIENT
Start: 2024-01-16

## 2024-01-16 RX ORDER — LOSARTAN POTASSIUM 100 MG/1
100 TABLET ORAL DAILY
Qty: 90 TABLET | Refills: 1 | Status: SHIPPED | OUTPATIENT
Start: 2024-01-16

## 2024-01-16 RX ORDER — TADALAFIL 5 MG/1
TABLET ORAL
Qty: 30 TABLET | Refills: 2 | Status: SHIPPED | OUTPATIENT
Start: 2024-01-16

## 2024-01-16 ASSESSMENT — PATIENT HEALTH QUESTIONNAIRE - PHQ9
2. FEELING DOWN, DEPRESSED OR HOPELESS: 0
SUM OF ALL RESPONSES TO PHQ9 QUESTIONS 1 & 2: 0
1. LITTLE INTEREST OR PLEASURE IN DOING THINGS: 0
SUM OF ALL RESPONSES TO PHQ QUESTIONS 1-9: 0

## 2024-01-16 NOTE — PROGRESS NOTES
Chief Complaint   Patient presents with    Hypertension    Diabetes     \"Have you been to the ER, urgent care clinic since your last visit?  Hospitalized since your last visit?\"    YES - When: approximately 6 months ago.  Where and Why: [facility].    “Have you seen or consulted any other health care providers outside of [facility] since your last visit?”    NO                   1/16/2024     8:28 AM   PHQ-9    Little interest or pleasure in doing things 0   Feeling down, depressed, or hopeless 0   PHQ-2 Score 0   PHQ-9 Total Score 0           Financial Resource Strain: Low Risk  (6/6/2023)    Overall Financial Resource Strain (CARDIA)     Difficulty of Paying Living Expenses: Not hard at all      Food Insecurity: Not on file (6/6/2023)          Health Maintenance Due   Topic Date Due    COVID-19 Vaccine (1) Never done    Pneumococcal 65+ years Vaccine (1 - PCV) Never done    Shingles vaccine (1 of 2) Never done    Respiratory Syncytial Virus (RSV) Pregnant or age 60 yrs+ (1 - 1-dose 60+ series) Never done    Flu vaccine (1) Never done    Lipids  12/02/2023    Annual Wellness Visit (Medicare Advantage)  01/01/2024       
Dispense: 90 tablet; Refill: 1  - metFORMIN (GLUCOPHAGE) 500 MG tablet; Take 1 tablet by mouth 2 times daily (with meals)  Dispense: 180 tablet; Refill: 1  - Lipid Panel  - Comprehensive Metabolic Panel  - Hemoglobin A1C    2. Essential hypertension: BP at goal today and per him  - amLODIPine (NORVASC) 10 MG tablet; Take 1 tablet by mouth nightly  Dispense: 90 tablet; Refill: 1  - losartan (COZAAR) 100 MG tablet; Take 1 tablet by mouth daily  Dispense: 90 tablet; Refill: 1  - hydroCHLOROthiazide (HYDRODIURIL) 12.5 MG tablet; Take 1 tablet by mouth in the morning and at bedtime  Dispense: 180 tablet; Refill: 0  - metoprolol succinate (TOPROL XL) 25 MG extended release tablet; Take 1 tablet by mouth daily  Dispense: 90 tablet; Refill: 1  - Comprehensive Metabolic Panel    3. Hypercalcemia  - discussed labs and advised to scheduled for para/thyroid US, will check Ca today before scheduling as concerned abotu cost associated with US    4. Coronary artery disease involving native heart without angina pectoris, unspecified vessel or lesion type  - metoprolol succinate (TOPROL XL) 25 MG extended release tablet; Take 1 tablet by mouth daily  Dispense: 90 tablet; Refill: 1  - atorvastatin (LIPITOR) 40 MG tablet; Take 1 tablet by mouth daily  Dispense: 90 tablet; Refill: 1    5. Vasculogenic erectile dysfunction, unspecified vasculogenic erectile dysfunction type  - tadalafil (CIALIS) 5 MG tablet; Take one tab as needed prior to anticipated sxual activity. Do not take more than one tab daily  Dispense: 30 tablet; Refill: 2      Patient informed to follow up:   Return in about 6 months (around 7/16/2024) for medicare wellness, f/u chronic conditions (DM, HTN).      I have discussed the diagnosis with the patient and the intended plan as seen in the above orders. Patient verbalized understanding of the plan and agrees with the plan. The patient has received an after-visit summary and questions were answered concerning future

## 2024-02-01 DIAGNOSIS — I25.10 CORONARY ARTERY DISEASE INVOLVING NATIVE HEART WITHOUT ANGINA PECTORIS, UNSPECIFIED VESSEL OR LESION TYPE: ICD-10-CM

## 2024-02-01 DIAGNOSIS — I10 ESSENTIAL HYPERTENSION: ICD-10-CM

## 2024-02-02 RX ORDER — METOPROLOL SUCCINATE 25 MG/1
25 TABLET, EXTENDED RELEASE ORAL DAILY
Qty: 90 TABLET | Refills: 1 | OUTPATIENT
Start: 2024-02-02

## 2024-06-07 DIAGNOSIS — I10 ESSENTIAL HYPERTENSION: ICD-10-CM

## 2024-06-07 NOTE — TELEPHONE ENCOUNTER
PCP: Alee Mistry MD    Last appt: 1/16/2024     Future Appointments   Date Time Provider Department Center   7/15/2024  9:00 AM Farhana Matthews DO [facility] AM[facility]       Requested Prescriptions     Pending Prescriptions Disp Refills    hydroCHLOROthiazide 12.5 MG tablet [Pharmacy Med Name: HYDROCHLOROTHIAZIDE 12.5MG TABLETS] 180 tablet 0     Sig: TAKE 1 TABLET BY MOUTH IN THE MORNING AND AT BEDTIME       Prior labs and Blood pressures:  BP Readings from Last 3 Encounters:   01/16/24 122/70   07/27/23 137/69   07/13/23 138/67     Lab Results   Component Value Date/Time     01/16/2024 09:19 AM    K 4.0 01/16/2024 09:19 AM     01/16/2024 09:19 AM    CO2 27 01/16/2024 09:19 AM    BUN 27 01/16/2024 09:19 AM    GFRAA >60 06/01/2022 10:22 AM     No results found for: \"HBA1C\", \"EUK8EEKT\"  Lab Results   Component Value Date/Time    CHOL 140 01/16/2024 09:19 AM    HDL 47 01/16/2024 09:19 AM    LDL 72.6 01/16/2024 09:19 AM    VLDL 20.4 01/16/2024 09:19 AM     No results found for: \"VITD3\"    Lab Results   Component Value Date/Time    TSH 1.15 12/02/2022 10:24 AM

## 2024-06-09 RX ORDER — HYDROCHLOROTHIAZIDE 12.5 MG/1
12.5 TABLET ORAL 2 TIMES DAILY
Qty: 180 TABLET | Refills: 0 | Status: SHIPPED | OUTPATIENT
Start: 2024-06-09

## 2024-07-15 ENCOUNTER — OFFICE VISIT (OUTPATIENT)
Age: 84
End: 2024-07-15
Payer: MEDICARE

## 2024-07-15 VITALS
OXYGEN SATURATION: 99 % | BODY MASS INDEX: 30.83 KG/M2 | RESPIRATION RATE: 14 BRPM | HEIGHT: 64 IN | WEIGHT: 180.6 LBS | SYSTOLIC BLOOD PRESSURE: 153 MMHG | HEART RATE: 51 BPM | DIASTOLIC BLOOD PRESSURE: 71 MMHG | TEMPERATURE: 97.8 F

## 2024-07-15 DIAGNOSIS — E11.21 TYPE 2 DIABETES MELLITUS WITH NEPHROPATHY (HCC): ICD-10-CM

## 2024-07-15 DIAGNOSIS — I25.10 CORONARY ARTERY DISEASE INVOLVING NATIVE HEART WITHOUT ANGINA PECTORIS, UNSPECIFIED VESSEL OR LESION TYPE: ICD-10-CM

## 2024-07-15 DIAGNOSIS — I10 ESSENTIAL HYPERTENSION: ICD-10-CM

## 2024-07-15 DIAGNOSIS — N52.9 VASCULOGENIC ERECTILE DYSFUNCTION, UNSPECIFIED VASCULOGENIC ERECTILE DYSFUNCTION TYPE: ICD-10-CM

## 2024-07-15 DIAGNOSIS — E11.21 TYPE 2 DIABETES MELLITUS WITH NEPHROPATHY (HCC): Primary | ICD-10-CM

## 2024-07-15 LAB
ANION GAP SERPL CALC-SCNC: 10 MMOL/L (ref 5–15)
BUN SERPL-MCNC: 31 MG/DL (ref 6–20)
BUN/CREAT SERPL: 19 (ref 12–20)
CALCIUM SERPL-MCNC: 9.9 MG/DL (ref 8.5–10.1)
CHLORIDE SERPL-SCNC: 106 MMOL/L (ref 97–108)
CO2 SERPL-SCNC: 25 MMOL/L (ref 21–32)
CREAT SERPL-MCNC: 1.62 MG/DL (ref 0.7–1.3)
EST. AVERAGE GLUCOSE BLD GHB EST-MCNC: 128 MG/DL
GLUCOSE SERPL-MCNC: 130 MG/DL (ref 65–100)
HBA1C MFR BLD: 6.1 % (ref 4–5.6)
POTASSIUM SERPL-SCNC: 3.9 MMOL/L (ref 3.5–5.1)
SODIUM SERPL-SCNC: 141 MMOL/L (ref 136–145)

## 2024-07-15 PROCEDURE — 99214 OFFICE O/P EST MOD 30 MIN: CPT | Performed by: STUDENT IN AN ORGANIZED HEALTH CARE EDUCATION/TRAINING PROGRAM

## 2024-07-15 PROCEDURE — 1123F ACP DISCUSS/DSCN MKR DOCD: CPT | Performed by: STUDENT IN AN ORGANIZED HEALTH CARE EDUCATION/TRAINING PROGRAM

## 2024-07-15 PROCEDURE — G2211 COMPLEX E/M VISIT ADD ON: HCPCS | Performed by: STUDENT IN AN ORGANIZED HEALTH CARE EDUCATION/TRAINING PROGRAM

## 2024-07-15 PROCEDURE — 3077F SYST BP >= 140 MM HG: CPT | Performed by: STUDENT IN AN ORGANIZED HEALTH CARE EDUCATION/TRAINING PROGRAM

## 2024-07-15 PROCEDURE — 3044F HG A1C LEVEL LT 7.0%: CPT | Performed by: STUDENT IN AN ORGANIZED HEALTH CARE EDUCATION/TRAINING PROGRAM

## 2024-07-15 PROCEDURE — 3078F DIAST BP <80 MM HG: CPT | Performed by: STUDENT IN AN ORGANIZED HEALTH CARE EDUCATION/TRAINING PROGRAM

## 2024-07-15 RX ORDER — TADALAFIL 5 MG/1
TABLET ORAL
Qty: 30 TABLET | Refills: 2 | Status: SHIPPED | OUTPATIENT
Start: 2024-07-15

## 2024-07-15 RX ORDER — METOPROLOL SUCCINATE 25 MG/1
25 TABLET, EXTENDED RELEASE ORAL DAILY
Qty: 90 TABLET | Refills: 1 | Status: SHIPPED | OUTPATIENT
Start: 2024-07-15

## 2024-07-15 RX ORDER — HYDROCHLOROTHIAZIDE 25 MG/1
25 TABLET ORAL EVERY MORNING
Qty: 90 TABLET | Refills: 1 | Status: SHIPPED | OUTPATIENT
Start: 2024-07-15

## 2024-07-15 RX ORDER — HYDROCHLOROTHIAZIDE 12.5 MG/1
12.5 TABLET ORAL 2 TIMES DAILY
Qty: 180 TABLET | Refills: 0 | Status: CANCELLED | OUTPATIENT
Start: 2024-07-15

## 2024-07-15 RX ORDER — ASPIRIN 81 MG/1
TABLET ORAL
Qty: 90 TABLET | Refills: 0 | Status: SHIPPED | OUTPATIENT
Start: 2024-07-15

## 2024-07-15 RX ORDER — ATORVASTATIN CALCIUM 40 MG/1
40 TABLET, FILM COATED ORAL DAILY
Qty: 90 TABLET | Refills: 1 | Status: SHIPPED | OUTPATIENT
Start: 2024-07-15

## 2024-07-15 RX ORDER — LOSARTAN POTASSIUM 100 MG/1
100 TABLET ORAL DAILY
Qty: 90 TABLET | Refills: 1 | Status: SHIPPED | OUTPATIENT
Start: 2024-07-15

## 2024-07-15 RX ORDER — AMLODIPINE BESYLATE 10 MG/1
10 TABLET ORAL NIGHTLY
Qty: 90 TABLET | Refills: 1 | Status: SHIPPED | OUTPATIENT
Start: 2024-07-15

## 2024-07-15 SDOH — ECONOMIC STABILITY: FOOD INSECURITY: WITHIN THE PAST 12 MONTHS, YOU WORRIED THAT YOUR FOOD WOULD RUN OUT BEFORE YOU GOT MONEY TO BUY MORE.: NEVER TRUE

## 2024-07-15 SDOH — ECONOMIC STABILITY: INCOME INSECURITY: HOW HARD IS IT FOR YOU TO PAY FOR THE VERY BASICS LIKE FOOD, HOUSING, MEDICAL CARE, AND HEATING?: NOT HARD AT ALL

## 2024-07-15 SDOH — ECONOMIC STABILITY: FOOD INSECURITY: WITHIN THE PAST 12 MONTHS, THE FOOD YOU BOUGHT JUST DIDN'T LAST AND YOU DIDN'T HAVE MONEY TO GET MORE.: NEVER TRUE

## 2024-07-15 NOTE — PROGRESS NOTES
Chief Complaint   Patient presents with    New Patient     Establish Care - Previous PCP: Dr. Alee Mistry    Diabetes     6 month follow up    Hypertension     6 month follow up     \"Have you been to the ER, urgent care clinic since your last visit?  Hospitalized since your last visit?\"    NO    “Have you seen or consulted any other health care providers outside of [facility] since your last visit?”    NO                   1/16/2024     8:28 AM   PHQ-9    Little interest or pleasure in doing things 0   Feeling down, depressed, or hopeless 0   PHQ-2 Score 0   PHQ-9 Total Score 0           Financial Resource Strain: Low Risk  (6/6/2023)    Overall Financial Resource Strain (CARDIA)     Difficulty of Paying Living Expenses: Not hard at all      Food Insecurity: Not on file (6/6/2023)          Health Maintenance Due   Topic Date Due    COVID-19 Vaccine (1) Never done    Pneumococcal 65+ years Vaccine (1 of 2 - PCV) Never done    Shingles vaccine (1 of 2) Never done    Respiratory Syncytial Virus (RSV) Pregnant or age 60 yrs+ (1 - 1-dose 60+ series) Never done    Annual Wellness Visit (Medicare Advantage)  01/01/2024

## 2024-07-15 NOTE — PROGRESS NOTES
[facility]      Chief Complaint:     Chief Complaint   Patient presents with    New Patient     Establish Care - Previous PCP: Dr. Alee Mistry    Diabetes     6 month follow up    Hypertension     6 month follow up       Rush Rayo is a 84 y.o. male that presents for: 6 month follow up      Assessment/Plan:     Rush was seen today for new patient, diabetes and hypertension.    Diagnoses and all orders for this visit:    Essential hypertension    Coronary artery disease involving native heart without angina pectoris, unspecified vessel or lesion type    Type 2 diabetes mellitus with nephropathy (HCC)    Vasculogenic erectile dysfunction, unspecified vasculogenic erectile dysfunction type           Follow up:     No follow-ups on file.    Subjective:   HPI:  Rush Rayo is a 84 y.o. male that presents for:    Social History     Social History Narrative      2 kids and 4 grandkids live closeby  Originally from [place]     PCP Fede    CC:  BP at home usually 130s-140s, rarely 170s  Denies HA, change in vision, chest pain, sob, or leg swelling.      MedHX:  HTN, CAD:  taking losartan 100mg, amlodipine 10mg, HCTZ 12.5mg once daily metoprolol 25mg, ASA 81mg, Lipitor 40mg. Sees Cardiology ([facility]). Increasing HCTZ to 25mg  DM: A1C 6.3 on 1/16/24. taking januvia 100mg daily, metformin 500mg BID with meals. Fasting BGs 130-150s. C/w lipitor last A1c 6.1% 6/2023  Hyper Ca: Ca persistently elevated, has not scheduled parathyroid US yet. The kidney stone he had \"went away\" no longer needed to have it removed  ED: cialis - uses 1x/week at most     Health Maintenance:  Health Maintenance Due   Topic Date Due    COVID-19 Vaccine (1) Never done    Pneumococcal 65+ years Vaccine (1 of 2 - PCV) Never done    Shingles vaccine (1 of 2) Never done    Respiratory Syncytial Virus (RSV) Pregnant or age 60 yrs+ (1 - 1-dose 60+ series) Never done    Annual Wellness Visit (Medicare Advantage)

## 2024-07-16 ENCOUNTER — TELEPHONE (OUTPATIENT)
Age: 84
End: 2024-07-16

## 2024-07-16 NOTE — TELEPHONE ENCOUNTER
----- Message from Farhana Matthews DO sent at 7/16/2024  7:28 AM EDT -----  Can you make sure he gets on someone's schedule in about a month for blood pressure check and to recheck labs?

## 2024-07-26 DIAGNOSIS — E11.21 TYPE 2 DIABETES MELLITUS WITH NEPHROPATHY (HCC): ICD-10-CM

## 2024-07-26 DIAGNOSIS — I10 ESSENTIAL HYPERTENSION: ICD-10-CM

## 2024-07-26 DIAGNOSIS — I25.10 CORONARY ARTERY DISEASE INVOLVING NATIVE HEART WITHOUT ANGINA PECTORIS, UNSPECIFIED VESSEL OR LESION TYPE: ICD-10-CM

## 2024-07-31 RX ORDER — METOPROLOL SUCCINATE 25 MG/1
25 TABLET, EXTENDED RELEASE ORAL DAILY
Qty: 90 TABLET | Refills: 1 | OUTPATIENT
Start: 2024-07-31

## 2024-12-17 ENCOUNTER — OFFICE VISIT (OUTPATIENT)
Age: 84
End: 2024-12-17
Payer: MEDICARE

## 2024-12-17 VITALS
OXYGEN SATURATION: 95 % | HEIGHT: 64 IN | SYSTOLIC BLOOD PRESSURE: 153 MMHG | BODY MASS INDEX: 31.58 KG/M2 | HEART RATE: 52 BPM | TEMPERATURE: 97.5 F | RESPIRATION RATE: 16 BRPM | DIASTOLIC BLOOD PRESSURE: 73 MMHG | WEIGHT: 185 LBS

## 2024-12-17 DIAGNOSIS — C67.9 MALIGNANT NEOPLASM OF URINARY BLADDER, UNSPECIFIED SITE (HCC): ICD-10-CM

## 2024-12-17 DIAGNOSIS — E11.21 TYPE 2 DIABETES MELLITUS WITH NEPHROPATHY (HCC): ICD-10-CM

## 2024-12-17 DIAGNOSIS — I10 ESSENTIAL HYPERTENSION: Primary | ICD-10-CM

## 2024-12-17 DIAGNOSIS — E11.3293 MILD NONPROLIFERATIVE DIABETIC RETINOPATHY OF BOTH EYES WITHOUT MACULAR EDEMA ASSOCIATED WITH TYPE 2 DIABETES MELLITUS (HCC): ICD-10-CM

## 2024-12-17 DIAGNOSIS — Z00.00 MEDICARE ANNUAL WELLNESS VISIT, SUBSEQUENT: ICD-10-CM

## 2024-12-17 DIAGNOSIS — I25.10 CORONARY ARTERY DISEASE INVOLVING NATIVE HEART WITHOUT ANGINA PECTORIS, UNSPECIFIED VESSEL OR LESION TYPE: ICD-10-CM

## 2024-12-17 PROBLEM — K21.9 GASTROESOPHAGEAL REFLUX DISEASE: Status: RESOLVED | Noted: 2021-06-01 | Resolved: 2024-12-17

## 2024-12-17 LAB
ALBUMIN SERPL-MCNC: 4 G/DL (ref 3.5–5)
ALBUMIN/GLOB SERPL: 1.3 (ref 1.1–2.2)
ALP SERPL-CCNC: 96 U/L (ref 45–117)
ALT SERPL-CCNC: 24 U/L (ref 12–78)
ANION GAP SERPL CALC-SCNC: 4 MMOL/L (ref 2–12)
AST SERPL-CCNC: 17 U/L (ref 15–37)
BILIRUB SERPL-MCNC: 0.5 MG/DL (ref 0.2–1)
BUN SERPL-MCNC: 26 MG/DL (ref 6–20)
BUN/CREAT SERPL: 19 (ref 12–20)
CALCIUM SERPL-MCNC: 10.4 MG/DL (ref 8.5–10.1)
CHLORIDE SERPL-SCNC: 107 MMOL/L (ref 97–108)
CHOLEST SERPL-MCNC: 163 MG/DL
CO2 SERPL-SCNC: 28 MMOL/L (ref 21–32)
CREAT SERPL-MCNC: 1.39 MG/DL (ref 0.7–1.3)
CREAT UR-MCNC: 77.3 MG/DL
EST. AVERAGE GLUCOSE BLD GHB EST-MCNC: 134 MG/DL
GLOBULIN SER CALC-MCNC: 3 G/DL (ref 2–4)
GLUCOSE SERPL-MCNC: 131 MG/DL (ref 65–100)
HBA1C MFR BLD: 6.3 % (ref 4–5.6)
HDLC SERPL-MCNC: 59 MG/DL
HDLC SERPL: 2.8 (ref 0–5)
LDLC SERPL CALC-MCNC: 69.8 MG/DL (ref 0–100)
MICROALBUMIN UR-MCNC: 25.2 MG/DL
MICROALBUMIN/CREAT UR-RTO: 326 MG/G (ref 0–30)
POTASSIUM SERPL-SCNC: 4.3 MMOL/L (ref 3.5–5.1)
PROT SERPL-MCNC: 7 G/DL (ref 6.4–8.2)
SODIUM SERPL-SCNC: 139 MMOL/L (ref 136–145)
TRIGL SERPL-MCNC: 171 MG/DL
VLDLC SERPL CALC-MCNC: 34.2 MG/DL

## 2024-12-17 PROCEDURE — 99214 OFFICE O/P EST MOD 30 MIN: CPT | Performed by: STUDENT IN AN ORGANIZED HEALTH CARE EDUCATION/TRAINING PROGRAM

## 2024-12-17 ASSESSMENT — PATIENT HEALTH QUESTIONNAIRE - PHQ9
SUM OF ALL RESPONSES TO PHQ9 QUESTIONS 1 & 2: 0
SUM OF ALL RESPONSES TO PHQ QUESTIONS 1-9: 0
SUM OF ALL RESPONSES TO PHQ QUESTIONS 1-9: 0
2. FEELING DOWN, DEPRESSED OR HOPELESS: NOT AT ALL
SUM OF ALL RESPONSES TO PHQ QUESTIONS 1-9: 0
1. LITTLE INTEREST OR PLEASURE IN DOING THINGS: NOT AT ALL
SUM OF ALL RESPONSES TO PHQ QUESTIONS 1-9: 0

## 2024-12-17 ASSESSMENT — LIFESTYLE VARIABLES
HOW MANY STANDARD DRINKS CONTAINING ALCOHOL DO YOU HAVE ON A TYPICAL DAY: PATIENT DOES NOT DRINK
HOW OFTEN DO YOU HAVE A DRINK CONTAINING ALCOHOL: NEVER

## 2024-12-17 NOTE — ASSESSMENT & PLAN NOTE
Chronic, at goal (stable), continue current treatment plan    Orders:    Lipid Panel; Future    Lipid Panel

## 2024-12-17 NOTE — PATIENT INSTRUCTIONS
for you.  A list of these orders (if applicable) as well as your Preventive Care list are included within your After Visit Summary for your review.    Other Preventive Recommendations:    A preventive eye exam performed by an eye specialist is recommended every 1-2 years to screen for glaucoma; cataracts, macular degeneration, and other eye disorders.  A preventive dental visit is recommended every 6 months.  Try to get at least 150 minutes of exercise per week or 10,000 steps per day on a pedometer .  Order or download the FREE \"Exercise & Physical Activity: Your Everyday Guide\" from [employer]. Call 1-984.385.7702 or search [employer] online.  You need 4201-2601 mg of calcium and 8166-1853 IU of vitamin D per day. It is possible to meet your calcium requirement with diet alone, but a vitamin D supplement is usually necessary to meet this goal.  When exposed to the sun, use a sunscreen that protects against both UVA and UVB radiation with an SPF of 30 or greater. Reapply every 2 to 3 hours or after sweating, drying off with a towel, or swimming.  Always wear a seat belt when traveling in a car. Always wear a helmet when riding a bicycle or motorcycle.

## 2024-12-17 NOTE — PROGRESS NOTES
Chief Complaint   Patient presents with    Medicare AWV    Established New Doctor     Was Dr. Matthews's patient     \"Have you been to the ER, urgent care clinic since your last visit?  Hospitalized since your last visit?\"    NO    “Have you seen or consulted any other health care providers outside of [facility] since your last visit?”    YES - When: approximately 1 months ago.  Where and Why: [facility] at [place]; Nov. 25, Dr. Cochran - Cardiologist .            Click Here for Release of Records Request             1/16/2024     8:28 AM   PHQ-9    Little interest or pleasure in doing things 0   Feeling down, depressed, or hopeless 0   PHQ-2 Score 0   PHQ-9 Total Score 0           Financial Resource Strain: Low Risk  (7/15/2024)    Overall Financial Resource Strain (CARDIA)     Difficulty of Paying Living Expenses: Not hard at all      Food Insecurity: No Food Insecurity (7/15/2024)    Hunger Vital Sign     Worried About Running Out of Food in the Last Year: Never true     Ran Out of Food in the Last Year: Never true          Health Maintenance Due   Topic Date Due    Pneumococcal 65+ years Vaccine (1 of 2 - PCV) Never done    Shingles vaccine (1 of 2) Never done    Respiratory Syncytial Virus (RSV) Pregnant or age 60 yrs+ (1 - 1-dose 75+ series) Never done    Diabetic Alb to Cr ratio (uACR) test  05/26/2022    Annual Wellness Visit (Medicare Advantage)  01/01/2024    Flu vaccine (1) Never done    COVID-19 Vaccine (1 - 2023-24 season) Never done    Lipids  01/16/2025    Depression Screen  01/16/2025        
kg/m².                  No Known Allergies  Prior to Visit Medications    Medication Sig Taking? Authorizing Provider   blood glucose test strips (CONTOUR NEXT TEST) strip Use to test once daily.  DX: E11.21 Yes Elvin Riggins MD   amLODIPine (NORVASC) 10 MG tablet Take 1 tablet by mouth nightly Yes Farhana Matthews DO   aspirin 81 MG EC tablet 1 pill Mon, Wed, Fri Yes Farhana Matthews DO   atorvastatin (LIPITOR) 40 MG tablet Take 1 tablet by mouth daily Yes Farhana Matthews DO   losartan (COZAAR) 100 MG tablet Take 1 tablet by mouth daily Yes Farhana Matthews DO   metFORMIN (GLUCOPHAGE) 500 MG tablet Take 1 tablet by mouth 2 times daily (with meals) Yes Farhana Matthews DO   metoprolol succinate (TOPROL XL) 25 MG extended release tablet Take 1 tablet by mouth daily Yes Farhana Matthews DO   SITagliptin (JANUVIA) 100 MG tablet Take 1 tablet by mouth daily Yes Farhana Matthews DO   tadalafil (CIALIS) 5 MG tablet Take one tab as needed prior to anticipated sxual activity. Do not take more than one tab daily Yes Farhana Matthews DO   hydroCHLOROthiazide (HYDRODIURIL) 25 MG tablet Take 1 tablet by mouth every morning  Patient taking differently: Take 0.5 tablets by mouth every morning Yes Farhana Matthews DO   Multiple Vitamins-Minerals (PRESERVISION AREDS 2 PO) Take by mouth Yes Provider, MD Mary   Lancets MISC Use to check blood sugar once daily Yes Automatic Reconciliation, Ar       CareTeam (Including outside providers/suppliers regularly involved in providing care):   Patient Care Team:  Alee Mistry MD as PCP - General (Family Medicine)  Alee Mistry MD as PCP - Empaneled Provider  Alee Mistry MD as Consulting Physician (Family Medicine)  Alee Mistry MD as Consulting Physician (Family Medicine)  Mark Anthony Ann MD (Urology)      Reviewed and updated this visit:  Tobacco  Allergies  Meds  Problems  Med Hx  Surg Hx  Soc Hx  Fam Hx

## 2024-12-17 NOTE — ASSESSMENT & PLAN NOTE
Chronic, at goal (stable), continue current treatment plan    Orders:    Comprehensive Metabolic Panel; Future    Hemoglobin A1C; Future    Microalbumin / Creatinine Urine Ratio; Future    Microalbumin / Creatinine Urine Ratio    Hemoglobin A1C    Comprehensive Metabolic Panel

## 2024-12-17 NOTE — ASSESSMENT & PLAN NOTE
Chronic, not at goal (unstable), patient has been taking HCTZ 12.5 mg daily so advised to increase to 25 mg daily and follow-up in 2 weeks.  Advised patient to continue monitor BP at home and bring log to next visit.    Orders:    Comprehensive Metabolic Panel; Future    Comprehensive Metabolic Panel

## 2024-12-19 ENCOUNTER — TELEPHONE (OUTPATIENT)
Age: 84
End: 2024-12-19

## 2024-12-19 NOTE — TELEPHONE ENCOUNTER
Tried to contact pt no answer VM full. If pt calls back per Dr. Riggins to schedule pt a 2 week follow up (around 12/31) for HTN.-TM 12/19/24.

## 2024-12-19 NOTE — TELEPHONE ENCOUNTER
----- Message from Dr. Elvin Riggins MD sent at 12/18/2024  4:10 PM EST -----  Regarding: Follow-up appointment  Hello,    Could you please contact this patient and make a follow-up appointment in 2 weeks for hypertension follow-up.  Thank you!    Elvin Riggins MD

## 2024-12-31 DIAGNOSIS — N52.9 VASCULOGENIC ERECTILE DYSFUNCTION, UNSPECIFIED VASCULOGENIC ERECTILE DYSFUNCTION TYPE: ICD-10-CM

## 2024-12-31 RX ORDER — TADALAFIL 5 MG/1
TABLET ORAL
Qty: 30 TABLET | Refills: 2 | Status: SHIPPED | OUTPATIENT
Start: 2024-12-31

## 2024-12-31 NOTE — TELEPHONE ENCOUNTER
Last appointment: 12/17/24 [facility]  Next appointment: 6/17/25 [facility]  Previous refill encounter(s): 7/15/24 30 + 2    Requested Prescriptions     Pending Prescriptions Disp Refills    tadalafil (CIALIS) 5 MG tablet 30 tablet 2     Sig: Take one tab as needed prior to anticipated sxual activity. Do not take more than one tab daily     For Pharmacy Admin Tracking Only    Program: Medication Refill  CPA in place:    Recommendation Provided To:   Intervention Detail: New Rx: 1, reason: Patient PreferenceIntervention Accepted By:   Gap Closed?:    Time Spent (min):

## 2025-01-07 ENCOUNTER — TELEPHONE (OUTPATIENT)
Age: 85
End: 2025-01-07

## 2025-01-07 NOTE — TELEPHONE ENCOUNTER
Contacted patient, advised patient to come in early for appointment. Patient stated he needs appointment to be cancelled. He feels okay. Appointment cancel.

## 2025-01-09 DIAGNOSIS — E11.21 TYPE 2 DIABETES MELLITUS WITH NEPHROPATHY (HCC): ICD-10-CM

## 2025-01-09 DIAGNOSIS — I10 ESSENTIAL HYPERTENSION: ICD-10-CM

## 2025-01-09 RX ORDER — HYDROCHLOROTHIAZIDE 25 MG/1
25 TABLET ORAL EVERY MORNING
Qty: 90 TABLET | Refills: 1 | Status: SHIPPED | OUTPATIENT
Start: 2025-01-09

## 2025-01-09 RX ORDER — AMLODIPINE BESYLATE 10 MG/1
10 TABLET ORAL NIGHTLY
Qty: 90 TABLET | Refills: 1 | Status: SHIPPED | OUTPATIENT
Start: 2025-01-09

## 2025-01-09 NOTE — TELEPHONE ENCOUNTER
MD Riggins,    Patient had noted only taking 1/2 tablet (12.5mg) of HCTZ but [facility] advised to take 1 tablet (25mg) at OV 12/17/24.   Juhi Loredo    Last appointment: 12/17/24 [facility]  Next appointment: 6/17/25 [facility]  Previous refill encounter(s): 7/15/24 (all 4)  Hctz 90 + 1  Januvia 90 + 1  Metformin 180 + 1  Amlodipine 90 + 1    Requested Prescriptions     Pending Prescriptions Disp Refills    hydroCHLOROthiazide (HYDRODIURIL) 25 MG tablet 90 tablet 1     Sig: Take 1 tablet by mouth every morning    SITagliptin (JANUVIA) 100 MG tablet 90 tablet 1     Sig: Take 1 tablet by mouth daily    metFORMIN (GLUCOPHAGE) 500 MG tablet 180 tablet 1     Sig: Take 1 tablet by mouth 2 times daily (with meals)    amLODIPine (NORVASC) 10 MG tablet 90 tablet 1     Sig: Take 1 tablet by mouth nightly     For Pharmacy Admin Tracking Only    Program: Medication Refill  CPA in place:    Recommendation Provided To:   Intervention Detail: New Rx: 4, reason: Patient Preference  Intervention Accepted By:   Gap Closed?:    Time Spent (min): 5

## 2025-01-15 DIAGNOSIS — I25.10 CORONARY ARTERY DISEASE INVOLVING NATIVE HEART WITHOUT ANGINA PECTORIS, UNSPECIFIED VESSEL OR LESION TYPE: ICD-10-CM

## 2025-01-15 DIAGNOSIS — E11.21 TYPE 2 DIABETES MELLITUS WITH NEPHROPATHY (HCC): ICD-10-CM

## 2025-01-15 DIAGNOSIS — I10 ESSENTIAL HYPERTENSION: ICD-10-CM

## 2025-01-15 RX ORDER — METOPROLOL SUCCINATE 25 MG/1
25 TABLET, EXTENDED RELEASE ORAL DAILY
Qty: 90 TABLET | Refills: 1 | Status: SHIPPED | OUTPATIENT
Start: 2025-01-15

## 2025-01-15 RX ORDER — LOSARTAN POTASSIUM 100 MG/1
100 TABLET ORAL DAILY
Qty: 90 TABLET | Refills: 1 | Status: SHIPPED | OUTPATIENT
Start: 2025-01-15

## 2025-01-15 RX ORDER — ATORVASTATIN CALCIUM 40 MG/1
40 TABLET, FILM COATED ORAL DAILY
Qty: 90 TABLET | Refills: 1 | Status: SHIPPED | OUTPATIENT
Start: 2025-01-15

## 2025-01-15 NOTE — TELEPHONE ENCOUNTER
Last appointment: 12/17/24 [facility], lipid/labs 12/2024  Next appointment: 6/17/25 [facility]  Previous refill encounter(s): 7/15/24 90 + 1 (all 3)    Requested Prescriptions     Pending Prescriptions Disp Refills    losartan (COZAAR) 100 MG tablet 90 tablet 1     Sig: Take 1 tablet by mouth daily    metoprolol succinate (TOPROL XL) 25 MG extended release tablet 90 tablet 1     Sig: Take 1 tablet by mouth daily    atorvastatin (LIPITOR) 40 MG tablet 90 tablet 1     Sig: Take 1 tablet by mouth daily     For Pharmacy Admin Tracking Only    Program: Medication Refill  CPA in place:    Recommendation Provided To:   Intervention Detail: New Rx: 3, reason: Patient Preference  Intervention Accepted By:   Gap Closed?:    Time Spent (min): 5

## 2025-01-16 ENCOUNTER — TELEPHONE (OUTPATIENT)
Age: 85
End: 2025-01-16

## 2025-01-16 NOTE — TELEPHONE ENCOUNTER
----- Message from Dr. Elvin Riggins MD sent at 1/13/2025 11:50 AM EST -----  Regarding: Follow-up appointment  Hello,    Could you please contact this patient and make a follow-up appointment soon for HTN follow-up.  Thank you!    Elvin Riggins MD

## 2025-01-16 NOTE — TELEPHONE ENCOUNTER
Tried to contact pt no answer and VM not set up. If pt happens to call back per Dr. Riggins pt needs a follow up for HTN in the next two weeks or so.-TM 1/16/25

## 2025-02-12 NOTE — TELEPHONE ENCOUNTER
Attempted to contact patient. Left voicemail requesting call back to discuss lab results per providers note and schedule with new provider.    Stephani Armstrong, CMA   DISPLAY PLAN FREE TEXT

## 2025-06-17 ENCOUNTER — OFFICE VISIT (OUTPATIENT)
Age: 85
End: 2025-06-17
Payer: MEDICARE

## 2025-06-17 VITALS
BODY MASS INDEX: 31.65 KG/M2 | HEART RATE: 49 BPM | DIASTOLIC BLOOD PRESSURE: 63 MMHG | RESPIRATION RATE: 16 BRPM | SYSTOLIC BLOOD PRESSURE: 124 MMHG | TEMPERATURE: 97.5 F | OXYGEN SATURATION: 93 % | WEIGHT: 185.4 LBS | HEIGHT: 64 IN

## 2025-06-17 DIAGNOSIS — Z00.00 MEDICARE ANNUAL WELLNESS VISIT, SUBSEQUENT: ICD-10-CM

## 2025-06-17 DIAGNOSIS — R80.9 MICROALBUMINURIA: ICD-10-CM

## 2025-06-17 DIAGNOSIS — E66.811 OBESITY (BMI 30.0-34.9): ICD-10-CM

## 2025-06-17 DIAGNOSIS — E78.2 MIXED HYPERLIPIDEMIA: ICD-10-CM

## 2025-06-17 DIAGNOSIS — N18.31 STAGE 3A CHRONIC KIDNEY DISEASE (HCC): ICD-10-CM

## 2025-06-17 DIAGNOSIS — I10 ESSENTIAL HYPERTENSION: ICD-10-CM

## 2025-06-17 DIAGNOSIS — C67.9 MALIGNANT NEOPLASM OF URINARY BLADDER, UNSPECIFIED SITE (HCC): ICD-10-CM

## 2025-06-17 DIAGNOSIS — I25.10 CORONARY ARTERY DISEASE INVOLVING NATIVE HEART WITHOUT ANGINA PECTORIS, UNSPECIFIED VESSEL OR LESION TYPE: ICD-10-CM

## 2025-06-17 DIAGNOSIS — E11.21 TYPE 2 DIABETES MELLITUS WITH NEPHROPATHY (HCC): Primary | ICD-10-CM

## 2025-06-17 DIAGNOSIS — R00.1 BRADYCARDIA: ICD-10-CM

## 2025-06-17 LAB
ALBUMIN SERPL-MCNC: 3.7 G/DL (ref 3.5–5)
ALBUMIN/GLOB SERPL: 1.3 (ref 1.1–2.2)
ALP SERPL-CCNC: 91 U/L (ref 45–117)
ALT SERPL-CCNC: 31 U/L (ref 12–78)
ANION GAP SERPL CALC-SCNC: 6 MMOL/L (ref 2–12)
AST SERPL-CCNC: 17 U/L (ref 15–37)
BILIRUB SERPL-MCNC: 0.6 MG/DL (ref 0.2–1)
BUN SERPL-MCNC: 29 MG/DL (ref 6–20)
BUN/CREAT SERPL: 19 (ref 12–20)
CALCIUM SERPL-MCNC: 9.6 MG/DL (ref 8.5–10.1)
CHLORIDE SERPL-SCNC: 107 MMOL/L (ref 97–108)
CHOLEST SERPL-MCNC: 150 MG/DL
CO2 SERPL-SCNC: 24 MMOL/L (ref 21–32)
CREAT SERPL-MCNC: 1.55 MG/DL (ref 0.7–1.3)
EST. AVERAGE GLUCOSE BLD GHB EST-MCNC: 137 MG/DL
GLOBULIN SER CALC-MCNC: 2.9 G/DL (ref 2–4)
GLUCOSE SERPL-MCNC: 141 MG/DL (ref 65–100)
HBA1C MFR BLD: 6.4 % (ref 4–5.6)
HDLC SERPL-MCNC: 56 MG/DL
HDLC SERPL: 2.7 (ref 0–5)
LDLC SERPL CALC-MCNC: 66.8 MG/DL (ref 0–100)
POTASSIUM SERPL-SCNC: 4.2 MMOL/L (ref 3.5–5.1)
PROT SERPL-MCNC: 6.6 G/DL (ref 6.4–8.2)
SODIUM SERPL-SCNC: 137 MMOL/L (ref 136–145)
TRIGL SERPL-MCNC: 136 MG/DL
VLDLC SERPL CALC-MCNC: 27.2 MG/DL

## 2025-06-17 PROCEDURE — 1160F RVW MEDS BY RX/DR IN RCRD: CPT | Performed by: STUDENT IN AN ORGANIZED HEALTH CARE EDUCATION/TRAINING PROGRAM

## 2025-06-17 PROCEDURE — 99214 OFFICE O/P EST MOD 30 MIN: CPT | Performed by: STUDENT IN AN ORGANIZED HEALTH CARE EDUCATION/TRAINING PROGRAM

## 2025-06-17 PROCEDURE — 1126F AMNT PAIN NOTED NONE PRSNT: CPT | Performed by: STUDENT IN AN ORGANIZED HEALTH CARE EDUCATION/TRAINING PROGRAM

## 2025-06-17 PROCEDURE — 3074F SYST BP LT 130 MM HG: CPT | Performed by: STUDENT IN AN ORGANIZED HEALTH CARE EDUCATION/TRAINING PROGRAM

## 2025-06-17 PROCEDURE — 1159F MED LIST DOCD IN RCRD: CPT | Performed by: STUDENT IN AN ORGANIZED HEALTH CARE EDUCATION/TRAINING PROGRAM

## 2025-06-17 PROCEDURE — 1123F ACP DISCUSS/DSCN MKR DOCD: CPT | Performed by: STUDENT IN AN ORGANIZED HEALTH CARE EDUCATION/TRAINING PROGRAM

## 2025-06-17 PROCEDURE — 3078F DIAST BP <80 MM HG: CPT | Performed by: STUDENT IN AN ORGANIZED HEALTH CARE EDUCATION/TRAINING PROGRAM

## 2025-06-17 PROCEDURE — G0439 PPPS, SUBSEQ VISIT: HCPCS | Performed by: STUDENT IN AN ORGANIZED HEALTH CARE EDUCATION/TRAINING PROGRAM

## 2025-06-17 RX ORDER — METOPROLOL SUCCINATE 25 MG/1
12.5 TABLET, EXTENDED RELEASE ORAL DAILY
Qty: 90 TABLET | Refills: 1 | Status: SHIPPED | OUTPATIENT
Start: 2025-06-17

## 2025-06-17 SDOH — ECONOMIC STABILITY: FOOD INSECURITY: WITHIN THE PAST 12 MONTHS, THE FOOD YOU BOUGHT JUST DIDN'T LAST AND YOU DIDN'T HAVE MONEY TO GET MORE.: NEVER TRUE

## 2025-06-17 SDOH — ECONOMIC STABILITY: FOOD INSECURITY: WITHIN THE PAST 12 MONTHS, YOU WORRIED THAT YOUR FOOD WOULD RUN OUT BEFORE YOU GOT MONEY TO BUY MORE.: NEVER TRUE

## 2025-06-17 ASSESSMENT — PATIENT HEALTH QUESTIONNAIRE - PHQ9
SUM OF ALL RESPONSES TO PHQ QUESTIONS 1-9: 0
SUM OF ALL RESPONSES TO PHQ QUESTIONS 1-9: 0
1. LITTLE INTEREST OR PLEASURE IN DOING THINGS: NOT AT ALL
2. FEELING DOWN, DEPRESSED OR HOPELESS: NOT AT ALL
SUM OF ALL RESPONSES TO PHQ QUESTIONS 1-9: 0
SUM OF ALL RESPONSES TO PHQ QUESTIONS 1-9: 0

## 2025-06-17 NOTE — PROGRESS NOTES
Medicare Annual Wellness Visit    Rush Rayo is here for 6 Month Follow-Up (Essential hypertension/Type 2 diabetes mellitus with nephropathy) and Medicare AWV    Type 2 diabetes mellitus with nephropathy (HCC)  -     Hemoglobin A1C; Future  -     Comprehensive Metabolic Panel; Future  Essential hypertension  -     metoprolol succinate (TOPROL XL) 25 MG extended release tablet; Take 0.5 tablets by mouth daily, Disp-90 tablet, R-1Normal  Coronary artery disease involving native heart without angina pectoris, unspecified vessel or lesion type  -     metoprolol succinate (TOPROL XL) 25 MG extended release tablet; Take 0.5 tablets by mouth daily, Disp-90 tablet, R-1Normal  Stage 3a chronic kidney disease (HCC)  Obesity (BMI 30.0-34.9)  Microalbuminuria  Mixed hyperlipidemia  -     Lipid Panel; Future  Malignant neoplasm of urinary bladder, unspecified site ([facility])    Results  Labs   - A1c: 6.3     No follow-ups on file.     Subjective   The following acute and/or chronic problems were also addressed today:  Assessment & Plan  1. Hypertension: Blood pressure well-controlled however heart rate is low at 49.  - Blood pressure readings are within the normal range today.  - Dosage of metoprolol will be reduced from 25 mg to 12.5 mg; advised to use a pill cutter.  - Prescription refill for metoprolol sent to pharmacy; instructed to monitor blood pressure and report any significant changes.    2. Coronary Artery Disease: Stable.  - History of coronary artery disease with a stent in place. Low heart rate noted; current heart rate is 49.  - Dosage of metoprolol will be reduced from 25 mg to 12.5 mg due to low heart rate.  - Continue taking other prescribed medications.    3. Type 2 Diabetes Mellitus: Stable.  - A1c level recorded as 6.3 during the last visit, indicating good control.  - No changes to current medication regimen necessary.  - A1c test will be conducted today.  - Blood work will be conducted to assess kidney

## 2025-06-17 NOTE — PROGRESS NOTES
Chief Complaint   Patient presents with    6 Month Follow-Up     Essential hypertension  Type 2 diabetes mellitus with nephropathy     \"Have you been to the ER, urgent care clinic since your last visit?  Hospitalized since your last visit?\"    NO    “Have you seen or consulted any other health care providers outside of [facility] since your last visit?”    NO            Click Here for Release of Records Request             12/17/2024     9:03 AM   PHQ-9    Little interest or pleasure in doing things 0   Feeling down, depressed, or hopeless 0   PHQ-2 Score 0   PHQ-9 Total Score 0           Financial Resource Strain: Low Risk  (7/15/2024)    Overall Financial Resource Strain (CARDIA)     Difficulty of Paying Living Expenses: Not hard at all      Food Insecurity: No Food Insecurity (7/15/2024)    Hunger Vital Sign     Worried About Running Out of Food in the Last Year: Never true     Ran Out of Food in the Last Year: Never true          Health Maintenance Due   Topic Date Due    Pneumococcal 50+ years Vaccine (1 of 2 - PCV) Never done    Shingles vaccine (1 of 2) Never done    Respiratory Syncytial Virus (RSV) Pregnant or age 60 yrs+ (1 - 1-dose 75+ series) Never done    COVID-19 Vaccine (1 - 2024-25 season) Never done    Annual Wellness Visit (Medicare Advantage)  01/01/2025

## 2025-06-17 NOTE — PATIENT INSTRUCTIONS

## 2025-06-19 ENCOUNTER — RESULTS FOLLOW-UP (OUTPATIENT)
Age: 85
End: 2025-06-19

## 2025-06-26 DIAGNOSIS — E11.21 TYPE 2 DIABETES MELLITUS WITH NEPHROPATHY (HCC): ICD-10-CM

## 2025-06-26 NOTE — TELEPHONE ENCOUNTER
Last appointment: 06/17/2025 MD Riggins   Next appointment: 12/17/2025 MD Riggins   Previous refill encounter(s):   01/09/2025 Glucophage #180 with 1 refill.     For Pharmacy Admin Tracking Only    Program: Medication Refill  Intervention Detail: New Rx: 1, reason: Patient Preference  Time Spent (min): 5    Requested Prescriptions     Pending Prescriptions Disp Refills    metFORMIN (GLUCOPHAGE) 500 MG tablet 180 tablet 1     Sig: Take 1 tablet by mouth 2 times daily (with meals)

## 2025-07-14 DIAGNOSIS — I10 ESSENTIAL HYPERTENSION: ICD-10-CM

## 2025-07-14 RX ORDER — LOSARTAN POTASSIUM 100 MG/1
100 TABLET ORAL DAILY
Qty: 90 TABLET | Refills: 1 | Status: SHIPPED | OUTPATIENT
Start: 2025-07-14

## 2025-07-14 NOTE — TELEPHONE ENCOUNTER
Last appointment: 6/17/25 [facility]  Next appointment: 12/17/25 [facility]  Previous refill encounter(s): 1/15/25 90 + 1    Requested Prescriptions     Pending Prescriptions Disp Refills    losartan (COZAAR) 100 MG tablet 90 tablet 1     Sig: Take 1 tablet by mouth daily     For Pharmacy Admin Tracking Only    Program: Medication Refill  CPA in place:    Recommendation Provided To:   Intervention Detail: New Rx: 1, reason: Patient Preference  Intervention Accepted By:   Gap Closed?:    Time Spent (min): 5

## 2025-07-16 DIAGNOSIS — E11.21 TYPE 2 DIABETES MELLITUS WITH NEPHROPATHY (HCC): ICD-10-CM

## 2025-07-16 DIAGNOSIS — I10 ESSENTIAL HYPERTENSION: ICD-10-CM

## 2025-07-16 RX ORDER — AMLODIPINE BESYLATE 10 MG/1
10 TABLET ORAL NIGHTLY
Qty: 90 TABLET | Refills: 1 | Status: SHIPPED | OUTPATIENT
Start: 2025-07-16

## 2025-07-16 RX ORDER — HYDROCHLOROTHIAZIDE 25 MG/1
25 TABLET ORAL EVERY MORNING
Qty: 90 TABLET | Refills: 1 | Status: SHIPPED | OUTPATIENT
Start: 2025-07-16

## 2025-07-16 NOTE — TELEPHONE ENCOUNTER
Last appointment: 6/17/25 [facility]  Next appointment: 12/17/25 [facility]  Previous refill encounter(s): 1/9/25 90 + 1 (all 3)    Requested Prescriptions     Pending Prescriptions Disp Refills    amLODIPine (NORVASC) 10 MG tablet 90 tablet 1     Sig: Take 1 tablet by mouth nightly    hydroCHLOROthiazide (HYDRODIURIL) 25 MG tablet 90 tablet 1     Sig: Take 1 tablet by mouth every morning    SITagliptin (JANUVIA) 100 MG tablet 90 tablet 1     Sig: Take 1 tablet by mouth daily     For Pharmacy Admin Tracking Only    Program: Medication Refill  CPA in place:    Recommendation Provided To:   Intervention Detail: New Rx: 3, reason: Patient Preference  Intervention Accepted By:   Gap Closed?:    Time Spent (min): 5

## 2025-07-18 DIAGNOSIS — I25.10 CORONARY ARTERY DISEASE INVOLVING NATIVE HEART WITHOUT ANGINA PECTORIS, UNSPECIFIED VESSEL OR LESION TYPE: ICD-10-CM

## 2025-07-18 DIAGNOSIS — E11.21 TYPE 2 DIABETES MELLITUS WITH NEPHROPATHY (HCC): ICD-10-CM

## 2025-07-18 RX ORDER — ATORVASTATIN CALCIUM 40 MG/1
40 TABLET, FILM COATED ORAL DAILY
Qty: 90 TABLET | Refills: 1 | Status: SHIPPED | OUTPATIENT
Start: 2025-07-18

## 2025-07-18 NOTE — TELEPHONE ENCOUNTER
Call from Irene for atorvastatin refill.  Gertrude, Juhi    Last appointment: 6/17/25 [facility], lipid 6/2025  Next appointment: 12/17/25 [facility]  Previous refill encounter(s): 1/15/25 90 + 1    Requested Prescriptions     Pending Prescriptions Disp Refills    atorvastatin (LIPITOR) 40 MG tablet 90 tablet 1     Sig: Take 1 tablet by mouth daily     For Pharmacy Admin Tracking Only    Program: Medication Refill  CPA in place:    Recommendation Provided To:   Intervention Detail: New Rx: 1, reason: Patient Preference  Intervention Accepted By:   Gap Closed?:    Time Spent (min): 5

## 2025-08-12 DIAGNOSIS — N52.9 VASCULOGENIC ERECTILE DYSFUNCTION, UNSPECIFIED VASCULOGENIC ERECTILE DYSFUNCTION TYPE: ICD-10-CM

## 2025-08-12 RX ORDER — TADALAFIL 5 MG/1
TABLET ORAL
Qty: 30 TABLET | Refills: 2 | Status: SHIPPED | OUTPATIENT
Start: 2025-08-12